# Patient Record
Sex: FEMALE | Race: WHITE | NOT HISPANIC OR LATINO | Employment: OTHER | URBAN - METROPOLITAN AREA
[De-identification: names, ages, dates, MRNs, and addresses within clinical notes are randomized per-mention and may not be internally consistent; named-entity substitution may affect disease eponyms.]

---

## 2021-05-26 ENCOUNTER — RECORDS - HEALTHEAST (OUTPATIENT)
Dept: ADMINISTRATIVE | Facility: CLINIC | Age: 81
End: 2021-05-26

## 2021-05-28 ENCOUNTER — RECORDS - HEALTHEAST (OUTPATIENT)
Dept: ADMINISTRATIVE | Facility: CLINIC | Age: 81
End: 2021-05-28

## 2021-06-16 PROBLEM — E11.9 DMII (DIABETES MELLITUS, TYPE 2) (H): Status: ACTIVE | Noted: 2019-09-21

## 2021-06-16 PROBLEM — E87.5 HYPERKALEMIA: Status: ACTIVE | Noted: 2019-09-20

## 2021-06-16 PROBLEM — I48.91 RAPID ATRIAL FIBRILLATION (H): Status: ACTIVE | Noted: 2019-09-21

## 2021-06-16 PROBLEM — N17.9 ACUTE RENAL FAILURE (H): Status: ACTIVE | Noted: 2019-09-21

## 2021-06-16 PROBLEM — E78.5 HYPERLIPIDEMIA: Status: ACTIVE | Noted: 2019-09-21

## 2021-06-16 PROBLEM — N17.0 ATN (ACUTE TUBULAR NECROSIS) (H): Status: ACTIVE | Noted: 2019-09-21

## 2021-06-16 PROBLEM — I10 HTN (HYPERTENSION): Status: ACTIVE | Noted: 2019-09-21

## 2021-06-27 ENCOUNTER — HEALTH MAINTENANCE LETTER (OUTPATIENT)
Age: 81
End: 2021-06-27

## 2021-07-21 ENCOUNTER — RECORDS - HEALTHEAST (OUTPATIENT)
Dept: ADMINISTRATIVE | Facility: CLINIC | Age: 81
End: 2021-07-21

## 2021-10-17 ENCOUNTER — HEALTH MAINTENANCE LETTER (OUTPATIENT)
Age: 81
End: 2021-10-17

## 2022-02-05 ENCOUNTER — HEALTH MAINTENANCE LETTER (OUTPATIENT)
Age: 82
End: 2022-02-05

## 2022-04-18 ENCOUNTER — HOSPITAL ENCOUNTER (EMERGENCY)
Facility: CLINIC | Age: 82
Discharge: HOME | End: 2022-04-18
Admitting: PHYSICIAN ASSISTANT
Payer: COMMERCIAL

## 2022-04-18 ENCOUNTER — APPOINTMENT (OUTPATIENT)
Dept: RADIOLOGY | Facility: CLINIC | Age: 82
End: 2022-04-18
Payer: COMMERCIAL

## 2022-04-18 VITALS
TEMPERATURE: 98.4 F | OXYGEN SATURATION: 94 % | HEIGHT: 66 IN | DIASTOLIC BLOOD PRESSURE: 84 MMHG | SYSTOLIC BLOOD PRESSURE: 181 MMHG | RESPIRATION RATE: 26 BRPM | WEIGHT: 160 LBS | HEART RATE: 79 BPM | BODY MASS INDEX: 25.71 KG/M2

## 2022-04-18 DIAGNOSIS — I10 HTN (HYPERTENSION): ICD-10-CM

## 2022-04-18 LAB
ALBUMIN SERPL-MCNC: 4.3 G/DL (ref 3.5–5)
ALP SERPL-CCNC: 67 U/L (ref 45–120)
ALT SERPL W P-5'-P-CCNC: 29 U/L (ref 0–45)
ANION GAP SERPL CALCULATED.3IONS-SCNC: 12 MMOL/L (ref 5–18)
AST SERPL W P-5'-P-CCNC: 27 U/L (ref 0–40)
BASOPHILS # BLD AUTO: 0 10E3/UL (ref 0–0.2)
BASOPHILS NFR BLD AUTO: 1 %
BILIRUB SERPL-MCNC: 0.3 MG/DL (ref 0–1)
BUN SERPL-MCNC: 43 MG/DL (ref 8–28)
CALCIUM SERPL-MCNC: 10.3 MG/DL (ref 8.5–10.5)
CHLORIDE BLD-SCNC: 107 MMOL/L (ref 98–107)
CO2 SERPL-SCNC: 22 MMOL/L (ref 22–31)
CREAT SERPL-MCNC: 1.46 MG/DL (ref 0.6–1.1)
EOSINOPHIL # BLD AUTO: 0.2 10E3/UL (ref 0–0.7)
EOSINOPHIL NFR BLD AUTO: 2 %
ERYTHROCYTE [DISTWIDTH] IN BLOOD BY AUTOMATED COUNT: 12.7 % (ref 10–15)
GFR SERPL CREATININE-BSD FRML MDRD: 36 ML/MIN/1.73M2
GLUCOSE BLD-MCNC: 142 MG/DL (ref 70–125)
HCT VFR BLD AUTO: 39.9 % (ref 35–47)
HGB BLD-MCNC: 13.5 G/DL (ref 11.7–15.7)
IMM GRANULOCYTES # BLD: 0 10E3/UL
IMM GRANULOCYTES NFR BLD: 1 %
INR PPP: 3.22 (ref 0.85–1.15)
LYMPHOCYTES # BLD AUTO: 1.7 10E3/UL (ref 0.8–5.3)
LYMPHOCYTES NFR BLD AUTO: 19 %
MCH RBC QN AUTO: 31.8 PG (ref 26.5–33)
MCHC RBC AUTO-ENTMCNC: 33.8 G/DL (ref 31.5–36.5)
MCV RBC AUTO: 94 FL (ref 78–100)
MONOCYTES # BLD AUTO: 0.9 10E3/UL (ref 0–1.3)
MONOCYTES NFR BLD AUTO: 10 %
NEUTROPHILS # BLD AUTO: 6.1 10E3/UL (ref 1.6–8.3)
NEUTROPHILS NFR BLD AUTO: 67 %
NRBC # BLD AUTO: 0 10E3/UL
NRBC BLD AUTO-RTO: 0 /100
PLATELET # BLD AUTO: 204 10E3/UL (ref 150–450)
POTASSIUM BLD-SCNC: 4.7 MMOL/L (ref 3.5–5)
PROT SERPL-MCNC: 7.2 G/DL (ref 6–8)
RBC # BLD AUTO: 4.24 10E6/UL (ref 3.8–5.2)
SODIUM SERPL-SCNC: 141 MMOL/L (ref 136–145)
TROPONIN I SERPL-MCNC: <0.01 NG/ML (ref 0–0.29)
TSH SERPL DL<=0.005 MIU/L-ACNC: 2.22 UIU/ML (ref 0.3–5)
WBC # BLD AUTO: 8.8 10E3/UL (ref 4–11)

## 2022-04-18 PROCEDURE — 36415 COLL VENOUS BLD VENIPUNCTURE: CPT | Performed by: PHYSICIAN ASSISTANT

## 2022-04-18 PROCEDURE — 84484 ASSAY OF TROPONIN QUANT: CPT | Performed by: PHYSICIAN ASSISTANT

## 2022-04-18 PROCEDURE — 80053 COMPREHEN METABOLIC PANEL: CPT | Performed by: PHYSICIAN ASSISTANT

## 2022-04-18 PROCEDURE — 99285 EMERGENCY DEPT VISIT HI MDM: CPT | Mod: 25

## 2022-04-18 PROCEDURE — 71046 X-RAY EXAM CHEST 2 VIEWS: CPT

## 2022-04-18 PROCEDURE — 85610 PROTHROMBIN TIME: CPT | Performed by: PHYSICIAN ASSISTANT

## 2022-04-18 PROCEDURE — 84443 ASSAY THYROID STIM HORMONE: CPT | Performed by: PHYSICIAN ASSISTANT

## 2022-04-18 PROCEDURE — 93005 ELECTROCARDIOGRAM TRACING: CPT | Performed by: PHYSICIAN ASSISTANT

## 2022-04-18 PROCEDURE — 85025 COMPLETE CBC W/AUTO DIFF WBC: CPT | Performed by: PHYSICIAN ASSISTANT

## 2022-04-18 ASSESSMENT — ENCOUNTER SYMPTOMS
MUSCULOSKELETAL NEGATIVE: 1
FEVER: 0
CHEST TIGHTNESS: 0
VOMITING: 0
PHOTOPHOBIA: 0
NEUROLOGICAL NEGATIVE: 1
ENDOCRINE NEGATIVE: 1
NAUSEA: 1
CHILLS: 0
HEMATOLOGIC/LYMPHATIC NEGATIVE: 1
EYE PAIN: 0
SHORTNESS OF BREATH: 0
ABDOMINAL PAIN: 0
FATIGUE: 1
DIARRHEA: 0

## 2022-04-18 NOTE — ED TRIAGE NOTES
Patient presents to the ED with complaints of hypertension for the last week and an episode of blurred vision that has resolved.

## 2022-04-18 NOTE — DISCHARGE INSTRUCTIONS
Please continue to take your blood pressure medications as written.  Return to the ER if you have new or worsening symptoms.  Otherwise follow-up with your primary care for medication adjustments with regards to your long-term hypertension

## 2022-04-18 NOTE — ED PROVIDER NOTES
EMERGENCY DEPARTMENT ENCOUNTER      NAME: Francy Sherman  AGE: 82 year old female  YOB: 1940  MRN: [id number]  EVALUATION DATE & TIME: 4/18/2022  3:12 PM    PCP: Samy Suazo    ED PROVIDER: Manuel Louise PA-C      Chief Complaint   Patient presents with     Hypertension     Eye Problem         FINAL IMPRESSION:  1. HTN (hypertension)          MEDICAL DECISION MAKING:    Pertinent Labs & Imaging studies reviewed. (See chart for details)  82 year old female presents to the Emergency Department for evaluation of episodes of lightheadedness, fatigue, intermittent blurred vision that have been present over the last 48 to 72 hours.  Patient called her clinic today to report the symptoms as well as elevated blood pressure readings and she was referred here for a evaluation.    Overall the patient currently is only reporting fatigue.  Specifically there is no reports of blurred vision, headache, chest pain, shortness of breath, or abdominal pain.  She denies running out of any of her medications.    At this point time plan to screen with labs, EKG, chest x-ray and observe for change in clinical condition.    Work-up is essentially unremarkable here in the ED.  Discussing all of the results with the patient she reports that she currently has no symptoms whatsoever.  Blood pressure readings have jumped around from 150s to the 180s.  I do not feel that further emergent work-up is necessary.  I recommended that the patient keep the blood pressure diary over the next week or so so that she can follow-up through the primary care so they can make some long-term dosing adjustments as indicated based on chronic numbers.  I instructed the patient to return to the ED if she has new or worsening symptoms.  Overall she is agreeable with plan of care.      ED COURSE    I met with the patient, obtained history, performed an initial exam, and discussed options and plan for diagnostics and treatment here in the  ED.    At the conclusion of the encounter I discussed the results of all of the tests and the disposition. The questions were answered. The patient or family acknowledged understanding and was agreeable with the care plan.     MEDICATIONS GIVEN IN THE EMERGENCY:  Medications - No data to display    NEW PRESCRIPTIONS STARTED AT TODAY'S ER VISIT  New Prescriptions    No medications on file            =================================================================    HPI    Patient information was obtained from:   Francy Sherman is a 82 year old female with a pertinent history of hypertension, hyperlipidemia, type 2 diabetes, paroxysmal atrial fibrillation who presents to this ED for evaluation of 48 to 72 hours of reports of lightheadedness, fatigue, nausea, blurred vision.  She also reports noticing that her blood pressures have been elevated over the last 3 to 4 days as well.  There is no documented report of change in any of her long-term medications.  It appears that she is on metoprolol and amlodipine for blood pressure management.  Patient also uses warfarin.  She denies any recent head injury or trauma.  Currently she denies any pain, specifically there is no headache, chest pain, back pain, abdominal pain.  Currently her vision is back to normal.  Her only current symptom is reports of fatigue.  No other complaints at this time.      REVIEW OF SYSTEMS   Review of Systems   Constitutional: Positive for fatigue. Negative for chills and fever.   HENT: Negative.    Eyes: Positive for visual disturbance. Negative for photophobia and pain.   Respiratory: Negative for chest tightness and shortness of breath.    Cardiovascular: Negative for chest pain and leg swelling.   Gastrointestinal: Positive for nausea. Negative for abdominal pain, diarrhea and vomiting.   Endocrine: Negative.    Genitourinary: Negative.    Musculoskeletal: Negative.    Neurological: Negative.    Hematological: Negative.           PAST MEDICAL  HISTORY:  No past medical history on file.    PAST SURGICAL HISTORY:  Past Surgical History:   Procedure Laterality Date     CHOLECYSTECTOMY           CURRENT MEDICATIONS:    No current facility-administered medications for this encounter.    Current Outpatient Medications:      amLODIPine (NORVASC) 5 MG tablet, [AMLODIPINE (NORVASC) 5 MG TABLET] Take 5 mg by mouth daily., Disp: , Rfl:      aspirin 81 MG EC tablet, [ASPIRIN 81 MG EC TABLET] Take 81 mg by mouth daily., Disp: , Rfl:      erythromycin ophthalmic ointment, [ERYTHROMYCIN OPHTHALMIC OINTMENT] Administer 1 application to both eyes at bedtime., Disp: , Rfl:      glipiZIDE (GLUCOTROL) 5 MG tablet, [GLIPIZIDE (GLUCOTROL) 5 MG TABLET] Take 0.5 tablets (2.5 mg total) by mouth daily., Disp: , Rfl: 0     LANTUS SOLOSTAR U-100 INSULIN 100 unit/mL (3 mL) pen, [LANTUS SOLOSTAR U-100 INSULIN 100 UNIT/ML (3 ML) PEN] Inject 10 Units under the skin at bedtime. 11.65 Type 2 with hyperglycemia  Contact provider if insulin prescribed is not the preferred insulin per insurance., Disp: 5 pen, Rfl: PRN     metoprolol succinate (TOPROL-XL) 25 MG, [METOPROLOL SUCCINATE (TOPROL-XL) 25 MG] Take 1 tablet (25 mg total) by mouth daily., Disp: 30 tablet, Rfl: 0     pravastatin (PRAVACHOL) 10 MG tablet, [PRAVASTATIN (PRAVACHOL) 10 MG TABLET] Take 1 tablet (10 mg total) by mouth daily with supper., Disp: 30 tablet, Rfl: 0     SITagliptin (JANUVIA) 25 MG tablet, [SITAGLIPTIN (JANUVIA) 25 MG TABLET] Take 1 tablet (25 mg total) by mouth daily., Disp: 30 tablet, Rfl: 0     warfarin (COUMADIN/JANTOVEN) 5 MG tablet, [WARFARIN (COUMADIN/JANTOVEN) 5 MG TABLET] Take 1 tablet (5 mg total) by mouth daily. X 3 days then dose to be adjusted by PCP  based on INR goal of 2-3, Disp: 10 tablet, Rfl: 0      ALLERGIES:  Allergies   Allergen Reactions     Macrobid [Nitrofurantoin] Rash       FAMILY HISTORY:  No family history on file.    SOCIAL HISTORY:   Social History     Socioeconomic History      "Marital status:    Tobacco Use     Smoking status: Former Smoker     Packs/day: 0.00     Smokeless tobacco: Never Used   Substance and Sexual Activity     Alcohol use: Never     Drug use: Never       VITALS:  Patient Vitals for the past 24 hrs:   BP Temp Temp src Pulse Resp SpO2 Height Weight   04/18/22 1645 (!) 181/84 -- -- 79 26 94 % -- --   04/18/22 1615 (!) 160/75 -- -- 78 (!) 38 96 % -- --   04/18/22 1600 (!) 157/74 -- -- 77 26 95 % -- --   04/18/22 1545 (!) 167/72 -- -- 75 30 97 % -- --   04/18/22 1442 (!) 202/100 98.4  F (36.9  C) Oral 83 18 98 % 1.676 m (5' 6\") 72.6 kg (160 lb)       PHYSICAL EXAM    Physical Exam  Vitals and nursing note reviewed.   Constitutional:       General: She is not in acute distress.     Appearance: She is normal weight. She is not toxic-appearing.   HENT:      Head: Normocephalic and atraumatic.      Right Ear: External ear normal.      Left Ear: External ear normal.      Mouth/Throat:      Mouth: Mucous membranes are moist.   Eyes:      Conjunctiva/sclera: Conjunctivae normal.      Pupils: Pupils are equal, round, and reactive to light.   Cardiovascular:      Rate and Rhythm: Normal rate.      Pulses: Normal pulses.   Pulmonary:      Effort: Pulmonary effort is normal. No respiratory distress.      Breath sounds: No wheezing or rales.   Abdominal:      General: Abdomen is flat. Bowel sounds are normal.      Tenderness: There is no guarding or rebound.   Musculoskeletal:         General: No deformity or signs of injury. Normal range of motion.      Cervical back: Normal range of motion.      Right lower leg: No edema.      Left lower leg: No edema.   Skin:     General: Skin is warm and dry.      Findings: No rash.   Neurological:      General: No focal deficit present.      Mental Status: She is alert. Mental status is at baseline.      Sensory: No sensory deficit.      Motor: No weakness.   Psychiatric:         Mood and Affect: Mood normal.          LAB:  All pertinent " labs reviewed and interpreted.  Results for orders placed or performed during the hospital encounter of 04/18/22   Chest XR,  PA & LAT    Impression    IMPRESSION: Mild bandlike atelectasis within the lingula. No evidence for CHF or pneumonia. No pleural effusion or pneumothorax. Normal heart size.   Result Value Ref Range    Troponin I <0.01 0.00 - 0.29 ng/mL   Comprehensive metabolic panel   Result Value Ref Range    Sodium 141 136 - 145 mmol/L    Potassium 4.7 3.5 - 5.0 mmol/L    Chloride 107 98 - 107 mmol/L    Carbon Dioxide (CO2) 22 22 - 31 mmol/L    Anion Gap 12 5 - 18 mmol/L    Urea Nitrogen 43 (H) 8 - 28 mg/dL    Creatinine 1.46 (H) 0.60 - 1.10 mg/dL    Calcium 10.3 8.5 - 10.5 mg/dL    Glucose 142 (H) 70 - 125 mg/dL    Alkaline Phosphatase 67 45 - 120 U/L    AST 27 0 - 40 U/L    ALT 29 0 - 45 U/L    Protein Total 7.2 6.0 - 8.0 g/dL    Albumin 4.3 3.5 - 5.0 g/dL    Bilirubin Total 0.3 0.0 - 1.0 mg/dL    GFR Estimate 36 (L) >60 mL/min/1.73m2   TSH with free T4 reflex   Result Value Ref Range    TSH 2.22 0.30 - 5.00 uIU/mL   Result Value Ref Range    INR 3.22 (H) 0.85 - 1.15   CBC with platelets and differential   Result Value Ref Range    WBC Count 8.8 4.0 - 11.0 10e3/uL    RBC Count 4.24 3.80 - 5.20 10e6/uL    Hemoglobin 13.5 11.7 - 15.7 g/dL    Hematocrit 39.9 35.0 - 47.0 %    MCV 94 78 - 100 fL    MCH 31.8 26.5 - 33.0 pg    MCHC 33.8 31.5 - 36.5 g/dL    RDW 12.7 10.0 - 15.0 %    Platelet Count 204 150 - 450 10e3/uL    % Neutrophils 67 %    % Lymphocytes 19 %    % Monocytes 10 %    % Eosinophils 2 %    % Basophils 1 %    % Immature Granulocytes 1 %    NRBCs per 100 WBC 0 <1 /100    Absolute Neutrophils 6.1 1.6 - 8.3 10e3/uL    Absolute Lymphocytes 1.7 0.8 - 5.3 10e3/uL    Absolute Monocytes 0.9 0.0 - 1.3 10e3/uL    Absolute Eosinophils 0.2 0.0 - 0.7 10e3/uL    Absolute Basophils 0.0 0.0 - 0.2 10e3/uL    Absolute Immature Granulocytes 0.0 <=0.4 10e3/uL    Absolute NRBCs 0.0 10e3/uL       RADIOLOGY:  Reviewed  all pertinent imaging. Please see official radiology report.  Chest XR,  PA & LAT   Final Result   IMPRESSION: Mild bandlike atelectasis within the lingula. No evidence for CHF or pneumonia. No pleural effusion or pneumothorax. Normal heart size.          EKG:    Performed at: 1531    The EKG showing a sinus rhythm at a rate of 73 bpm, persistent evidence of right bundle branch block which is shown on previous EKG.  QTC measured at 45.  ST segments are grossly normal.  T waves do show intermittent inversion but unchanged compared to previous EKG.    I have independently reviewed and interpreted the EKG(s) documented above.          Manuel Louise PA-C  Emergency Medicine  [facility]     Manuel Louise PA-C  04/18/22 9075

## 2022-04-19 LAB
ATRIAL RATE - MUSE: 73 BPM
DIASTOLIC BLOOD PRESSURE - MUSE: 93 MMHG
INTERPRETATION ECG - MUSE: NORMAL
P AXIS - MUSE: 52 DEGREES
PR INTERVAL - MUSE: 154 MS
QRS DURATION - MUSE: 132 MS
QT - MUSE: 386 MS
QTC - MUSE: 425 MS
R AXIS - MUSE: -33 DEGREES
SYSTOLIC BLOOD PRESSURE - MUSE: 155 MMHG
T AXIS - MUSE: -6 DEGREES
VENTRICULAR RATE- MUSE: 73 BPM

## 2022-07-23 ENCOUNTER — HEALTH MAINTENANCE LETTER (OUTPATIENT)
Age: 82
End: 2022-07-23

## 2022-10-01 ENCOUNTER — HEALTH MAINTENANCE LETTER (OUTPATIENT)
Age: 82
End: 2022-10-01

## 2023-05-14 ENCOUNTER — HEALTH MAINTENANCE LETTER (OUTPATIENT)
Age: 83
End: 2023-05-14

## 2023-08-06 ENCOUNTER — HEALTH MAINTENANCE LETTER (OUTPATIENT)
Age: 83
End: 2023-08-06

## 2023-12-24 ENCOUNTER — HEALTH MAINTENANCE LETTER (OUTPATIENT)
Age: 83
End: 2023-12-24

## 2024-06-10 ENCOUNTER — TRANSFERRED RECORDS (OUTPATIENT)
Dept: HEALTH INFORMATION MANAGEMENT | Facility: CLINIC | Age: 84
End: 2024-06-10
Payer: COMMERCIAL

## 2024-06-14 ENCOUNTER — APPOINTMENT (OUTPATIENT)
Dept: RADIOLOGY | Facility: CLINIC | Age: 84
DRG: 862 | End: 2024-06-14
Attending: HOSPITALIST
Payer: COMMERCIAL

## 2024-06-14 ENCOUNTER — APPOINTMENT (OUTPATIENT)
Dept: RADIOLOGY | Facility: CLINIC | Age: 84
DRG: 862 | End: 2024-06-14
Attending: EMERGENCY MEDICINE
Payer: COMMERCIAL

## 2024-06-14 ENCOUNTER — HOSPITAL ENCOUNTER (INPATIENT)
Facility: CLINIC | Age: 84
LOS: 8 days | Discharge: ANOTHER HOSPITAL | DRG: 862 | End: 2024-06-22
Attending: EMERGENCY MEDICINE | Admitting: HOSPITALIST
Payer: COMMERCIAL

## 2024-06-14 DIAGNOSIS — M79.604 RIGHT LEG PAIN: ICD-10-CM

## 2024-06-14 DIAGNOSIS — A41.9 SEPSIS, DUE TO UNSPECIFIED ORGANISM, UNSPECIFIED WHETHER ACUTE ORGAN DYSFUNCTION PRESENT (H): ICD-10-CM

## 2024-06-14 DIAGNOSIS — T14.8XXA OPEN WOUND: Primary | ICD-10-CM

## 2024-06-14 LAB
ALBUMIN SERPL BCG-MCNC: 3.7 G/DL (ref 3.5–5.2)
ALBUMIN UR-MCNC: 30 MG/DL
ALP SERPL-CCNC: 89 U/L (ref 40–150)
ALT SERPL W P-5'-P-CCNC: 72 U/L (ref 0–50)
ANION GAP SERPL CALCULATED.3IONS-SCNC: 14 MMOL/L (ref 7–15)
APPEARANCE UR: CLEAR
APTT PPP: 64 SECONDS (ref 22–38)
AST SERPL W P-5'-P-CCNC: 59 U/L (ref 0–45)
BACTERIA #/AREA URNS HPF: ABNORMAL /HPF
BASOPHILS # BLD AUTO: 0 10E3/UL (ref 0–0.2)
BASOPHILS NFR BLD AUTO: 0 %
BILIRUB SERPL-MCNC: 0.7 MG/DL
BILIRUB UR QL STRIP: NEGATIVE
BUN SERPL-MCNC: 41.2 MG/DL (ref 8–23)
CALCIUM SERPL-MCNC: 9.4 MG/DL (ref 8.8–10.2)
CHLORIDE SERPL-SCNC: 104 MMOL/L (ref 98–107)
CK SERPL-CCNC: 106 U/L (ref 26–192)
COLOR UR AUTO: ABNORMAL
CREAT SERPL-MCNC: 1.62 MG/DL (ref 0.51–0.95)
DEPRECATED HCO3 PLAS-SCNC: 20 MMOL/L (ref 22–29)
EGFRCR SERPLBLD CKD-EPI 2021: 31 ML/MIN/1.73M2
EOSINOPHIL # BLD AUTO: 0.1 10E3/UL (ref 0–0.7)
EOSINOPHIL NFR BLD AUTO: 1 %
ERYTHROCYTE [DISTWIDTH] IN BLOOD BY AUTOMATED COUNT: 12.8 % (ref 10–15)
GLUCOSE BLDC GLUCOMTR-MCNC: 170 MG/DL (ref 70–99)
GLUCOSE BLDC GLUCOMTR-MCNC: 170 MG/DL (ref 70–99)
GLUCOSE BLDC GLUCOMTR-MCNC: 180 MG/DL (ref 70–99)
GLUCOSE SERPL-MCNC: 203 MG/DL (ref 70–99)
GLUCOSE UR STRIP-MCNC: NEGATIVE MG/DL
HBA1C MFR BLD: 7 %
HCT VFR BLD AUTO: 37.1 % (ref 35–47)
HGB BLD-MCNC: 12 G/DL (ref 11.7–15.7)
HGB UR QL STRIP: NEGATIVE
IMM GRANULOCYTES # BLD: 0.1 10E3/UL
IMM GRANULOCYTES NFR BLD: 1 %
INR PPP: 4.03 (ref 0.85–1.15)
INR PPP: 4.15 (ref 0.85–1.15)
KETONES UR STRIP-MCNC: ABNORMAL MG/DL
LACTATE SERPL-SCNC: 0.9 MMOL/L (ref 0.7–2)
LEUKOCYTE ESTERASE UR QL STRIP: NEGATIVE
LYMPHOCYTES # BLD AUTO: 0.4 10E3/UL (ref 0.8–5.3)
LYMPHOCYTES NFR BLD AUTO: 2 %
MCH RBC QN AUTO: 30.9 PG (ref 26.5–33)
MCHC RBC AUTO-ENTMCNC: 32.3 G/DL (ref 31.5–36.5)
MCV RBC AUTO: 96 FL (ref 78–100)
MONOCYTES # BLD AUTO: 1.2 10E3/UL (ref 0–1.3)
MONOCYTES NFR BLD AUTO: 7 %
MUCOUS THREADS #/AREA URNS LPF: PRESENT /LPF
NEUTROPHILS # BLD AUTO: 15.2 10E3/UL (ref 1.6–8.3)
NEUTROPHILS NFR BLD AUTO: 89 %
NITRATE UR QL: NEGATIVE
NRBC # BLD AUTO: 0 10E3/UL
NRBC BLD AUTO-RTO: 0 /100
PH UR STRIP: 5 [PH] (ref 5–7)
PLATELET # BLD AUTO: 141 10E3/UL (ref 150–450)
POTASSIUM SERPL-SCNC: 4.1 MMOL/L (ref 3.4–5.3)
PROCALCITONIN SERPL IA-MCNC: 0.83 NG/ML
PROT SERPL-MCNC: 6.6 G/DL (ref 6.4–8.3)
RBC # BLD AUTO: 3.88 10E6/UL (ref 3.8–5.2)
RBC URINE: <1 /HPF
SODIUM SERPL-SCNC: 138 MMOL/L (ref 135–145)
SP GR UR STRIP: 1.02 (ref 1–1.03)
SQUAMOUS EPITHELIAL: <1 /HPF
UROBILINOGEN UR STRIP-MCNC: <2 MG/DL
WBC # BLD AUTO: 17 10E3/UL (ref 4–11)
WBC URINE: <1 /HPF

## 2024-06-14 PROCEDURE — 82040 ASSAY OF SERUM ALBUMIN: CPT | Performed by: EMERGENCY MEDICINE

## 2024-06-14 PROCEDURE — 73590 X-RAY EXAM OF LOWER LEG: CPT | Mod: RT

## 2024-06-14 PROCEDURE — 250N000013 HC RX MED GY IP 250 OP 250 PS 637: Performed by: HOSPITALIST

## 2024-06-14 PROCEDURE — 99222 1ST HOSP IP/OBS MODERATE 55: CPT | Performed by: HOSPITALIST

## 2024-06-14 PROCEDURE — 250N000013 HC RX MED GY IP 250 OP 250 PS 637: Performed by: INTERNAL MEDICINE

## 2024-06-14 PROCEDURE — 258N000003 HC RX IP 258 OP 636: Mod: JZ | Performed by: EMERGENCY MEDICINE

## 2024-06-14 PROCEDURE — 36415 COLL VENOUS BLD VENIPUNCTURE: CPT | Performed by: EMERGENCY MEDICINE

## 2024-06-14 PROCEDURE — 85730 THROMBOPLASTIN TIME PARTIAL: CPT | Performed by: EMERGENCY MEDICINE

## 2024-06-14 PROCEDURE — 85025 COMPLETE CBC W/AUTO DIFF WBC: CPT | Performed by: EMERGENCY MEDICINE

## 2024-06-14 PROCEDURE — 250N000011 HC RX IP 250 OP 636: Mod: JZ | Performed by: EMERGENCY MEDICINE

## 2024-06-14 PROCEDURE — 83036 HEMOGLOBIN GLYCOSYLATED A1C: CPT | Performed by: HOSPITALIST

## 2024-06-14 PROCEDURE — 51798 US URINE CAPACITY MEASURE: CPT

## 2024-06-14 PROCEDURE — 96368 THER/DIAG CONCURRENT INF: CPT

## 2024-06-14 PROCEDURE — 83605 ASSAY OF LACTIC ACID: CPT | Performed by: EMERGENCY MEDICINE

## 2024-06-14 PROCEDURE — 82550 ASSAY OF CK (CPK): CPT | Performed by: EMERGENCY MEDICINE

## 2024-06-14 PROCEDURE — 250N000013 HC RX MED GY IP 250 OP 250 PS 637: Performed by: EMERGENCY MEDICINE

## 2024-06-14 PROCEDURE — 96365 THER/PROPH/DIAG IV INF INIT: CPT

## 2024-06-14 PROCEDURE — 81001 URINALYSIS AUTO W/SCOPE: CPT | Performed by: HOSPITALIST

## 2024-06-14 PROCEDURE — 85610 PROTHROMBIN TIME: CPT | Performed by: HOSPITALIST

## 2024-06-14 PROCEDURE — 36415 COLL VENOUS BLD VENIPUNCTURE: CPT | Performed by: HOSPITALIST

## 2024-06-14 PROCEDURE — 96361 HYDRATE IV INFUSION ADD-ON: CPT

## 2024-06-14 PROCEDURE — 250N000012 HC RX MED GY IP 250 OP 636 PS 637: Performed by: HOSPITALIST

## 2024-06-14 PROCEDURE — 84145 PROCALCITONIN (PCT): CPT | Performed by: EMERGENCY MEDICINE

## 2024-06-14 PROCEDURE — 96375 TX/PRO/DX INJ NEW DRUG ADDON: CPT

## 2024-06-14 PROCEDURE — 73610 X-RAY EXAM OF ANKLE: CPT | Mod: RT

## 2024-06-14 PROCEDURE — 85610 PROTHROMBIN TIME: CPT | Performed by: EMERGENCY MEDICINE

## 2024-06-14 PROCEDURE — 99285 EMERGENCY DEPT VISIT HI MDM: CPT | Mod: 25

## 2024-06-14 PROCEDURE — 120N000001 HC R&B MED SURG/OB

## 2024-06-14 PROCEDURE — 87040 BLOOD CULTURE FOR BACTERIA: CPT | Performed by: EMERGENCY MEDICINE

## 2024-06-14 PROCEDURE — 82962 GLUCOSE BLOOD TEST: CPT

## 2024-06-14 PROCEDURE — 96366 THER/PROPH/DIAG IV INF ADDON: CPT

## 2024-06-14 PROCEDURE — 51701 INSERT BLADDER CATHETER: CPT

## 2024-06-14 PROCEDURE — 73620 X-RAY EXAM OF FOOT: CPT | Mod: RT

## 2024-06-14 RX ORDER — NALOXONE HYDROCHLORIDE 0.4 MG/ML
0.4 INJECTION, SOLUTION INTRAMUSCULAR; INTRAVENOUS; SUBCUTANEOUS
Status: DISCONTINUED | OUTPATIENT
Start: 2024-06-14 | End: 2024-06-22 | Stop reason: HOSPADM

## 2024-06-14 RX ORDER — PIPERACILLIN SODIUM, TAZOBACTAM SODIUM 3; .375 G/15ML; G/15ML
3.38 INJECTION, POWDER, LYOPHILIZED, FOR SOLUTION INTRAVENOUS ONCE
Status: DISCONTINUED | OUTPATIENT
Start: 2024-06-14 | End: 2024-06-14

## 2024-06-14 RX ORDER — NICOTINE POLACRILEX 4 MG
15-30 LOZENGE BUCCAL
Status: DISCONTINUED | OUTPATIENT
Start: 2024-06-14 | End: 2024-06-22 | Stop reason: HOSPADM

## 2024-06-14 RX ORDER — LIDOCAINE 40 MG/G
CREAM TOPICAL
Status: DISCONTINUED | OUTPATIENT
Start: 2024-06-14 | End: 2024-06-22 | Stop reason: HOSPADM

## 2024-06-14 RX ORDER — METOPROLOL SUCCINATE 25 MG/1
25 TABLET, EXTENDED RELEASE ORAL DAILY
Status: DISCONTINUED | OUTPATIENT
Start: 2024-06-14 | End: 2024-06-22 | Stop reason: HOSPADM

## 2024-06-14 RX ORDER — CEPHALEXIN 500 MG/1
500 CAPSULE ORAL 2 TIMES DAILY
Status: ON HOLD | COMMUNITY
End: 2024-07-02

## 2024-06-14 RX ORDER — OXYCODONE HYDROCHLORIDE 5 MG/1
5 TABLET ORAL EVERY 4 HOURS PRN
Status: DISCONTINUED | OUTPATIENT
Start: 2024-06-14 | End: 2024-06-22 | Stop reason: HOSPADM

## 2024-06-14 RX ORDER — AMOXICILLIN 250 MG
2 CAPSULE ORAL 2 TIMES DAILY PRN
Status: DISCONTINUED | OUTPATIENT
Start: 2024-06-14 | End: 2024-06-22 | Stop reason: HOSPADM

## 2024-06-14 RX ORDER — ONDANSETRON 2 MG/ML
4 INJECTION INTRAMUSCULAR; INTRAVENOUS EVERY 6 HOURS PRN
Status: DISCONTINUED | OUTPATIENT
Start: 2024-06-14 | End: 2024-06-22

## 2024-06-14 RX ORDER — GLIPIZIDE 5 MG/1
5 TABLET ORAL
COMMUNITY

## 2024-06-14 RX ORDER — CEFEPIME HYDROCHLORIDE 2 G/1
2 INJECTION, POWDER, FOR SOLUTION INTRAVENOUS ONCE
Status: COMPLETED | OUTPATIENT
Start: 2024-06-14 | End: 2024-06-14

## 2024-06-14 RX ORDER — PRAVASTATIN SODIUM 40 MG
40 TABLET ORAL AT BEDTIME
COMMUNITY

## 2024-06-14 RX ORDER — DEXTROSE MONOHYDRATE 25 G/50ML
25-50 INJECTION, SOLUTION INTRAVENOUS
Status: DISCONTINUED | OUTPATIENT
Start: 2024-06-14 | End: 2024-06-22 | Stop reason: HOSPADM

## 2024-06-14 RX ORDER — AMLODIPINE BESYLATE 5 MG/1
5 TABLET ORAL DAILY
Status: DISCONTINUED | OUTPATIENT
Start: 2024-06-15 | End: 2024-06-22 | Stop reason: HOSPADM

## 2024-06-14 RX ORDER — AMOXICILLIN 250 MG
1 CAPSULE ORAL 2 TIMES DAILY PRN
Status: DISCONTINUED | OUTPATIENT
Start: 2024-06-14 | End: 2024-06-22 | Stop reason: HOSPADM

## 2024-06-14 RX ORDER — VANCOMYCIN HYDROCHLORIDE 1 G/200ML
1000 INJECTION, SOLUTION INTRAVENOUS ONCE
Status: COMPLETED | OUTPATIENT
Start: 2024-06-14 | End: 2024-06-14

## 2024-06-14 RX ORDER — NALOXONE HYDROCHLORIDE 0.4 MG/ML
0.2 INJECTION, SOLUTION INTRAMUSCULAR; INTRAVENOUS; SUBCUTANEOUS
Status: DISCONTINUED | OUTPATIENT
Start: 2024-06-14 | End: 2024-06-22 | Stop reason: HOSPADM

## 2024-06-14 RX ORDER — ACETAMINOPHEN 325 MG/1
650 TABLET ORAL EVERY 6 HOURS PRN
Status: DISCONTINUED | OUTPATIENT
Start: 2024-06-14 | End: 2024-06-22 | Stop reason: HOSPADM

## 2024-06-14 RX ORDER — ACETAMINOPHEN 325 MG/1
650 TABLET ORAL EVERY MORNING
COMMUNITY

## 2024-06-14 RX ORDER — ACETAMINOPHEN 325 MG/1
975 TABLET ORAL ONCE
Status: COMPLETED | OUTPATIENT
Start: 2024-06-14 | End: 2024-06-14

## 2024-06-14 RX ORDER — HYDROMORPHONE HYDROCHLORIDE 1 MG/ML
0.5 INJECTION, SOLUTION INTRAMUSCULAR; INTRAVENOUS; SUBCUTANEOUS ONCE
Status: COMPLETED | OUTPATIENT
Start: 2024-06-14 | End: 2024-06-14

## 2024-06-14 RX ORDER — HYDROMORPHONE HCL IN WATER/PF 6 MG/30 ML
0.2 PATIENT CONTROLLED ANALGESIA SYRINGE INTRAVENOUS
Status: DISCONTINUED | OUTPATIENT
Start: 2024-06-14 | End: 2024-06-22 | Stop reason: HOSPADM

## 2024-06-14 RX ORDER — PRAVASTATIN SODIUM 20 MG
40 TABLET ORAL AT BEDTIME
Status: DISCONTINUED | OUTPATIENT
Start: 2024-06-14 | End: 2024-06-22 | Stop reason: HOSPADM

## 2024-06-14 RX ADMIN — CEFEPIME 2 G: 2 INJECTION, POWDER, FOR SOLUTION INTRAVENOUS at 08:04

## 2024-06-14 RX ADMIN — METOPROLOL SUCCINATE 25 MG: 25 TABLET, EXTENDED RELEASE ORAL at 13:35

## 2024-06-14 RX ADMIN — INSULIN ASPART 1 UNITS: 100 INJECTION, SOLUTION INTRAVENOUS; SUBCUTANEOUS at 13:35

## 2024-06-14 RX ADMIN — OXYCODONE HYDROCHLORIDE 5 MG: 5 TABLET ORAL at 19:03

## 2024-06-14 RX ADMIN — VANCOMYCIN HYDROCHLORIDE 1000 MG: 1 INJECTION, SOLUTION INTRAVENOUS at 08:00

## 2024-06-14 RX ADMIN — INSULIN GLARGINE 10 UNITS: 100 INJECTION, SOLUTION SUBCUTANEOUS at 21:24

## 2024-06-14 RX ADMIN — PRAVASTATIN SODIUM 40 MG: 20 TABLET ORAL at 21:24

## 2024-06-14 RX ADMIN — HYDROMORPHONE HYDROCHLORIDE 0.5 MG: 1 INJECTION, SOLUTION INTRAMUSCULAR; INTRAVENOUS; SUBCUTANEOUS at 07:59

## 2024-06-14 RX ADMIN — INSULIN ASPART 1 UNITS: 100 INJECTION, SOLUTION INTRAVENOUS; SUBCUTANEOUS at 17:03

## 2024-06-14 RX ADMIN — SODIUM CHLORIDE 1000 ML: 9 INJECTION, SOLUTION INTRAVENOUS at 07:56

## 2024-06-14 RX ADMIN — ACETAMINOPHEN 975 MG: 325 TABLET ORAL at 08:10

## 2024-06-14 RX ADMIN — ACETAMINOPHEN 650 MG: 325 TABLET ORAL at 18:34

## 2024-06-14 ASSESSMENT — ACTIVITIES OF DAILY LIVING (ADL)
ADLS_ACUITY_SCORE: 28
ADLS_ACUITY_SCORE: 35
ADLS_ACUITY_SCORE: 28
ADLS_ACUITY_SCORE: 28
ADLS_ACUITY_SCORE: 35
ADLS_ACUITY_SCORE: 28
ADLS_ACUITY_SCORE: 28
DEPENDENT_IADLS:: INDEPENDENT
ADLS_ACUITY_SCORE: 28
ADLS_ACUITY_SCORE: 35
ADLS_ACUITY_SCORE: 35
ADLS_ACUITY_SCORE: 28
ADLS_ACUITY_SCORE: 35
ADLS_ACUITY_SCORE: 35

## 2024-06-14 ASSESSMENT — COLUMBIA-SUICIDE SEVERITY RATING SCALE - C-SSRS
2. HAVE YOU ACTUALLY HAD ANY THOUGHTS OF KILLING YOURSELF IN THE PAST MONTH?: NO
6. HAVE YOU EVER DONE ANYTHING, STARTED TO DO ANYTHING, OR PREPARED TO DO ANYTHING TO END YOUR LIFE?: NO
1. IN THE PAST MONTH, HAVE YOU WISHED YOU WERE DEAD OR WISHED YOU COULD GO TO SLEEP AND NOT WAKE UP?: NO

## 2024-06-14 NOTE — ED NOTES
Pt unable to void with purewick. Pt reports felling nausea from pressure on bladder. Bladder scan done. Result 780mL. Doing straight cath for urine and collecting UA. Pt reports unable to void the past few days.

## 2024-06-14 NOTE — PHARMACY-ANTICOAGULATION SERVICE
Clinical Pharmacy - Warfarin Dosing Consult     Pharmacy has been consulted to manage this patient s warfarin therapy.  Indication: Atrial Fibrillation  Therapy Goal: INR 2-3  Provider/Team: ama  Warfarin Prior to Admission: Yes  Warfarin PTA Regimen: 7.5mwf 5mg all other  Significant drug interactions: cephalexin 6/13  Dose Comments: none on 6/14    INR   Date Value Ref Range Status   06/14/2024 4.15 (H) 0.85 - 1.15 Final   04/18/2022 3.22 (H) 0.85 - 1.15 Final       Recommend warfarin 0 mg today.  Pharmacy will monitor Francy AMINATA Sherman daily and order warfarin doses to achieve specified goal.      Please contact pharmacy as soon as possible if the warfarin needs to be held for a procedure or if the warfarin goals change.    0

## 2024-06-14 NOTE — H&P
"[facility]    History and Physical - Hospitalist Service       Date of Admission:  6/14/2024    Assessment & Plan      Francy Sherman is a 84 year old female presenting with RLE pain in the setting of recent cancer excision.  She is hemodynamically stable.  Exam notable for cellulitis associated with the excision site.  There is notable lower extremity edema in the RLE.  Lower concern for DVT given she is therapeutically anticoagulated.  Limited ROM due to pain at the right ankle may be due to swelling alone.  However, further evaluation with XR.  Hesitant to pursue arthrocentesis given possibility for seeding infection.  Depending on clinical course may need to pursue further evaluation with orthopaedics and consider arthrocentesis.      # Skin/soft tissue infection  - empiric vancomycin  - XR R ankle/foot    # DM  - ISS    # Afib  - hold warfarin today given supratherapeutic iNR    # CKD 3b          Diet: Consistent Carbohydrate Diet Moderate Consistent Carb (60 g CHO per Meal) Diet    [place] Catheter: Not present  Lines: None     Cardiac Monitoring: None  Code Status: No CPR- Do NOT Intubate    Clinically Significant Risk Factors Present on Admission               # Drug Induced Coagulation Defect: home medication list includes an anticoagulant medication    # Hypertension: Noted on problem list             # Overweight: Estimated body mass index is 25.46 kg/m  as calculated from the following:    Height as of this encounter: 1.651 m (5' 5\").    Weight as of this encounter: 69.4 kg (153 lb).              Disposition Plan     Medically Ready for Discharge:            Manuel Lucsa MD  Hospitalist Service  [facility]  Securely message with [employer] (more info)  Text page via [employer] Paging/Directory     ______________________________________________________________________    Chief Complaint   Leg pain    History is obtained from the patient    History of Present " "Illness   Francy Sherman is a 84 year old female who presents with a chief complaint of leg pain.    Four days ago, she had skin cancer removed from her right lower leg.  The next day, she developed pain and swelling on the top of the right foot.  This progressed to the plantar surface of the foot as well as the ankles.  She notes progression of swelling over the subsequent days to the point where she couldn't bend her toes or ankle.  She reports feeling subjective fevers.    Denies chest pain/pressure, dyspnea, or abdominal pain.      Past Medical History    No past medical history on file.    Past Surgical History   Past Surgical History:   Procedure Laterality Date    CHOLECYSTECTOMY         Prior to Admission Medications   Prior to Admission Medications   Prescriptions Last Dose Informant Patient Reported? Taking?   LANTUS SOLOSTAR U-100 INSULIN 100 unit/mL (3 mL) pen 6/13/2024  No Yes   Sig: [LANTUS SOLOSTAR U-100 INSULIN 100 UNIT/ML (3 ML) PEN] Inject 10 Units under the skin at bedtime. 11.65 Type 2 with hyperglycemia  Contact provider if insulin prescribed is not the preferred insulin per insurance.   Patient taking differently: Inject Subcutaneous at bedtime Recent available clinic records suggest 16 units at bedtime.  Patient says she takes between \"50 and 250 units daily\" asked multiple times to clarify   acetaminophen (TYLENOL) 325 MG tablet 6/13/2024  Yes Yes   Sig: Take 650 mg by mouth every morning   amLODIPine (NORVASC) 5 MG tablet 6/13/2024  Yes Yes   Sig: [AMLODIPINE (NORVASC) 5 MG TABLET] Take 5 mg by mouth daily.   cephALEXin (KEFLEX) 500 MG capsule 6/13/2024  Yes Yes   Sig: Take 500 mg by mouth 2 times daily Start 7 day course 6/13/24   glipiZIDE (GLUCOTROL) 5 MG tablet 6/13/2024  Yes Yes   Sig: Take 5 mg by mouth 2 times daily (before meals)   metoprolol succinate (TOPROL-XL) 25 MG 6/13/2024  No Yes   Sig: [METOPROLOL SUCCINATE (TOPROL-XL) 25 MG] Take 1 tablet (25 mg total) by mouth daily. "   pravastatin (PRAVACHOL) 40 MG tablet 6/13/2024  Yes Yes   Sig: Take 40 mg by mouth at bedtime   warfarin (COUMADIN/JANTOVEN) 5 MG tablet 6/13/2024  No Yes   Sig: [WARFARIN (COUMADIN/JANTOVEN) 5 MG TABLET] Take 1 tablet (5 mg total) by mouth daily. X 3 days then dose to be adjusted by PCP  based on INR goal of 2-3   Patient taking differently: Take by mouth daily As of 6/14/24  7.5mg mon, wed and Friday and 5mg rest of week      Facility-Administered Medications: None           Physical Exam   Vital Signs: Temp: 98.3  F (36.8  C) Temp src: Oral BP: 139/63 Pulse: 88   Resp: 18 SpO2: 90 %      Weight: 153 lbs 0 oz    Gen:  lying in bed in no extremis  Neuro: alert, conversant  CV:  nl rate, regular rhythm  Pulm:  no acute resp distress, ctab anteriorly  GI:  abdomen NTTP  MSK:  right anterior lower leg with [id number] area of crusting with surrounding erythema and warmth; there is a small amount of bleeding; notable edema in the right foot; tenderness to passive ROM at the ankle with tenderness reported at the dorsum of the right foot    Medical Decision Making             Data   Reviewed:    Na 138  K 4.1  BUN 41  Cr 1.6    ALT 72  AST 59    WBC 17  Hgb 12  Plts 141    INR 4.2    XR R tib/fib:  IMPRESSION: The right tibia and fibula are intact without evidence of a fracture. There is no cortical destructive change to suggest osteomyelitis. Soft tissue swelling. No soft tissue gas identified. Degenerative changes right knee. Chondrocalcinosis.     Atherosclerotic vascular calcifications.

## 2024-06-14 NOTE — PLAN OF CARE
Problem: Skin or Soft Tissue Infection  Goal: Absence of Infection Signs and Symptoms  Outcome: Not Progressing   Goal Outcome Evaluation:  Afebrile. Right shin/lower extremity with erythema and edema. Tylenol administered for pain and pending response from Hospitalist for further pain management.

## 2024-06-14 NOTE — CONSULTS
Care Management Initial Consult    General Information  Assessment completed with: Patient,    Type of CM/SW Visit: Initial Assessment    Primary Care Provider verified and updated as needed: Yes   Readmission within the last 30 days: no previous admission in last 30 days      Reason for Consult: discharge planning  Advance Care Planning: Advance Care Planning Reviewed: no concerns identified          Communication Assessment  Patient's communication style: spoken language (English or Bilingual)    Hearing Difficulty or Deaf: no   Wear Glasses or Blind: yes    Cognitive  Cognitive/Neuro/Behavioral: WDL                      Living Environment:   People in home: spouse     Current living Arrangements: house      Able to return to prior arrangements: yes  Living Arrangement Comments: Lives with     Family/Social Support:  Care provided by: self  Provides care for: spouse  Marital Status:   , Children  Dae       Description of Support System: Supportive, Involved    Support Assessment: Adequate family and caregiver support    Current Resources:   Patient receiving home care services: No     Community Resources: None  Equipment currently used at home: cane, straight, glucometer, walker, standard  Supplies currently used at home: Diabetic Supplies, Wound Care Supplies    Employment/Financial:  Employment Status: retired        Financial Concerns: none   Referral to Financial Worker: No       Does the patient's insurance plan have a 3 day qualifying hospital stay waiver?  No    Lifestyle & Psychosocial Needs:  Social Determinants of Health     Food Insecurity: Not on file   Depression: Not at risk (7/26/2023)    Received from [facility]    PHQ-2     PHQ-2 Score: 0   Housing Stability: Not on file   Tobacco Use: Medium Risk (6/14/2024)    Patient History     Smoking Tobacco Use: Former     Smokeless Tobacco Use: Never     Passive Exposure: Not on file   Financial Resource Strain: Not on file    Alcohol Use: Not on file   Transportation Needs: Not on file   Physical Activity: Not on file   Interpersonal Safety: Not on file   Stress: Not on file   Social Connections: Not on file   Health Literacy: Not on file       Functional Status:  Prior to admission patient needed assistance:   Dependent ADLs:: Ambulation-cane, Ambulation-walker  Dependent IADLs:: Independent  Assesssment of Functional Status: Not at  functional baseline    Mental Health Status:          Chemical Dependency Status:              Values/Beliefs:  Spiritual, Cultural Beliefs, Mandaeism Practices, Values that affect care:                 Additional Information:   84-year-old female presenting with RLE pain in the setting of recent cancer excision.  She is hemodynamically stable.  Exam notable for cellulitis associated with the excision site.  There is notable lower extremity edema in the RLE.  Lower concern for DVT given she is therapeutically anticoagulated.  Limited ROM due to pain at the right ankle may be due to swelling alone.  However, further evaluation with XR.  Hesitant to pursue arthrocentesis given possibility for seeding infection.  Depending on clinical course may need to pursue further evaluation with orthopedics and consider arthrocentesis .     Reports she lives in a house with  Dae. At baseline states independence with I/ADLs; uses a cane and a standard walker; no home care services; drives only in [place]. 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 Has sons who live locally and will transport patient because  no longer drives. Would consider home services if indicated. Plan is to return home on discharge.    CARLOS DOMINGUEZ, RN/CM

## 2024-06-14 NOTE — ED TRIAGE NOTES
Patient presents via EMS from home. Reports having skin cancer removed from right shin on Monday. Pain to right shin has been increasing since. Redness noted to RLE began on Tuesday. Dermatology prescribed antibiotics which she started on Thursday, has had 2 doses. This morning, patient was unable to bear weight due to pain and fell in living room onto hardwood floor. Patient does not report hitting head, denies LOC. Is on coumadin. EMS gave 50 mcg fentanyl with some relief.      Triage Assessment (Adult)       Row Name 06/14/24 0649          Triage Assessment    Airway WDL WDL        Respiratory WDL    Respiratory WDL WDL        Skin Circulation/Temperature WDL    Skin Circulation/Temperature WDL WDL        Cardiac WDL    Cardiac WDL WDL        Peripheral/Neurovascular WDL    Peripheral Neurovascular WDL WDL        Cognitive/Neuro/Behavioral WDL    Cognitive/Neuro/Behavioral WDL WDL

## 2024-06-14 NOTE — ED NOTES
Introduced self to pt. Pt has redness on the left lower leg 6/10 for pain. A border has been drawn around the site a this time.

## 2024-06-14 NOTE — PHARMACY-ADMISSION MEDICATION HISTORY
"Pharmacist Admission Medication History    Admission medication history is complete. The information provided in this note is only as accurate as the sources available at the time of the update.    Information Source(s): Patient via in-person    Pertinent Information:   Patient somewhat confused about specific doses of Lantus.  Recent available records indicate 16 units at bedtime.  Patient repeatedly reports taking \"50 to 250 units\" depending on bg readings (typically ranging 70 to 260).      Changes made to PTA medication list:  Added: None  Deleted: None  Changed: None    Allergies reviewed with patient and updates made in EHR: yes    Medication History Completed By: Abdiaziz Cannon RPH 6/14/2024 8:06 AM    PTA Med List   Medication Sig Last Dose    acetaminophen (TYLENOL) 325 MG tablet Take 650 mg by mouth every morning 6/13/2024    amLODIPine (NORVASC) 5 MG tablet [AMLODIPINE (NORVASC) 5 MG TABLET] Take 5 mg by mouth daily. 6/13/2024    cephALEXin (KEFLEX) 500 MG capsule Take 500 mg by mouth 2 times daily Start 7 day course 6/13/24 6/13/2024    glipiZIDE (GLUCOTROL) 5 MG tablet Take 5 mg by mouth 2 times daily (before meals) 6/13/2024    LANTUS SOLOSTAR U-100 INSULIN 100 unit/mL (3 mL) pen [LANTUS SOLOSTAR U-100 INSULIN 100 UNIT/ML (3 ML) PEN] Inject 10 Units under the skin at bedtime. 11.65 Type 2 with hyperglycemia  Contact provider if insulin prescribed is not the preferred insulin per insurance. (Patient taking differently: Inject Subcutaneous at bedtime Recent available clinic records suggest 16 units at bedtime.  Patient says she takes between \"50 and 250 units daily\" asked multiple times to clarify) 6/13/2024    metoprolol succinate (TOPROL-XL) 25 MG [METOPROLOL SUCCINATE (TOPROL-XL) 25 MG] Take 1 tablet (25 mg total) by mouth daily. 6/13/2024    pravastatin (PRAVACHOL) 40 MG tablet Take 40 mg by mouth at bedtime 6/13/2024    warfarin (COUMADIN/JANTOVEN) 5 MG tablet [WARFARIN (COUMADIN/JANTOVEN) 5 MG " TABLET] Take 1 tablet (5 mg total) by mouth daily. X 3 days then dose to be adjusted by PCP  based on INR goal of 2-3 (Patient taking differently: Take by mouth daily As of 6/14/24  7.5mg mon, wed and Friday and 5mg rest of week) 6/13/2024

## 2024-06-14 NOTE — ED PROVIDER NOTES
EMERGENCY DEPARTMENT ENCOUNTER      NAME: Francy Sherman  AGE: 84 year old female  YOB: 1940  MRN: [id number]  EVALUATION DATE & TIME: 2024  6:44 AM    PCP: Samy Suazo    ED PROVIDER: Filiberto Hill MD       Chief Complaint   Patient presents with    Leg Pain         FINAL IMPRESSION:  1. Sepsis, due to unspecified organism, unspecified whether acute organ dysfunction present (H)    2. Right leg pain          ED COURSE & MEDICAL DECISION MAKIN:02 AM I met with the patient, obtained history, performed an initial exam, and discussed options and plan for diagnostics and treatment here in the ED.   9:59 AM I spoke with the hospitalist, Dr. Lucas.     Medical Decision Making    History:  Supplemental history from: Documented in chart  External Record(s) reviewed: Documented in chart and Outpatient Record: Office visit. Anticoagulation    Work Up:  Chart documentation includes differential considered and any EKGs or imaging independently interpreted by provider, where specified.  In additional to work up documented, I considered the following work up: Documented in chart, if applicable.    External consultation:  Discussion of management with another provider: Hospitalist    Complicating factors:  Care impacted by chronic illness: Anticoagulated State, Chronic Kidney Disease, Diabetes, Hyperlipidemia, and Hypertension  Care affected by social determinants of health: N/A    Disposition considerations: Admit.    Pertinent Labs & Imaging studies reviewed. (See chart for details)    Francy Sherman is a 84 year old female with a pertinent history of DM II, HLD, HTN, atrial fibrillation (on warfarin), CKD 3b, DJD of knee who presents to this ED by EMS for evaluation of leg pain.  Vitally stable patient otherwise in no acute distress.  Pain with palpation of the right lower extremity with mild swelling.  Concern for superimposed worsening right lower extremity infection.  No evidence of crepitus  concerning for gas-forming organisms.  Labs remarkable for elevated white count corresponding well in the setting of infection with elevated procalcitonin.  Cultures were obtained prior to antibiotic administration.  Patient does not meet criteria for severe sepsis or septic shock.  Fluids given.  X-rays negative for any gas or osteomyelitis or fractures.  Head CT is not performed due to lack of head trauma.  INR supratherapeutic so low suspicion for DVT.  While not officially failing outpatient antibiotics given she just started Keflex last night given the severity of the pain and worsening with safety concerns at home we will admit the patient for lower extremity cellulitis.  Given recent clinical setting we did add vancomycin for coverage as well as cefepime for coverage of Pseudomonas.  Stable for admission.    ED Course as of 06/14/24 1107   Fri Jun 14, 2024 0816 XR Tibia and Fibula Right 2 Views       At the conclusion of the encounter I discussed the results of all of the tests and the disposition. The questions were answered. The patient or family acknowledged understanding and was agreeable with the care plan.     MEDICATIONS GIVEN IN THE EMERGENCY:  Medications   sodium chloride (PF) 0.9% PF flush 3 mL (has no administration in time range)   sodium chloride (PF) 0.9% PF flush 3 mL (3 mLs Intracatheter $Given 6/14/24 0745)   ondansetron (ZOFRAN) injection 4 mg (has no administration in time range)   sodium chloride 0.9% BOLUS 1,000 mL (0 mLs Intravenous Stopped 6/14/24 1026)   acetaminophen (TYLENOL) tablet 975 mg (975 mg Oral $Given 6/14/24 0810)   HYDROmorphone (PF) (DILAUDID) injection 0.5 mg (0.5 mg Intravenous $Given 6/14/24 0759)   ceFEPIme (MAXIPIME) 2 g vial to attach to  mL bag for ADULTS or 50 mL bag for PEDS (0 g Intravenous Stopped 6/14/24 0859)   vancomycin (VANCOCIN) 1,000 mg in NaCl 0.9% 200 mL intermittent infusion (1,000 mg Intravenous $Given 6/14/24 0800)       NEW PRESCRIPTIONS  STARTED AT TODAY'S ER VISIT  New Prescriptions    No medications on file         =================================================================    HPI    Patient information was obtained from: Patient     Use of : None  Language English      Francy Sherman is a 84 year old female with a pertinent history of DM II, HLD, HTN, atrial fibrillation (on warfarin), CKD 3b, DJD of knee who presents to this ED by EMS for evaluation of leg pain.     The patient reports onset of right leg pain, along with pain in her buttocks from three days ago (6/11/2024). She said her right leg became red and painful, and she has been using her left leg more for support, as she is unable to put pressure on her right leg. Patient noted that she had a subjective fever this morning. At home she lives with her , and has been moving around using a desk chair with wheels. She noted this morning while trying to get to the bathroom she fell in the living room, but denies hitting her head. However she said her head hurt for five seconds after the fall. Denies pain elsewhere in the body. Patient uses a cane to get around during normal. No medications taken for pain today.     Of note, yesterday she started taking cephalexin, as she had skin cancer removed from her right shin on 6/10/2024.       REVIEW OF SYSTEMS   PER HPI     PAST MEDICAL HISTORY:  No past medical history on file.    PAST SURGICAL HISTORY:  Past Surgical History:   Procedure Laterality Date    CHOLECYSTECTOMY             CURRENT MEDICATIONS:      Current Facility-Administered Medications:     ondansetron (ZOFRAN) injection 4 mg, 4 mg, Intravenous, Q6H PRN, Filiberto Hill MD    sodium chloride (PF) 0.9% PF flush 3 mL, 3 mL, Intracatheter, q1 min prn, Filiberto Hill MD    sodium chloride (PF) 0.9% PF flush 3 mL, 3 mL, Intracatheter, Q8H, Filiberto Hill MD, 3 mL at 06/14/24 0745    Current Outpatient Medications:     acetaminophen (TYLENOL) 325 MG tablet, Take 650 mg  "by mouth every morning, Disp: , Rfl:     amLODIPine (NORVASC) 5 MG tablet, [AMLODIPINE (NORVASC) 5 MG TABLET] Take 5 mg by mouth daily., Disp: , Rfl:     cephALEXin (KEFLEX) 500 MG capsule, Take 500 mg by mouth 2 times daily Start 7 day course 6/13/24, Disp: , Rfl:     glipiZIDE (GLUCOTROL) 5 MG tablet, Take 5 mg by mouth 2 times daily (before meals), Disp: , Rfl:     LANTUS SOLOSTAR U-100 INSULIN 100 unit/mL (3 mL) pen, [LANTUS SOLOSTAR U-100 INSULIN 100 UNIT/ML (3 ML) PEN] Inject 10 Units under the skin at bedtime. 11.65 Type 2 with hyperglycemia  Contact provider if insulin prescribed is not the preferred insulin per insurance. (Patient taking differently: Inject Subcutaneous at bedtime Recent available clinic records suggest 16 units at bedtime.  Patient says she takes between \"50 and 250 units daily\" asked multiple times to clarify), Disp: 5 pen, Rfl: PRN    metoprolol succinate (TOPROL-XL) 25 MG, [METOPROLOL SUCCINATE (TOPROL-XL) 25 MG] Take 1 tablet (25 mg total) by mouth daily., Disp: 30 tablet, Rfl: 0    pravastatin (PRAVACHOL) 40 MG tablet, Take 40 mg by mouth at bedtime, Disp: , Rfl:     warfarin (COUMADIN/JANTOVEN) 5 MG tablet, [WARFARIN (COUMADIN/JANTOVEN) 5 MG TABLET] Take 1 tablet (5 mg total) by mouth daily. X 3 days then dose to be adjusted by PCP  based on INR goal of 2-3 (Patient taking differently: Take by mouth daily As of 6/14/24  7.5mg mon, wed and Friday and 5mg rest of week), Disp: 10 tablet, Rfl: 0    ALLERGIES:  Allergies   Allergen Reactions    Macrobid [Nitrofurantoin] Rash       FAMILY HISTORY:  No family history on file.    SOCIAL HISTORY:   Social History     Socioeconomic History    Marital status:    Tobacco Use    Smoking status: Former     Types: Cigarettes    Smokeless tobacco: Never   Substance and Sexual Activity    Alcohol use: Never    Drug use: Never       VITALS:  BP (!) 173/71   Pulse 84   Temp 98.3  F (36.8  C) (Oral)   Resp 18   Ht 1.651 m (5' 5\")   Wt 69.4 " kg (153 lb)   SpO2 90%   BMI 25.46 kg/m      PHYSICAL EXAM    Physical Exam  Vitals and nursing note reviewed.   HENT:      Head: Normocephalic and atraumatic.   Cardiovascular:      Rate and Rhythm: Normal rate.   Pulmonary:      Effort: Pulmonary effort is normal.   Abdominal:      Palpations: Abdomen is soft.   Skin:     Findings: Rash (Right leg rash Some bleeding. TENDER to palpation. Good pulses) present.   Neurological:      General: No focal deficit present.      Mental Status: She is alert and oriented to person, place, and time.                LAB:  All pertinent labs reviewed and interpreted.  Results for orders placed or performed during the hospital encounter of 06/14/24   XR Tibia and Fibula Right 2 Views    Impression    IMPRESSION: The right tibia and fibula are intact without evidence of a fracture. There is no cortical destructive change to suggest osteomyelitis. Soft tissue swelling. No soft tissue gas identified. Degenerative changes right knee. Chondrocalcinosis.    Atherosclerotic vascular calcifications.   Comprehensive metabolic panel   Result Value Ref Range    Sodium 138 135 - 145 mmol/L    Potassium 4.1 3.4 - 5.3 mmol/L    Carbon Dioxide (CO2) 20 (L) 22 - 29 mmol/L    Anion Gap 14 7 - 15 mmol/L    Urea Nitrogen 41.2 (H) 8.0 - 23.0 mg/dL    Creatinine 1.62 (H) 0.51 - 0.95 mg/dL    GFR Estimate 31 (L) >60 mL/min/1.73m2    Calcium 9.4 8.8 - 10.2 mg/dL    Chloride 104 98 - 107 mmol/L    Glucose 203 (H) 70 - 99 mg/dL    Alkaline Phosphatase 89 40 - 150 U/L    AST 59 (H) 0 - 45 U/L    ALT 72 (H) 0 - 50 U/L    Protein Total 6.6 6.4 - 8.3 g/dL    Albumin 3.7 3.5 - 5.2 g/dL    Bilirubin Total 0.7 <=1.2 mg/dL   Lactic Acid Whole Blood with 1X Repeat in 2 HR when >2   Result Value Ref Range    Lactic Acid, Initial 0.9 0.7 - 2.0 mmol/L   Result Value Ref Range    INR 4.15 (H) 0.85 - 1.15   Partial thromboplastin time   Result Value Ref Range    aPTT 64 (H) 22 - 38 Seconds   Result Value Ref Range     Procalcitonin 0.83 (H) <0.50 ng/mL   Result Value Ref Range     26 - 192 U/L   CBC with platelets and differential   Result Value Ref Range    WBC Count 17.0 (H) 4.0 - 11.0 10e3/uL    RBC Count 3.88 3.80 - 5.20 10e6/uL    Hemoglobin 12.0 11.7 - 15.7 g/dL    Hematocrit 37.1 35.0 - 47.0 %    MCV 96 78 - 100 fL    MCH 30.9 26.5 - 33.0 pg    MCHC 32.3 31.5 - 36.5 g/dL    RDW 12.8 10.0 - 15.0 %    Platelet Count 141 (L) 150 - 450 10e3/uL    % Neutrophils 89 %    % Lymphocytes 2 %    % Monocytes 7 %    % Eosinophils 1 %    % Basophils 0 %    % Immature Granulocytes 1 %    NRBCs per 100 WBC 0 <1 /100    Absolute Neutrophils 15.2 (H) 1.6 - 8.3 10e3/uL    Absolute Lymphocytes 0.4 (L) 0.8 - 5.3 10e3/uL    Absolute Monocytes 1.2 0.0 - 1.3 10e3/uL    Absolute Eosinophils 0.1 0.0 - 0.7 10e3/uL    Absolute Basophils 0.0 0.0 - 0.2 10e3/uL    Absolute Immature Granulocytes 0.1 <=0.4 10e3/uL    Absolute NRBCs 0.0 10e3/uL       RADIOLOGY:  I independently interpreted the below imaging showing xray negative for lytic lesions or gas.   Please see official radiology report.  XR Tibia and Fibula Right 2 Views   Final Result   IMPRESSION: The right tibia and fibula are intact without evidence of a fracture. There is no cortical destructive change to suggest osteomyelitis. Soft tissue swelling. No soft tissue gas identified. Degenerative changes right knee. Chondrocalcinosis.      Atherosclerotic vascular calcifications.          PROCEDURES:   N/A      I, Roxanna Bowers, am serving as a scribe to document services personally performed by Dr. Hill based on my observation and the provider's statements to me. I, Filiberto Hill MD attest that Roxanna Bowers is acting in a scribe capacity, has observed my performance of the services and has documented them in accordance with my direction.    Filiberto Hill M.D.  Emergency Medicine  [facility] EMERGENCY ROOM  [address]  [place] [facility] [address]  [phone]  Dept: [phone]       Filiberto Hill MD  06/14/24 2807

## 2024-06-14 NOTE — PHARMACY-VANCOMYCIN DOSING SERVICE
New vanco vi ssti    1g once    Regimen: 750 mg IV every 24 hours.  Start time: 20:00 on 06/14/2024  Exposure target: AUC24 (range)400-600 mg/L.hr   AUC24,ss: 499 mg/L.hr  Probability of AUC24 > 400: 74 %  Ctrough,ss: 16.9 mg/L  Probability of Ctrough,ss > 20: 33 %  Probability of nephrotoxicity (Lodise DELMER 2009): 13 %

## 2024-06-15 ENCOUNTER — APPOINTMENT (OUTPATIENT)
Dept: MRI IMAGING | Facility: CLINIC | Age: 84
DRG: 862 | End: 2024-06-15
Payer: COMMERCIAL

## 2024-06-15 ENCOUNTER — APPOINTMENT (OUTPATIENT)
Dept: PHYSICAL THERAPY | Facility: CLINIC | Age: 84
DRG: 862 | End: 2024-06-15
Attending: HOSPITALIST
Payer: COMMERCIAL

## 2024-06-15 LAB
ALBUMIN SERPL BCG-MCNC: 3.4 G/DL (ref 3.5–5.2)
ALP SERPL-CCNC: 120 U/L (ref 40–150)
ALT SERPL W P-5'-P-CCNC: 143 U/L (ref 0–50)
ANION GAP SERPL CALCULATED.3IONS-SCNC: 10 MMOL/L (ref 7–15)
AST SERPL W P-5'-P-CCNC: 99 U/L (ref 0–45)
BASOPHILS # BLD AUTO: 0 10E3/UL (ref 0–0.2)
BASOPHILS NFR BLD AUTO: 0 %
BILIRUB DIRECT SERPL-MCNC: <0.2 MG/DL (ref 0–0.3)
BILIRUB SERPL-MCNC: 0.4 MG/DL
BUN SERPL-MCNC: 32.4 MG/DL (ref 8–23)
CALCIUM SERPL-MCNC: 9.7 MG/DL (ref 8.8–10.2)
CHLORIDE SERPL-SCNC: 105 MMOL/L (ref 98–107)
CREAT SERPL-MCNC: 1.26 MG/DL (ref 0.51–0.95)
CRP SERPL-MCNC: 146 MG/L
DEPRECATED HCO3 PLAS-SCNC: 23 MMOL/L (ref 22–29)
EGFRCR SERPLBLD CKD-EPI 2021: 42 ML/MIN/1.73M2
EOSINOPHIL # BLD AUTO: 0.1 10E3/UL (ref 0–0.7)
EOSINOPHIL NFR BLD AUTO: 1 %
ERYTHROCYTE [DISTWIDTH] IN BLOOD BY AUTOMATED COUNT: 12.6 % (ref 10–15)
ERYTHROCYTE [SEDIMENTATION RATE] IN BLOOD BY WESTERGREN METHOD: 58 MM/HR (ref 0–30)
GLUCOSE BLDC GLUCOMTR-MCNC: 151 MG/DL (ref 70–99)
GLUCOSE BLDC GLUCOMTR-MCNC: 151 MG/DL (ref 70–99)
GLUCOSE BLDC GLUCOMTR-MCNC: 160 MG/DL (ref 70–99)
GLUCOSE BLDC GLUCOMTR-MCNC: 262 MG/DL (ref 70–99)
GLUCOSE SERPL-MCNC: 168 MG/DL (ref 70–99)
GLUCOSE SERPL-MCNC: 168 MG/DL (ref 70–99)
HCT VFR BLD AUTO: 35.2 % (ref 35–47)
HGB BLD-MCNC: 11.4 G/DL (ref 11.7–15.7)
IMM GRANULOCYTES # BLD: 0.1 10E3/UL
IMM GRANULOCYTES NFR BLD: 1 %
INR PPP: 4.02 (ref 0.85–1.15)
LYMPHOCYTES # BLD AUTO: 0.4 10E3/UL (ref 0.8–5.3)
LYMPHOCYTES NFR BLD AUTO: 3 %
MCH RBC QN AUTO: 30.6 PG (ref 26.5–33)
MCHC RBC AUTO-ENTMCNC: 32.4 G/DL (ref 31.5–36.5)
MCV RBC AUTO: 94 FL (ref 78–100)
MONOCYTES # BLD AUTO: 1.3 10E3/UL (ref 0–1.3)
MONOCYTES NFR BLD AUTO: 9 %
NEUTROPHILS # BLD AUTO: 11.9 10E3/UL (ref 1.6–8.3)
NEUTROPHILS NFR BLD AUTO: 86 %
NRBC # BLD AUTO: 0 10E3/UL
NRBC BLD AUTO-RTO: 0 /100
PLATELET # BLD AUTO: 162 10E3/UL (ref 150–450)
POTASSIUM SERPL-SCNC: 5 MMOL/L (ref 3.4–5.3)
PROT SERPL-MCNC: 6.2 G/DL (ref 6.4–8.3)
RBC # BLD AUTO: 3.73 10E6/UL (ref 3.8–5.2)
SODIUM SERPL-SCNC: 138 MMOL/L (ref 135–145)
WBC # BLD AUTO: 13.9 10E3/UL (ref 4–11)

## 2024-06-15 PROCEDURE — 82247 BILIRUBIN TOTAL: CPT | Performed by: HOSPITALIST

## 2024-06-15 PROCEDURE — 73718 MRI LOWER EXTREMITY W/O DYE: CPT | Mod: RT

## 2024-06-15 PROCEDURE — 85610 PROTHROMBIN TIME: CPT | Performed by: HOSPITALIST

## 2024-06-15 PROCEDURE — 99232 SBSQ HOSP IP/OBS MODERATE 35: CPT | Performed by: HOSPITALIST

## 2024-06-15 PROCEDURE — 80048 BASIC METABOLIC PNL TOTAL CA: CPT | Performed by: HOSPITALIST

## 2024-06-15 PROCEDURE — 120N000001 HC R&B MED SURG/OB

## 2024-06-15 PROCEDURE — 97530 THERAPEUTIC ACTIVITIES: CPT | Mod: GP

## 2024-06-15 PROCEDURE — 250N000011 HC RX IP 250 OP 636: Performed by: HOSPITALIST

## 2024-06-15 PROCEDURE — 250N000013 HC RX MED GY IP 250 OP 250 PS 637: Performed by: INTERNAL MEDICINE

## 2024-06-15 PROCEDURE — 73721 MRI JNT OF LWR EXTRE W/O DYE: CPT | Mod: RT

## 2024-06-15 PROCEDURE — 97162 PT EVAL MOD COMPLEX 30 MIN: CPT | Mod: GP

## 2024-06-15 PROCEDURE — 85652 RBC SED RATE AUTOMATED: CPT

## 2024-06-15 PROCEDURE — 86140 C-REACTIVE PROTEIN: CPT

## 2024-06-15 PROCEDURE — 250N000011 HC RX IP 250 OP 636: Mod: JZ | Performed by: EMERGENCY MEDICINE

## 2024-06-15 PROCEDURE — 85025 COMPLETE CBC W/AUTO DIFF WBC: CPT | Performed by: HOSPITALIST

## 2024-06-15 PROCEDURE — 250N000013 HC RX MED GY IP 250 OP 250 PS 637: Performed by: HOSPITALIST

## 2024-06-15 PROCEDURE — 36415 COLL VENOUS BLD VENIPUNCTURE: CPT

## 2024-06-15 PROCEDURE — 36415 COLL VENOUS BLD VENIPUNCTURE: CPT | Performed by: HOSPITALIST

## 2024-06-15 RX ORDER — DEXTROSE MONOHYDRATE 25 G/50ML
25-50 INJECTION, SOLUTION INTRAVENOUS
Status: DISCONTINUED | OUTPATIENT
Start: 2024-06-15 | End: 2024-06-15

## 2024-06-15 RX ORDER — VANCOMYCIN HYDROCHLORIDE 1 G/200ML
1000 INJECTION, SOLUTION INTRAVENOUS EVERY 24 HOURS
Status: DISCONTINUED | OUTPATIENT
Start: 2024-06-15 | End: 2024-06-22 | Stop reason: HOSPADM

## 2024-06-15 RX ORDER — WARFARIN SODIUM 5 MG/1
5-7.5 TABLET ORAL SEE ADMIN INSTRUCTIONS
COMMUNITY

## 2024-06-15 RX ORDER — NICOTINE POLACRILEX 4 MG
15-30 LOZENGE BUCCAL
Status: DISCONTINUED | OUTPATIENT
Start: 2024-06-15 | End: 2024-06-15

## 2024-06-15 RX ADMIN — METOPROLOL SUCCINATE 25 MG: 25 TABLET, EXTENDED RELEASE ORAL at 08:15

## 2024-06-15 RX ADMIN — INSULIN ASPART 1 UNITS: 100 INJECTION, SOLUTION INTRAVENOUS; SUBCUTANEOUS at 14:38

## 2024-06-15 RX ADMIN — AMLODIPINE BESYLATE 5 MG: 5 TABLET ORAL at 08:15

## 2024-06-15 RX ADMIN — INSULIN GLARGINE 10 UNITS: 100 INJECTION, SOLUTION SUBCUTANEOUS at 21:50

## 2024-06-15 RX ADMIN — PRAVASTATIN SODIUM 40 MG: 20 TABLET ORAL at 21:48

## 2024-06-15 RX ADMIN — INSULIN ASPART 1 UNITS: 100 INJECTION, SOLUTION INTRAVENOUS; SUBCUTANEOUS at 17:38

## 2024-06-15 RX ADMIN — OXYCODONE HYDROCHLORIDE 5 MG: 5 TABLET ORAL at 03:11

## 2024-06-15 RX ADMIN — OXYCODONE HYDROCHLORIDE 5 MG: 5 TABLET ORAL at 12:55

## 2024-06-15 RX ADMIN — OXYCODONE HYDROCHLORIDE 5 MG: 5 TABLET ORAL at 08:15

## 2024-06-15 RX ADMIN — VANCOMYCIN HYDROCHLORIDE 1000 MG: 1 INJECTION, SOLUTION INTRAVENOUS at 08:49

## 2024-06-15 RX ADMIN — INSULIN ASPART 1 UNITS: 100 INJECTION, SOLUTION INTRAVENOUS; SUBCUTANEOUS at 08:15

## 2024-06-15 RX ADMIN — ONDANSETRON 4 MG: 2 INJECTION INTRAMUSCULAR; INTRAVENOUS at 17:37

## 2024-06-15 ASSESSMENT — ACTIVITIES OF DAILY LIVING (ADL)
ADLS_ACUITY_SCORE: 28
ADLS_ACUITY_SCORE: 28
ADLS_ACUITY_SCORE: 34
ADLS_ACUITY_SCORE: 28
ADLS_ACUITY_SCORE: 34
ADLS_ACUITY_SCORE: 33
ADLS_ACUITY_SCORE: 34

## 2024-06-15 NOTE — CONSULTS
ORTHOPEDIC CONSULTATION    Consultation  Francy Sherman,  1940, MRN [id number]    [facility]  Right leg pain [M79.604]  Sepsis, due to unspecified organism, unspecified whether acute organ dysfunction present (H) [A41.9]    PCP: Samy Suazo, [phone]   Code status:  No CPR- Do NOT Intubate       Extended Emergency Contact Information  Primary Emergency Contact: DANIELLE SHERMAN  Home Phone: [phone]  Mobile Phone: [phone]  Relation: Spouse         IMPRESSION:  84-year-old female with PMHx significant for insulin-dependent T2DM and A fib on Warfarin with acute right ankle swelling, pain and limited ROM following recent skin cancer excision right shin. Clinically concerning for infectious etiology. Elevated white count (13.9).     PLAN:  This patient was discussed with Dr. Wheatley, on-call surgeon for [facility] and they are in agreement with the following plan.    - Pain control per primary team  - Abx per primary team  - Will obtain inflammatory markers (ESR, CRP)  - Will obtain MR ankle & tib/fib to better evaluate soft tissues and see if fluid collection is present  - If fluid collection present on MRI, recommend aspiration  - Ortho will follow    Addendum:  MRI ankle demonstrating superficial soft tissue edema likely related to cellulitis. No evidence of abscess or soft tissue gas. High-grade tear of the anterior talofibular ligament, but no adjacent edema, indicating that this is chronic. Recommend boot for comfort, NSAIDs, appreciate therapy recs, WBAT, follow up OP.    MRI tib/fib demonstrating open wound anteromedial to the mid shaft of the tibia. There is a small underlying fluid collection superficial to the tibialis anterior muscle belly in this area. Recommend continuation of abx per primary team. Wound care referral. NSAIDs. NPO at MN in the setting patient is not improving and may need to consider OR washout. Ortho will see in AM.     Thank you for including [facility] in the care of Francy Sherman. It has been a pleasure participating in Francy's care.      CHIEF COMPLAINT: <principal problem not specified>    HISTORY OF PRESENT ILLNESS:  The patient is seen in orthopedic consultation at the request of Dr. Lucas.  The patient is a 84 year old female with moderate pain of the right lower leg. She notes removal of skin cancer this past Monday and further developed redness, swelling and painful ROM at the right ankle the the following day. She denies any initial drainage at her incision site but reports she has sutures at site of excision. She reports subjective fevers and chills. No injury to the right ankle but notes increasing pain since admission. No history of right ankle injuries or surgeries. Pain was quite severe last night at the ankle. Notes swelling/pain is limiting her motion. No numbness or tingling distally. PMHx significant for insulin-dependent T2DM and A fib on Warfarin. Admission also complicated by urinary retention.      PAST MEDICAL HISTORY:  Active Ambulatory Problems     Diagnosis Date Noted    Hyperkalemia 09/20/2019    ATN (acute tubular necrosis) (H24) 09/21/2019    Rapid atrial fibrillation (H) 09/21/2019    Acute renal failure (H24) 09/21/2019    HTN (hypertension) 09/21/2019    Hyperlipidemia 09/21/2019    DMII (diabetes mellitus, type 2) (H) 09/21/2019     Resolved Ambulatory Problems     Diagnosis Date Noted    No Resolved Ambulatory Problems     No Additional Past Medical History        ALLERGIES:   Review of patient's allergies indicates   Allergies   Allergen Reactions    Macrobid [Nitrofurantoin] Rash         MEDICATIONS UPON ADMISSION:  Medications were reviewed.  They include:   Medications Prior to Admission   Medication Sig Dispense Refill Last Dose    acetaminophen (TYLENOL) 325 MG tablet Take 650 mg by mouth every morning   6/13/2024    amLODIPine (NORVASC) 5 MG tablet [AMLODIPINE (NORVASC) 5 MG TABLET] Take 5 mg by mouth daily.   " 6/13/2024    cephALEXin (KEFLEX) 500 MG capsule Take 500 mg by mouth 2 times daily Start 7 day course 6/13/24 6/13/2024    glipiZIDE (GLUCOTROL) 5 MG tablet Take 5 mg by mouth 2 times daily (before meals)   6/13/2024    LANTUS SOLOSTAR U-100 INSULIN 100 unit/mL (3 mL) pen [LANTUS SOLOSTAR U-100 INSULIN 100 UNIT/ML (3 ML) PEN] Inject 10 Units under the skin at bedtime. 11.65 Type 2 with hyperglycemia  Contact provider if insulin prescribed is not the preferred insulin per insurance. (Patient taking differently: Inject Subcutaneous at bedtime Recent available clinic records suggest 16 units at bedtime.  Patient says she takes between \"50 and 250 units daily\" asked multiple times to clarify) 5 pen PRN 6/13/2024    metoprolol succinate (TOPROL-XL) 25 MG [METOPROLOL SUCCINATE (TOPROL-XL) 25 MG] Take 1 tablet (25 mg total) by mouth daily. 30 tablet 0 6/13/2024    pravastatin (PRAVACHOL) 40 MG tablet Take 40 mg by mouth at bedtime   6/13/2024    warfarin ANTICOAGULANT (COUMADIN) 5 MG tablet Take 5-7.5 mg by mouth See Admin Instructions Take 7.5mg Mon/Wed/Fri and take 5mg rest of week.            SOCIAL HISTORY:   she  reports that she has quit smoking. She has never used smokeless tobacco. She reports that she does not drink alcohol and does not use drugs.    FAMILY HISTORY:  family history is not on file.      REVIEW OF SYSTEMS:   Reviewed with patient. See HPI, otherwise negative.      PHYSICAL EXAMINATION:  Vitals: Temp:  [97.3  F (36.3  C)-97.9  F (36.6  C)] 97.8  F (36.6  C)  Pulse:  [79-91] 82  Resp:  [16-20] 16  BP: (126-165)/() 126/60  SpO2:  [91 %-97 %] 94 %  General: On examination, the patient is resting comfortably, NAD, awake, and alert and oriented to person, place, time, and, and general circumstances   SKIN: Bandage removed to evaluate anterior shin excision site. Coagulated blood overlying steri strips with surrounding erythema. Small amount of bleeding at the mid aspect of incision site. Mild " regression of erythema around outlined area at right lower leg otherwise surrounding erythema at the anterior shin. Notable swelling at the right foot and ankle. 1+ pitting edema.  Pulses:  dorsalis pedis and posterior tibial pulse is intact and equal bilaterally  Sensation: intact and equal bilaterally to the distal lower extremities.  Tenderness: . Minimal tenderness at the anterior shin. Nontender at the ankle.   ROM: Only able to wiggle toes. Painful passive ROM at the ankle in all directions.         RADIOGRAPHIC EVALUATION:  Personally reviewed.    EXAM: XR ANKLE RIGHT G/E 3 VIEWS, XR FOOT RIGHT 2 VIEWS  LOCATION: [facility]  DATE: 6/14/2024     INDICATION: Limited ROM at right ankle, cellulitis, ?effusion or evidence of infection.  COMPARISON: None.                                                                      IMPRESSION: Normal joint spaces and alignment. There is no evidence of an acute displaced right foot or ankle fracture. Ankle mortise is intact. Soft tissue swelling about the foot and ankle. Bones are demineralized.           EXAM: XR ANKLE RIGHT G/E 3 VIEWS, XR FOOT RIGHT 2 VIEWS  LOCATION: [facility]  DATE: 6/14/2024     INDICATION: Limited ROM at right ankle, cellulitis, ?effusion or evidence of infection.  COMPARISON: None.                                                                      IMPRESSION: Normal joint spaces and alignment. There is no evidence of an acute displaced right foot or ankle fracture. Ankle mortise is intact. Soft tissue swelling about the foot and ankle. Bones are demineralized.            EXAM: XR TIBIA AND FIBULA RIGHT 2 VIEWS  LOCATION: [facility]  DATE: 06/14/2024     INDICATION: Infection. Gas?  COMPARISON: None.                                                                      IMPRESSION: The right tibia and fibula are intact without evidence of a fracture. There is no cortical  destructive change to suggest osteomyelitis. Soft tissue swelling. No soft tissue gas identified. Degenerative changes right knee. Chondrocalcinosis.     Atherosclerotic vascular calcifications.          PERTINENT LABS:  Lab Results: personally reviewed.   Lab Results   Component Value Date    WBC 13.9 06/15/2024    HGB 11.4 06/15/2024    HCT 35.2 06/15/2024    MCV 94 06/15/2024     06/15/2024         CARLOS EDUARDO GODFREY PA-C  Date: 6/15/2024  Time: 11:07 AM    CC1:   Manuel Lucas MD    CC2:   Samy Suazo

## 2024-06-15 NOTE — PROVIDER NOTIFICATION
Notified Dr. Lucas r/t continued urinary retention with bladder scans > 500 ml. New orders for Villalobos Catheter.

## 2024-06-15 NOTE — PROGRESS NOTES
"[facility]    Medicine Progress Note - Hospitalist Service    Date of Admission:  6/14/2024    Assessment & Plan      Francy Sherman is a 84 year old female admitted with cellulitis in the setting of recent cancer excision.  She is clinically stable.   No fever, interval improvement in leukocytosis, and mild improvement in range of motion at the right ankle.  Given lack of significant improvement in pain/range of motion will consult orthopaedics to determine need for further evaluation of right ankle.  Course also complicated by urinary retention.  Monitor on void protocol, may need schilling catheter and outpatient void trial.    # Skin/soft tissue infection  - empiric vancomycin    # Urinary retention  - void protocol    # DM  - ISS    # Afib  - warfarin per pharmacy    # CKD 3b          Diet: Consistent Carbohydrate Diet Moderate Consistent Carb (60 g CHO per Meal) Diet  Snacks/Supplements Adult: Glucerna; With Meals    Schilling Catheter: Not present  Lines: None     Cardiac Monitoring: None  Code Status: No CPR- Do NOT Intubate      Clinically Significant Risk Factors Present on Admission               # Drug Induced Coagulation Defect: home medication list includes an anticoagulant medication    # Hypertension: Noted on problem list            # DMII: A1C = 7.0 % (Ref range: <5.7 %) within past 6 months   # Overweight: Estimated body mass index is 26.86 kg/m  as calculated from the following:    Height as of this encounter: 1.651 m (5' 5\").    Weight as of this encounter: 73.2 kg (161 lb 6.4 oz).       # Financial/Environmental Concerns: none         Disposition Plan     Medically Ready for Discharge:              Manuel Lucas MD  Hospitalist Service  [facility]  Securely message with [employer] (more info)  Text page via [employer] Paging/Directory   ______________________________________________________________________    Interval History   Reports having chills " overnight.  Has some improvement in the motion of toes in right foot.  No significant improvement in swelling.    Physical Exam   Vital Signs: Temp: 97.8  F (36.6  C) Temp src: Oral BP: 126/60 Pulse: 82   Resp: 16 SpO2: 94 % O2 Device: None (Room air)    Weight: 161 lbs 6.4 oz    Gen:  lying in bed in no extremis  Neuro:  alert, conversant  CV:  nl rate, regular rhythm  Pulm: no acute resp distress, ctab anteriorly  GI:  abdomen NTTP  MSK:  ongoing notable swelling at right foot with very modest improvement in range of motion at the right ankle; there is ongoing tenderness to passive ROM; modest regression of erythema from outlined area in right lower leg    Medical Decision Making             Data   Reviewed:    Na 138  K 5  BUN 32  Cr 1.3    WBC 14  Hgb 11  Plts 162    INR 4

## 2024-06-15 NOTE — PLAN OF CARE
Problem: Adult Inpatient Plan of Care  Goal: Absence of Hospital-Acquired Illness or Injury  Intervention: Identify and Manage Fall Risk  Recent Flowsheet Documentation  Taken 6/14/2024 2030 by Aby Jones RN  Safety Promotion/Fall Prevention:   activity supervised   assistive device/personal items within reach   clutter free environment maintained   increased rounding and observation   increase visualization of patient   lighting adjusted   mobility aid in reach   nonskid shoes/slippers when out of bed   patient and family education   room door open   safety round/check completed   room near nurse's station   room organization consistent   supervised activity   Goal Outcome Evaluation:      Plan of Care Reviewed With: patient    Overall Patient Progress: improvingOverall Patient Progress: improving  Pt alert and oriented, vss on room air, can voice needs, she is assist of two, due to void, was straight cath this morning, continues on iv abx.

## 2024-06-15 NOTE — PROGRESS NOTES
"CLINICAL NUTRITION SERVICES - ASSESSMENT NOTE     Nutrition Prescription    RECOMMENDATIONS FOR MDs/PROVIDERS TO ORDER:  none    Malnutrition Status:    Does not meet 2 criteria     Recommendations already ordered by Registered Dietitian (RD):  Robles daily     Future/Additional Recommendations:  Will monitor progress towards goals     REASON FOR ASSESSMENT  Francy Sherman is a/an 84 year old female assessed by the dietitian for Admission Nutrition Risk Screen for positive for wt loss, reduced po     Pertinent Medical Admission/History: Pt admitted with cellulitis on RLE with recent cancer excision, DM, afib, CKD    NUTRITION HISTORY  Allergies: NKFA  Pt lives with spouse in home  She states appetite has been down a few days and is related to pain. She does not wear dentures. No issues getting food at home. She has not ate much here again due to pain. She is going to try to eat more today. She monitors her carbohydrate intake at home for her DM and also checks her blood sugars.     CURRENT NUTRITION ORDERS  Diet: Moderate Consistent Carbohydrate  Intake/Tolerance: only one meal documented at 25%, pt ordered 2 meals on 6/14 worth 816kcal and 46g protein     PHYSICAL FINDINGS  See malnutrition section below.  Per flowsheet:  Edema- no edema noted   GI- WDL  Skin- cellulitis of RLE, appeared to have an open area with gauze on it  Pain- 5/10 pain, impacting appetite   Oral- no oral issues    LABS  Labs reviewed, BUN-32.4, Cr-1.26    MEDICATIONS  Medications reviewed, Novolog, Lantus    ANTHROPOMETRICS  Height: 165.1 cm (5' 5\")  Most Recent Weight: 73.2 kg (161 lb 6.4 oz)    IBW: 57 kg  BMI: Overweight BMI 25-29.9  Weight History:   Wt Readings from Last 10 Encounters:   06/14/24 73.2 kg (161 lb 6.4 oz)   04/18/22 72.6 kg (160 lb)         ASSESSED NUTRITION NEEDS  Dosing Weight: 57 kg  Estimated Energy Needs: 6447-6349 kcals/day (25 - 30 kcals/kg)  Justification: Maintenance  Estimated Protein Needs: 57-68 grams " protein/day (1 - 1.2 grams of pro/kg)  Justification: Maintenance/ healing  Estimated Fluid Needs: 7849-6444 mL/day (25 - 30 mL/kg)   Justification: Maintenance      MALNUTRITION:  % Weight Loss:  None noted  % Intake:  </= 50% for >/= 5 days (severe malnutrition)  Subcutaneous Fat Loss:  None observed  Muscle Loss:  None observed  Fluid Retention:  None noted    Malnutrition Diagnosis: Patient does not meet two of the above criteria necessary for diagnosing malnutrition        NUTRITION DIAGNOSIS  Inadequate protein-energy intake related to pain as evidenced by decreased intake      INTERVENTIONS  Implementation  Start Glucerna daily in strawberry or chocolate   Education Needs- no education needs noted at this time     Goals  Patient to consume % of nutritionally adequate meals three times per day, or the equivalent with supplements/snacks.  Promote healing as needed      Monitoring/Evaluation  Will monitor progress towards goals including po, skin status, supplement tolerance

## 2024-06-15 NOTE — PROGRESS NOTES
06/15/24 0350   Appointment Info   Signing Clinician's Name / Credentials (PT) Lesli Zimmerman, PT, DPT   Living Environment   People in Home spouse   Current Living Arrangements house   Home Accessibility stairs to enter home;stairs within home   Number of Stairs, Main Entrance 2   Stair Railings, Main Entrance railings safe and in good condition   Number of Stairs, Within Home, Primary greater than 10 stairs   Stair Railings, Within Home, Primary railings safe and in good condition   Living Environment Comments Pt reports goes to basement often to do laundry   Self-Care   Equipment Currently Used at Home walker, rolling   General Information   Onset of Illness/Injury or Date of Surgery 06/14/24   Referring Physician Manuel Lucas MD   Patient/Family Therapy Goals Statement (PT) to get better   Pertinent History of Current Problem (include personal factors and/or comorbidities that impact the POC) 84 year old female presenting with RLE pain in the setting of recent cancer excision.  She is hemodynamically stable.  Exam notable for cellulitis associated with the excision site.  There is notable lower extremity edema in the RLE.  Lower concern for DVT given she is therapeutically anticoagulated.  Limited ROM due to pain at the right ankle may be due to swelling alone.  However, further evaluation with XR.  Hesitant to pursue arthrocentesis given possibility for seeding infection.  Depending on clinical course may need to pursue further evaluation with orthopaedics and consider arthrocentesis.   Existing Precautions/Restrictions fall   Weight-Bearing Status - LLE full weight-bearing   Weight-Bearing Status - RLE weight-bearing as tolerated   Bed Mobility   Bed Mobility supine-sit;sit-supine   Supine-Sit [place] (Bed Mobility) verbal cues;minimum assist (75% patient effort)   Sit-Supine [place] (Bed Mobility) verbal cues;minimum assist (75% patient effort)   Comment, (Bed Mobility) Min assist x 1 with  supine<>sit transfers. Assist with supporting RLE.   Transfers   Transfers sit-stand transfer   Comment, (Transfers) Min assist x 1 with FWW for sit<>stand transfers. Verbal cues for safety and safe hand placement.   Sit-Stand Transfer   Sit-Stand [place] (Transfers) verbal cues;minimum assist (75% patient effort);1 person assist   Assistive Device (Sit-Stand Transfers) walker, front-wheeled   Comment, (Sit-Stand Transfer) Min assist x 1 with FWW for sit<>stand transfers. Verbal cues for safety and safe hand placement.   Gait/Stairs (Locomotion)   Comment, (Gait/Stairs) Not assessed due to pt's pain   Clinical Impression   Criteria for Skilled Therapeutic Intervention Yes, treatment indicated   PT Diagnosis (PT) impaired functional mobility   Influenced by the following impairments weakness, pain   Functional limitations due to impairments transfers, ambulation, stairs   Clinical Presentation (PT Evaluation Complexity) stable   Clinical Presentation Rationale Pt presents as medically diagnosed   Clinical Decision Making (Complexity) moderate complexity   Planned Therapy Interventions (PT) balance training;bed mobility training;gait training;home exercise program;patient/family education;ROM (range of motion);stair training;strengthening;stretching;transfer training   Risk & Benefits of therapy have been explained evaluation/treatment results reviewed;care plan/treatment goals reviewed;patient;spouse/significant other;daughter;son   PT Total Evaluation Time   PT Eval, Moderate Complexity Minutes (00455) 10   Physical Therapy Goals   PT Frequency 6x/week   PT Predicted Duration/Target Date for Goal Attainment 06/25/24   PT Goals Transfers;Gait;Stairs   PT: Transfers Modified independent;Sit to/from stand;Assistive device  (FWW)   PT: Gait Modified independent;Assistive device;Rolling walker;10 feet  (FWW)   PT: Stairs Supervision/stand-by assist;2 stairs;Rail on both sides   Interventions   Interventions Quick  Adds Therapeutic Activity   Therapeutic Activity   Therapeutic Activities: dynamic activities to improve functional performance Minutes (11511) 20   Symptoms Noted During/After Treatment Increased pain   Treatment Detail/Skilled Intervention Pt supine in bed. Encouragement for pt to participate in PT session. Pt's family and spouse present in room. Min assist x 1 with supine to sit transfers, assist with supporting RLE. Pt has fair sitting balance at edge of bed. Min assist x 1 with FWW for sit<>stand transfers. Verbal cues for safety and safe hand placement. Pt only able to tolerate standing for about 15 seconds until requesting to sit back down. Pt unable to put weight through RLE in standing due to pain. Min assist x 1 with sit to supine transfer. Pt reports feeling of nausea when sitting back down. RN notified. Pt supine with bed alarm on and call light within reach.   PT Discharge Planning   PT Plan Progress transfers and ambulation as tolerated, therex   PT Discharge Recommendation (DC Rec) (S)  Transitional Care Facility   PT Rationale for DC Rec Pt currently limited due to pain and unable to weight bear through RLE. Pt has good home support, but needs to negotiate steps at home. Min assist with transfers. Pt would benefit from 24 hour assist for safety and continued PT to improve functional mobility.   PT Brief overview of current status Min assist x 1 with supine<>sit and sit<>stand transfers.   Total Session Time   Timed Code Treatment Minutes 20   Total Session Time (sum of timed and untimed services) 30       Lseli Zimmerman, PT, DPT

## 2024-06-15 NOTE — PLAN OF CARE
Problem: Adult Inpatient Plan of Care  Goal: Plan of Care Review  Description: The Plan of Care Review/Shift note should be completed every shift.  The Outcome Evaluation is a brief statement about your assessment that the patient is improving, declining, or no change.  This information will be displayed automatically on your shift  note.  Outcome: Not Progressing   Goal Outcome Evaluation:  Continues to report moderate to severe right shin/leg pain. Managing with Oxycodone. Remains on IV Vancomycin. [place]al[place] catheter inserted for continued urinary retention. MRI for ankle + tib/fib ordered and started at approx. 1600.     **ADDENDUM 6:48 PM 06/15/24 - MRI resulted. Patient now NPO at midnight (6/16) per Orthopedics for surgical intervention.

## 2024-06-15 NOTE — PHARMACY-VANCOMYCIN DOSING SERVICE
Pharmacy Vancomycin Empiric Dose Adjustment  Date of Service Trudy 15, 2024  Patient's  1940  84 year old, female    Indication: Skin and Soft Tissue Infection    Current estimated CrCl = Estimated Creatinine Clearance: 33.9 mL/min (A) (based on SCr of 1.26 mg/dL (H)).    Creatinine for last 3 days  2024:  7:44 AM Creatinine 1.62 mg/dL  6/15/2024:  6:31 AM Creatinine 1.26 mg/dL    Recent Vancomycin Level(s) for last 3 days  No results found for requested labs within last 3 days.    Current Vancomycin dosinmg IV q24h      Vancomycin IV Administrations (past 72 hours)                     vancomycin (VANCOCIN) 1,000 mg in NaCl 0.9% 200 mL intermittent infusion (mg) 1,000 mg Given 24 0800                    Nephrotoxins and other renal medications (From now, onward)      Start     Dose/Rate Route Frequency Ordered Stop    06/15/24 0800  vancomycin (VANCOCIN) 750 mg in 0.9% NaCl 250 mL intermittent infusion         750 mg  over 60 Minutes Intravenous EVERY 24 HOURS 24 1243              Contrast Orders - past 72 hours (72h ago, onward)      None          [employer] Prediction of CURRENT Vancomycin Regimen  Loading dose: N/A  Regimen: 750 mg IV every 24 hours.  Start time: 20:00 on 2024  Exposure target: AUC24 (range)400-600 mg/L.hr   AUC24,ss: 398 mg/L.hr  Probability of AUC24 > 400: 49 %  Ctrough,ss: 12.8 mg/L  Probability of Ctrough,ss > 20: 12 %  Probability of nephrotoxicity (Lodise DELMER ): 8 %      [employer] Prediction of Planned NEW Vancomycin Regimen  Loading dose: N/A  Regimen: 1000 mg IV every 24 hours.  Start time: 20:00 on 2024  Exposure target: AUC24 (range)400-600 mg/L.hr   AUC24,ss: 520 mg/L.hr  Probability of AUC24 > 400: 78 %  Ctrough,ss: 16.7 mg/L  Probability of Ctrough,ss > 20: 33 %  Probability of nephrotoxicity (Lodise DELMER ): 12 %       Plan:  Increase vancomycin dose to 1000mg IV q24h  Vancomycin monitoring method: AUC  Vancomycin therapeutic monitoring  goal: 400-600 mg*h/L  Pharmacy will check vancomycin levels as appropriate in 1-3 Days.    Serum creatinine levels will be ordered daily for the first week of therapy and at least twice weekly for subsequent weeks.      Owen Stevenson RPH

## 2024-06-16 ENCOUNTER — APPOINTMENT (OUTPATIENT)
Dept: OCCUPATIONAL THERAPY | Facility: CLINIC | Age: 84
DRG: 862 | End: 2024-06-16
Attending: HOSPITALIST
Payer: COMMERCIAL

## 2024-06-16 ENCOUNTER — APPOINTMENT (OUTPATIENT)
Dept: PHYSICAL THERAPY | Facility: CLINIC | Age: 84
DRG: 862 | End: 2024-06-16
Payer: COMMERCIAL

## 2024-06-16 LAB
ALBUMIN SERPL BCG-MCNC: 3.2 G/DL (ref 3.5–5.2)
ALP SERPL-CCNC: 115 U/L (ref 40–150)
ALT SERPL W P-5'-P-CCNC: 97 U/L (ref 0–50)
ANION GAP SERPL CALCULATED.3IONS-SCNC: 12 MMOL/L (ref 7–15)
AST SERPL W P-5'-P-CCNC: 50 U/L (ref 0–45)
BILIRUB SERPL-MCNC: 0.5 MG/DL
BUN SERPL-MCNC: 33.7 MG/DL (ref 8–23)
CALCIUM SERPL-MCNC: 8.9 MG/DL (ref 8.8–10.2)
CHLORIDE SERPL-SCNC: 105 MMOL/L (ref 98–107)
CREAT SERPL-MCNC: 1.26 MG/DL (ref 0.51–0.95)
DEPRECATED HCO3 PLAS-SCNC: 21 MMOL/L (ref 22–29)
EGFRCR SERPLBLD CKD-EPI 2021: 42 ML/MIN/1.73M2
GLUCOSE BLDC GLUCOMTR-MCNC: 142 MG/DL (ref 70–99)
GLUCOSE BLDC GLUCOMTR-MCNC: 163 MG/DL (ref 70–99)
GLUCOSE BLDC GLUCOMTR-MCNC: 167 MG/DL (ref 70–99)
GLUCOSE BLDC GLUCOMTR-MCNC: 241 MG/DL (ref 70–99)
GLUCOSE SERPL-MCNC: 176 MG/DL (ref 70–99)
HOLD SPECIMEN: NORMAL
INR PPP: 2.87 (ref 0.85–1.15)
POTASSIUM SERPL-SCNC: 4.5 MMOL/L (ref 3.4–5.3)
PROT SERPL-MCNC: 6 G/DL (ref 6.4–8.3)
SODIUM SERPL-SCNC: 138 MMOL/L (ref 135–145)

## 2024-06-16 PROCEDURE — 120N000001 HC R&B MED SURG/OB

## 2024-06-16 PROCEDURE — 250N000011 HC RX IP 250 OP 636: Mod: JZ | Performed by: EMERGENCY MEDICINE

## 2024-06-16 PROCEDURE — 250N000011 HC RX IP 250 OP 636: Performed by: HOSPITALIST

## 2024-06-16 PROCEDURE — 85610 PROTHROMBIN TIME: CPT | Performed by: HOSPITALIST

## 2024-06-16 PROCEDURE — 36415 COLL VENOUS BLD VENIPUNCTURE: CPT | Performed by: HOSPITALIST

## 2024-06-16 PROCEDURE — 97530 THERAPEUTIC ACTIVITIES: CPT | Mod: GP

## 2024-06-16 PROCEDURE — 80053 COMPREHEN METABOLIC PANEL: CPT | Performed by: HOSPITALIST

## 2024-06-16 PROCEDURE — 250N000013 HC RX MED GY IP 250 OP 250 PS 637: Performed by: HOSPITALIST

## 2024-06-16 PROCEDURE — 97535 SELF CARE MNGMENT TRAINING: CPT | Mod: GO

## 2024-06-16 PROCEDURE — 97165 OT EVAL LOW COMPLEX 30 MIN: CPT | Mod: GO

## 2024-06-16 PROCEDURE — 97116 GAIT TRAINING THERAPY: CPT | Mod: GP

## 2024-06-16 PROCEDURE — 250N000013 HC RX MED GY IP 250 OP 250 PS 637: Performed by: INTERNAL MEDICINE

## 2024-06-16 PROCEDURE — 99232 SBSQ HOSP IP/OBS MODERATE 35: CPT | Performed by: HOSPITALIST

## 2024-06-16 RX ORDER — METHOCARBAMOL 500 MG/1
500 TABLET, FILM COATED ORAL 3 TIMES DAILY PRN
Status: DISCONTINUED | OUTPATIENT
Start: 2024-06-16 | End: 2024-06-22 | Stop reason: HOSPADM

## 2024-06-16 RX ADMIN — ACETAMINOPHEN 650 MG: 325 TABLET ORAL at 10:21

## 2024-06-16 RX ADMIN — VANCOMYCIN HYDROCHLORIDE 1000 MG: 1 INJECTION, SOLUTION INTRAVENOUS at 08:21

## 2024-06-16 RX ADMIN — AMLODIPINE BESYLATE 5 MG: 5 TABLET ORAL at 08:21

## 2024-06-16 RX ADMIN — ONDANSETRON 4 MG: 2 INJECTION INTRAMUSCULAR; INTRAVENOUS at 10:21

## 2024-06-16 RX ADMIN — OXYCODONE HYDROCHLORIDE 5 MG: 5 TABLET ORAL at 18:48

## 2024-06-16 RX ADMIN — PRAVASTATIN SODIUM 40 MG: 20 TABLET ORAL at 21:11

## 2024-06-16 RX ADMIN — ACETAMINOPHEN 650 MG: 325 TABLET ORAL at 18:48

## 2024-06-16 RX ADMIN — METHOCARBAMOL 500 MG: 500 TABLET ORAL at 21:11

## 2024-06-16 RX ADMIN — METHOCARBAMOL 500 MG: 500 TABLET ORAL at 12:59

## 2024-06-16 RX ADMIN — INSULIN GLARGINE 10 UNITS: 100 INJECTION, SOLUTION SUBCUTANEOUS at 21:13

## 2024-06-16 RX ADMIN — METOPROLOL SUCCINATE 25 MG: 25 TABLET, EXTENDED RELEASE ORAL at 08:21

## 2024-06-16 RX ADMIN — INSULIN ASPART 1 UNITS: 100 INJECTION, SOLUTION INTRAVENOUS; SUBCUTANEOUS at 06:46

## 2024-06-16 RX ADMIN — OXYCODONE HYDROCHLORIDE 5 MG: 5 TABLET ORAL at 10:21

## 2024-06-16 ASSESSMENT — ACTIVITIES OF DAILY LIVING (ADL)
ADLS_ACUITY_SCORE: 33
ADLS_ACUITY_SCORE: 34
ADLS_ACUITY_SCORE: 27
ADLS_ACUITY_SCORE: 34
ADLS_ACUITY_SCORE: 27
ADLS_ACUITY_SCORE: 33
ADLS_ACUITY_SCORE: 34
ADLS_ACUITY_SCORE: 27
ADLS_ACUITY_SCORE: 33
ADLS_ACUITY_SCORE: 33
ADLS_ACUITY_SCORE: 34
ADLS_ACUITY_SCORE: 33
ADLS_ACUITY_SCORE: 33
ADLS_ACUITY_SCORE: 27
ADLS_ACUITY_SCORE: 34
ADLS_ACUITY_SCORE: 27
ADLS_ACUITY_SCORE: 33
ADLS_ACUITY_SCORE: 34
ADLS_ACUITY_SCORE: 33
ADLS_ACUITY_SCORE: 27
ADLS_ACUITY_SCORE: 34
ADLS_ACUITY_SCORE: 33
ADLS_ACUITY_SCORE: 33

## 2024-06-16 NOTE — PROGRESS NOTES
"[facility]    Medicine Progress Note - Hospitalist Service    Date of Admission:  6/14/2024    Assessment & Plan   Francy Sherman is a 84 year old female admitted with cellulitis in the setting of recent cancer excision.  She is clinically stable.   MRI demonstrating possible abscess and myositis.  She has evidence of clinical improvement with improved weight bearing and range of motion.  Continue empiric vancomycin.   Evaluation with orthopaedics ongoing.   Hold warfarin pending decision on operative intervention.      # Skin/soft tissue infection  - empiric vancomycin    # Urinary retention  - schilling catheter    # DM  - ISS    # Afib  - hold warfarin for now    # CKD 3b          Diet: Snacks/Supplements Adult: Glucerna; With Meals  NPO per Anesthesia Guidelines for Procedure/Surgery Except for: Meds    Schilling Catheter: PRESENT, indication: Acute retention or obstruction  Lines: None     Cardiac Monitoring: None  Code Status: No CPR- Do NOT Intubate      Clinically Significant Risk Factors              # Hypoalbuminemia: Lowest albumin = 3.4 g/dL at 6/15/2024  6:31 AM, will monitor as appropriate  # Coagulation Defect: INR = 2.87 (Ref range: 0.85 - 1.15) and/or PTT = 64 Seconds (Ref range: 22 - 38 Seconds), will monitor for bleeding    # Hypertension: Noted on problem list             # DMII: A1C = 7.0 % (Ref range: <5.7 %) within past 6 months   # Overweight: Estimated body mass index is 26.86 kg/m  as calculated from the following:    Height as of this encounter: 1.651 m (5' 5\").    Weight as of this encounter: 73.2 kg (161 lb 6.4 oz)., PRESENT ON ADMISSION     # Financial/Environmental Concerns: none         Disposition Plan     Medically Ready for Discharge:              Manuel Lucas MD  Hospitalist Service  [facility]  Securely message with [employer] (more info)  Text page via [employer] Paging/Directory "   ______________________________________________________________________    Interval History   Reports having had groin pain which resolved.  Had sharp foot pain radiating upward which has since improved.  Reports she was able to bear weight.    Physical Exam   Vital Signs: Temp: 98.6  F (37  C) Temp src: Oral BP: 121/63 Pulse: 85   Resp: 16 SpO2: 96 % O2 Device: None (Room air)    Weight: 161 lbs 6.4 oz    Gen:  sitting in chair in no extremis  Neuro: alert, conversant  MSK:  right foot with grossly unchanged swelling; RLE with grossly unchanged erythema and warmth; modest interval improvement in passive flexion/extension at right ankle with tenderness elicited during exam  CV:  nl rate, regular rhythm  Pulm: no acute resp distress, ctab anteriorly    Medical Decision Making             Data   Reviewed:    Na 138  K 4.5  BUN 34  Cr 1.3    ALT 97  AST 50    INR 2.9    MRI tib/fib right  IMPRESSION:  1.  Open wound anteromedial to the mid shaft of the tibia. There is a small underlying fluid collection which may be an abscess.  2.  Diffuse superficial soft tissue edema.  3.  Diffuse muscle edema which may be due to myositis.  4.  No evidence of osteomyelitis.

## 2024-06-16 NOTE — PROGRESS NOTES
06/16/24 1055   Appointment Info   Signing Clinician's Name / Credentials (OT) Saskia Cooper, OTR/L   Rehab Comments (OT) OT Evaluation   Living Environment   People in Home spouse   Current Living Arrangements house   Living Environment Comments Rambler-laundry in basement. Reports grandsusanne put grab bars throughout the home/stairwells. Using halfwall and door frame by RTS, considering purchases safety frame.   Self-Care   Usual Activity Tolerance good   Current Activity Tolerance fair   Number of times patient has fallen within last six months 2   Instrumental Activities of Daily Living (IADL)   IADL Comments Pt reports I with IADLs at baseline including finances/driving/shopping/cooking. 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   General Information   Onset of Illness/Injury or Date of Surgery 06/14/24   Referring Physician Manuel Lucas MD   Patient/Family Therapy Goal Statement (OT) None stated   Additional Occupational Profile Info/Pertinent History of Current Problem Recent R ankle cancer excision, currently cellutlits. Pt unable to ambulate, recent fall   Right Lower Extremity (Weight-bearing Status) weight-bearing as tolerated (WBAT)   Cognitive Status Examination   Cognitive Status Comments Pt following 1 step directions, easily distracted, humorous throughout session, Forgetful at times.   Pain Assessment   Patient Currently in Pain Yes, see Vital Sign flowsheet  (cramping in R hip area-RN notified)   Range of Motion Comprehensive   General Range of Motion bilateral upper extremity ROM WFL   Strength Comprehensive (MMT)   Comment, General Manual Muscle Testing (MMT) Assessment WFL   Bed Mobility   Bed Mobility supine-sit;sit-supine   Supine-Sit [place] (Bed Mobility) minimum assist (75% patient effort)   Sit-Supine [place] (Bed Mobility) minimum assist (75% patient effort)   Transfers   Transfers  sit-stand transfer;toilet transfer;bed-chair transfer   Transfer Skill: Bed to Chair/Chair to Bed   Bed-Chair [place] (Transfers) minimum assist (75% patient effort)   Sit-Stand Transfer   Sit-Stand [place] (Transfers) minimum assist (75% patient effort)   Toilet Transfer   Toilet Transfer Comments Min A   Balance   Balance Assessment standing dynamic balance   Balance Comments SBA/CGA   Activities of Daily Living   BADL Assessment/Intervention lower body dressing;toileting   Lower Body Dressing Assessment/Training   [place] Level (Lower Body Dressing) minimum assist (75% patient effort)   Toileting   Comment, (Toileting) catheter   [place] Level (Toileting) maximum assist (25% patient effort)   Clinical Impression   Criteria for Skilled Therapeutic Interventions Met (OT) Yes, treatment indicated   OT Diagnosis Decreased ADL independent   OT Problem List-Impairments impacting ADL balance;cognition;mobility;pain   Assessment of Occupational Performance 3-5 Performance Deficits   Identified Performance Deficits dressing, bathing, toileting, bed mob, STS   Planned Therapy Interventions (OT) ADL retraining;bed mobility training;transfer training   Clinical Decision Making Complexity (OT) problem focused assessment/low complexity   Risk & Benefits of therapy have been explained evaluation/treatment results reviewed   OT Total Evaluation Time   OT Eval, Low Complexity Minutes (68428) 10   OT Goals   Therapy Frequency (OT) 5 times/week   OT Predicted Duration/Target Date for Goal Attainment 06/23/24   OT Goals Lower Body Dressing;Bed Mobility;Toilet Transfer/Toileting;Hygiene/Grooming   OT: Hygiene/Grooming modified independent   OT: Lower Body Dressing Modified independent   OT: Bed Mobility Modified independent   OT: Toilet Transfer/Toileting Modified independent   Interventions   Interventions Quick Adds Self-Care/Home Management   Self-Care/Home Management   Self-Care/Home Mgmt/ADL, Compensatory,  Meal Prep Minutes (34492) 23   Symptoms Noted During/After Treatment (Meal Preparation/Planning Training)   (cramp in R hip, lightheaded)   Treatment Detail/Skilled Intervention Pt in bed uopn arrival and agreeable to OT session. SBA for supine>sit from flat bed pushing off cane w/R UE and propelling LE in air-as pt does at home. Pt reported light headedness at EOB and cramping in R hip. Sat at EOB 3 minutes, mild relief. Completed STS w/CGA-Min A and cues for hand placement. Stood near 2 minutes wb thru FWW, then VC scoot self to HOB. Completed log roll w/SBA. Max A to doff brief and adjust catheter tubing.   OT Discharge Planning   OT Plan Advance transfers-commode, chair, Standing ADLs w/seat behind   OT Discharge Recommendation (DC Rec) Transitional Care Facility   OT Rationale for DC Rec Currently at this time pt unable to transfer and complete ADLs due to pain and cramping. 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 Pt benefit from further OT and pending progress may return home.   OT Brief overview of current status SBA w/bed mob, increased pain, stood 1 minute than needing to sit   Total Session Time   Timed Code Treatment Minutes 23   Total Session Time (sum of timed and untimed services) 33

## 2024-06-16 NOTE — PROGRESS NOTES
"Orthopedic Progress Note      Assessment:    84-year-old female with PMHx significant for insulin-dependent T2DM and A fib on Warfarin with acute right ankle swelling, pain and limited ROM following recent skin cancer excision right shin. Clinically concerning for infectious etiology. Elevated white count (13.9).     Plan:   As patient is feeling better today, now able to bear weight and erythema and swelling have improved, no indication for surgical intervention today. Continue antibiotics.    - Pain control per primary team  - Abx per primary team  - WBAT on RLE  - NPO at midnight in case she is not improving - ok to eat and drink today  - Ortho will follow      Subjective:  Patient seen today laying in bed. She says she is improving and \"this is the best it's felt in days.\" Continues to have some occasional sharp pain. She states she is now able to bear weight, which she wasn't able to yesterday. States calf is feeling softer today. She denies fever, chills, chest pain, SOB, nausea, vomiting, lightheadedness or dizziness.    Objective:  /53 (BP Location: Left arm)   Pulse 73   Temp 98.2  F (36.8  C) (Oral)   Resp 16   Ht 1.651 m (5' 5\")   Wt 73.2 kg (161 lb 6.4 oz)   SpO2 93%   BMI 26.86 kg/m    The patient is A&Ox3. Laying in bed. Appears comfortable.  Sensation is intact to light touch in superficial peroneal, deep peroneal, tibial and sural nerve distributions.  Dorsiflexion and plantar flexion is intact, but decreased ROM. Able to flex and extend all toes.  Dorsalis pedis pulse intact.  Calves are soft and non-tender. Negative Thomas's.  The incision is covered. Dressing C/D/I. Regression of erythema compared to prior outlined area on right anterior lower leg. Tenderness to palpation of the anterior shin. Right ankle nontender to palpation.      Pertinent Labs   Lab Results: personally reviewed.   Lab Results   Component Value Date    INR 4.02 (H) 06/15/2024    INR 4.03 (H) 06/14/2024    INR 4.15 " (H) 06/14/2024     Lab Results   Component Value Date    WBC 13.9 (H) 06/15/2024    HGB 11.4 (L) 06/15/2024    HCT 35.2 06/15/2024    MCV 94 06/15/2024     06/15/2024     Lab Results   Component Value Date     06/15/2024    CO2 23 06/15/2024         Report completed by:  RAY ZARAGOZA PA-C  [facility]    Date: 6/16/2024  Time: 7:11 AM

## 2024-06-16 NOTE — PLAN OF CARE
Problem: Adult Inpatient Plan of Care  Goal: Plan of Care Review  Description: The Plan of Care Review/Shift note should be completed every shift.  The Outcome Evaluation is a brief statement about your assessment that the patient is improving, declining, or no change.  This information will be displayed automatically on your shift  note.  Outcome: Progressing   Goal Outcome Evaluation:  Noted decrease in RLE erythema. Continuing to manage pain with oral meds. Remains on IV Vanco. NPO at midnight and Orthopedics to reevaluate tomorrow morning.

## 2024-06-16 NOTE — PLAN OF CARE
Problem: Adult Inpatient Plan of Care  Goal: Optimal Comfort and Wellbeing  Outcome: Progressing  Intervention: Monitor Pain and Promote Comfort  Recent Flowsheet Documentation  Taken 6/16/2024 0139 by Francy Nguyen RN  Pain Management Interventions:   declines   medication offered but refused     Problem: Risk for Delirium  Goal: Optimal Coping  Outcome: Progressing   Goal Outcome Evaluation:       Patent alert and oriented x4. Intermittent confusion. Forgetful. Reported pain 5/10 on right lower extremity, declined medication. Room air. Villalobos placed during prior shift due to urine retention. New IV placed. NPO. Surgery to consult today. Per report patient is assist of 1-2 to the commode, incontinent of stool. VSS.

## 2024-06-17 ENCOUNTER — APPOINTMENT (OUTPATIENT)
Dept: PHYSICAL THERAPY | Facility: CLINIC | Age: 84
DRG: 862 | End: 2024-06-17
Payer: COMMERCIAL

## 2024-06-17 ENCOUNTER — APPOINTMENT (OUTPATIENT)
Dept: RADIOLOGY | Facility: CLINIC | Age: 84
DRG: 862 | End: 2024-06-17
Attending: HOSPITALIST
Payer: COMMERCIAL

## 2024-06-17 LAB
CREAT SERPL-MCNC: 1.4 MG/DL (ref 0.51–0.95)
EGFRCR SERPLBLD CKD-EPI 2021: 37 ML/MIN/1.73M2
ERYTHROCYTE [DISTWIDTH] IN BLOOD BY AUTOMATED COUNT: 12.6 % (ref 10–15)
GLUCOSE BLDC GLUCOMTR-MCNC: 131 MG/DL (ref 70–99)
GLUCOSE BLDC GLUCOMTR-MCNC: 140 MG/DL (ref 70–99)
GLUCOSE BLDC GLUCOMTR-MCNC: 144 MG/DL (ref 70–99)
GLUCOSE BLDC GLUCOMTR-MCNC: 158 MG/DL (ref 70–99)
HCT VFR BLD AUTO: 33.5 % (ref 35–47)
HGB BLD-MCNC: 10.8 G/DL (ref 11.7–15.7)
HOLD SPECIMEN: NORMAL
INR PPP: 1.93 (ref 0.85–1.15)
MCH RBC QN AUTO: 30.7 PG (ref 26.5–33)
MCHC RBC AUTO-ENTMCNC: 32.2 G/DL (ref 31.5–36.5)
MCV RBC AUTO: 95 FL (ref 78–100)
PLATELET # BLD AUTO: 201 10E3/UL (ref 150–450)
RBC # BLD AUTO: 3.52 10E6/UL (ref 3.8–5.2)
WBC # BLD AUTO: 10.3 10E3/UL (ref 4–11)

## 2024-06-17 PROCEDURE — 120N000001 HC R&B MED SURG/OB

## 2024-06-17 PROCEDURE — G0463 HOSPITAL OUTPT CLINIC VISIT: HCPCS

## 2024-06-17 PROCEDURE — 99232 SBSQ HOSP IP/OBS MODERATE 35: CPT | Performed by: HOSPITALIST

## 2024-06-17 PROCEDURE — 250N000013 HC RX MED GY IP 250 OP 250 PS 637: Performed by: HOSPITALIST

## 2024-06-17 PROCEDURE — 250N000013 HC RX MED GY IP 250 OP 250 PS 637: Performed by: INTERNAL MEDICINE

## 2024-06-17 PROCEDURE — 97530 THERAPEUTIC ACTIVITIES: CPT | Mod: GP

## 2024-06-17 PROCEDURE — 73030 X-RAY EXAM OF SHOULDER: CPT | Mod: RT

## 2024-06-17 PROCEDURE — 85014 HEMATOCRIT: CPT | Performed by: HOSPITALIST

## 2024-06-17 PROCEDURE — 250N000011 HC RX IP 250 OP 636: Performed by: HOSPITALIST

## 2024-06-17 PROCEDURE — 36415 COLL VENOUS BLD VENIPUNCTURE: CPT | Performed by: HOSPITALIST

## 2024-06-17 PROCEDURE — 85610 PROTHROMBIN TIME: CPT | Performed by: HOSPITALIST

## 2024-06-17 PROCEDURE — 82565 ASSAY OF CREATININE: CPT | Performed by: HOSPITALIST

## 2024-06-17 RX ORDER — WARFARIN SODIUM 7.5 MG/1
7.5 TABLET ORAL
Status: COMPLETED | OUTPATIENT
Start: 2024-06-17 | End: 2024-06-17

## 2024-06-17 RX ADMIN — OXYCODONE HYDROCHLORIDE 5 MG: 5 TABLET ORAL at 13:49

## 2024-06-17 RX ADMIN — INSULIN GLARGINE 10 UNITS: 100 INJECTION, SOLUTION SUBCUTANEOUS at 22:17

## 2024-06-17 RX ADMIN — OXYCODONE HYDROCHLORIDE 5 MG: 5 TABLET ORAL at 01:44

## 2024-06-17 RX ADMIN — PRAVASTATIN SODIUM 40 MG: 20 TABLET ORAL at 22:06

## 2024-06-17 RX ADMIN — METOPROLOL SUCCINATE 25 MG: 25 TABLET, EXTENDED RELEASE ORAL at 09:25

## 2024-06-17 RX ADMIN — AMLODIPINE BESYLATE 5 MG: 5 TABLET ORAL at 09:25

## 2024-06-17 RX ADMIN — WARFARIN SODIUM 7.5 MG: 7.5 TABLET ORAL at 17:00

## 2024-06-17 RX ADMIN — OXYCODONE HYDROCHLORIDE 5 MG: 5 TABLET ORAL at 22:06

## 2024-06-17 RX ADMIN — VANCOMYCIN HYDROCHLORIDE 1000 MG: 1 INJECTION, SOLUTION INTRAVENOUS at 10:10

## 2024-06-17 RX ADMIN — OXYCODONE HYDROCHLORIDE 5 MG: 5 TABLET ORAL at 09:25

## 2024-06-17 ASSESSMENT — ACTIVITIES OF DAILY LIVING (ADL)
ADLS_ACUITY_SCORE: 33
ADLS_ACUITY_SCORE: 31
ADLS_ACUITY_SCORE: 33
ADLS_ACUITY_SCORE: 31
ADLS_ACUITY_SCORE: 33

## 2024-06-17 NOTE — PROGRESS NOTES
Care Management Follow Up    Length of Stay (days): 3    Expected Discharge Date: 06/17/2024     Concerns to be Addressed: discharge planning       Patient plan of care discussed at interdisciplinary rounds: Yes    Anticipated Discharge Disposition: Transitional Care     Anticipated Discharge Services:  TBD    Anticipated Discharge DME: None    Patient/family educated on Medicare website which has current facility and service quality ratings: yes    Education Provided on the Discharge Plan: Yes (AVS per bedside RN)    Patient/Family in Agreement with the Plan: yes    Referrals Placed by CM/SW:  post acute care         Additional Information:  CM reviewed chart. CM met with patient to discuss discharge plan. Patient agreeable to TCU but wanted to talk with family before she gives TCU choices. Patient is aware that referrals need to be sent today. 11:26 AM   The second time CM met with patient  two TCU choices were selected to send referrals. CM sent referrals to both facilities. 1:59 PM   Transoportation TBD.          Referrals pending  [facility] FREDI Sevilla RN  Care Coordinator

## 2024-06-17 NOTE — PROGRESS NOTES
"Orthopedic Progress Note      Assessment:    Right lower leg cellulitis    Plan:   - Continue PT/OT  - Weightbearing status: WBAT  - Anticoagulation: Warfarin   - Discharge planning: per medicine      Subjective:  Pain: 8/10  Chest pain, SOB: no  Nausea, Vomiting:  nauseous  Lightheadedness, Dizziness:  no  Neuro:  Patient denies new onset numbness or paresthesias    Patient states she is feeling better today. Feels like the redness is improving. Reports her foot still feels swollen and she is unable to flex at the ankle. Calf is soft.    Objective:  /64 (BP Location: Right arm, Patient Position: Semi-Vital's, Cuff Size: Adult Regular)   Pulse 75   Temp 98  F (36.7  C) (Oral)   Resp 18   Ht 1.651 m (5' 5\")   Wt 73.2 kg (161 lb 6.4 oz)   SpO2 93%   BMI 26.86 kg/m    The patient is A&Ox3. Appears comfortable.   Sensation is intact.  Dorsiflexion and plantar flexion is intact LLE  Pitting edema right foot  Dorsalis pedis pulse intact.  Calves are soft and non-tender. Negative Thomas's.  The incision is covered. Dressing C/D/I. Regression of erythema compared to prior outlined area on right anterior lower leg. Tenderness to palpation of the anterior shin. Right ankle nontender to palpation.     Pertinent Labs   Lab Results: personally reviewed.   Lab Results   Component Value Date    INR 1.93 (H) 06/17/2024    INR 2.87 (H) 06/16/2024    INR 4.02 (H) 06/15/2024     Lab Results   Component Value Date    WBC 13.9 (H) 06/15/2024    HGB 11.4 (L) 06/15/2024    HCT 35.2 06/15/2024    MCV 94 06/15/2024     06/15/2024     Lab Results   Component Value Date     06/16/2024    CO2 21 (L) 06/16/2024         Report completed by:  Marti Ontiveros PA-C/ [facility]    Date: 6/17/2024  Time: 9:30 AM   "

## 2024-06-17 NOTE — PROGRESS NOTES
"[facility]    Medicine Progress Note - Hospitalist Service    Date of Admission:  6/14/2024    Assessment & Plan   Francy Sherman is a 84 year old female admitted with cellulitis in the setting of recent cancer excision.  She is clinically stable.  She demonstrates ongoing improvement in passive ROM at the right foot.  Continue empiric vancomycin.    # Skin/soft tissue infection  - empiric vancomycin    # Urinary retention  - schilling catheter    # DM  - ISS    # Afib  - hold warfarin for now    # CKD 3b    Addendum:  Discussed with orthopaedics, they have no plans for operative intervention.  Therefore, continue with empiric IV vancomycin.          Diet: NPO per Anesthesia Guidelines for Procedure/Surgery Except for: Meds      Schilling Catheter: PRESENT, indication: Acute retention or obstruction  Lines: None     Cardiac Monitoring: None  Code Status: No CPR- Do NOT Intubate      Clinically Significant Risk Factors              # Hypoalbuminemia: Lowest albumin = 3.2 g/dL at 6/16/2024  8:56 AM, will monitor as appropriate  # Coagulation Defect: INR = 2.87 (Ref range: 0.85 - 1.15) and/or PTT = 64 Seconds (Ref range: 22 - 38 Seconds), will monitor for bleeding    # Hypertension: Noted on problem list             # DMII: A1C = 7.0 % (Ref range: <5.7 %) within past 6 months   # Overweight: Estimated body mass index is 26.86 kg/m  as calculated from the following:    Height as of this encounter: 1.651 m (5' 5\").    Weight as of this encounter: 73.2 kg (161 lb 6.4 oz)., PRESENT ON ADMISSION     # Financial/Environmental Concerns: none         Disposition Plan     Medically Ready for Discharge:              Manuel Lucas MD  Hospitalist Service  [facility]  Securely message with [employer] (more info)  Text page via [employer] Paging/Directory   ______________________________________________________________________    Interval History   Denies chest pain/pressure, dyspnea, or " abdominal pain.  Reports right shoulder popping, denies pain unless arm is being moved.    Physical Exam   Vital Signs: Temp: 98  F (36.7  C) Temp src: Oral BP: 123/66 Pulse: 78   Resp: 18 SpO2: 94 % O2 Device: Nasal cannula Oxygen Delivery: 1 LPM  Weight: 161 lbs 6.4 oz    Gen:  lying in bed in no extremis  Neuro:  alert, conversant  CV:  nl rate, regular rhythm  Pulm: No acute resp distress, ctab anteriorly  GI: abdomen NTTP  MSK:  right shoulder with tenderness at right lateral aspect of shoulder elicited only with external rotation at the right shoulder; right ankle with significantly improved passive ROM compared to admission; ongoing swelling at the right foot and right lower leg erythema; improvement in ability to move toes in right foot    Medical Decision Making             Data

## 2024-06-17 NOTE — CONSULTS
[facility] Nurse Inpatient Assessment     Consulted for: R shin    Summary: hx of skin cancer     Patient History (according to provider note(s):      Francy Sherman is a 84 year old female admitted with cellulitis in the setting of recent cancer excision.  She is clinically stable.  She demonstrates ongoing improvement in passive ROM at the right foot.  Continue empiric vancomycin.     # Skin/soft tissue infection  - empiric vancomycin     # Urinary retention  - schilling catheter     # DM  - ISS     # Afib  - hold warfarin for now     # CKD 3b     Addendum:  Discussed with orthopaedics, they have no plans for operative intervention.  Therefore, continue with empiric IV vancomycin.       Assessment:      Wound location: R shin      6/17    Last photo: 6/17  Wound due to: Surgical Wound  Wound history/plan of care: patient had skin cancer removed. Dried blood under steri strips, cannot see if the wound is dehiscing further. Will attempt to gently loosen steri strips and cleanse the wound more appropriately.   Wound base: 100 % non-granular tissue     Palpation of the wound bed: fluctuance      Drainage: small     Description of drainage: serosanguinous     Measurements (length x width x depth, in cm): 0.5  x 1.3  x  0.3 cm      Tunneling: N/A     Undermining: N/A  Periwound skin: Erythema- blanchable      Color: dusky and red      Temperature: warm  Odor: mild  Pain: moderate, throbbing  Pain interventions prior to dressing change: slow and gentle cares   Treatment goal: Drainage control and Infection control/prevention  STATUS: initial assessment  Supplies ordered: ordered honey       Treatment Plan:     R shin  Soak wound with vashe moistened gauze for 5 minutes, pat dry  Apply nickel thick layer of medihoney gel to wound  Cover with mepilex  Change dressing daily + PRN if soiled, saturated, falls off       Orders: Written    RECOMMEND PRIMARY TEAM ORDER: None, at this time  Education  provided: plan of care and wound progress  Discussed plan of care with: Patient and Nurse  [facility] nurse follow-up plan: weekly  Notify [facility] if wound(s) deteriorate.  Nursing to notify the Provider(s) and re-consult the [facility] Nurse if new skin concern.    DATA:     Current support surface: Standard  Standard Isoflex gel  Containment of urine/stool: Incontinence Protocol  BMI: Body mass index is 26.86 kg/m .   Active diet order: Orders Placed This Encounter      Consistent Carbohydrate Diet Moderate Consistent Carb (60 g CHO per Meal) Diet     Output: I/O last 3 completed shifts:  In: 120 [P.O.:120]  Out: 1700 [Urine:1700]     Labs:   Recent Labs   Lab 06/17/24  0746 06/16/24  0856 06/14/24  1315 06/14/24  0744   ALBUMIN  --  3.2*   < > 3.7   HGB 10.8*  --    < > 12.0   INR 1.93*  --    < > 4.15*   WBC 10.3  --    < > 17.0*   A1C  --   --   --  7.0*    < > = values in this interval not displayed.     Pressure injury risk assessment:   Sensory Perception: 3-->slightly limited  Moisture: 4-->rarely moist  Activity: 3-->walks occasionally  Mobility: 3-->slightly limited  Nutrition: 3-->adequate  Friction and Shear: 3-->no apparent problem  William Score: 19    BRAYAN FongN RN CWOCN  Pager no longer in use, please contact through [employer] group: [facility]

## 2024-06-17 NOTE — PLAN OF CARE
Goal Outcome Evaluation:  Problem: Adult Inpatient Plan of Care  Goal: Absence of Hospital-Acquired Illness or Injury  Intervention: Prevent Skin Injury  Problem: Adult Inpatient Plan of Care  Goal: Optimal Comfort and Wellbeing  Intervention: Monitor Pain and Promote Comfort  Patient vital signs are at baseline: Yes, on ra/  Patient able to ambulate as they were prior to admission or with assist devices provided by therapies during their stay:  Yes, with assist of 2.   Patient MUST void prior to discharge:  No,  Reason:  Villalobos catheter in place  Patient able to tolerate oral intake:  Yes  Pain has adequate pain control using Oral analgesics:  Yes  Does patient have an identified :  Yes  Has goal D/C date and time been discussed with patient:  Yes   A&Ox4. CMS intact. Rated pain severe; PRN PO Oxycodone administered. Dressing with marked scant drainage in RLE.  Pt advanced to 60g Carb diet. Pt tolerated fluid. Pt A2 and refused to get up. Villalobos catheter in place. Call light within reach and called appropriately.

## 2024-06-17 NOTE — PLAN OF CARE
Problem: Adult Inpatient Plan of Care  Goal: Optimal Comfort and Wellbeing  Outcome: Progressing     Problem: Risk for Delirium  Goal: Optimal Coping  Outcome: Progressing  Goal: Improved Sleep  Outcome: Progressing   Goal Outcome Evaluation:    Problem: Risk for Delirium  Goal: Improved Attention and Thought Clarity  Intervention: Maximize Cognitive Function  Recent Flowsheet Documentation  Taken 6/16/2024 2100 by Reyes, Dora, RN  Sensory Stimulation Regulation:   care clustered   lighting decreased  Reorientation Measures:   calendar in view   clock in view    Pt is A&O but can be forgetfull, no acute changes has been NPO since 00. VSS on 1L nc. Pain controlled by prn pain meds, oxycodone and robaxin administered. Erythema on RLE appears to be decreasing. Pt is A1w/walker and gb, remains WBAT.  Villalobos in place and patent. No distress noted w/assessment, respirations even and unlabored. Pt calls appropriately and makes needs known, call light within reach. Discharge pending clinical improvement as determined by providers.

## 2024-06-17 NOTE — DISCHARGE INSTRUCTIONS
[facility] DISCHARGE INSTRUCTIONS:  R shin  Soak wound with vashe moistened gauze for 5 minutes, pat dry  Apply nickel thick layer of medihoney gel to wound  Cover with mepilex  Change dressing every MWF + PRN if soiled, saturated, falls off

## 2024-06-18 ENCOUNTER — APPOINTMENT (OUTPATIENT)
Dept: OCCUPATIONAL THERAPY | Facility: CLINIC | Age: 84
DRG: 862 | End: 2024-06-18
Payer: COMMERCIAL

## 2024-06-18 ENCOUNTER — APPOINTMENT (OUTPATIENT)
Dept: RADIOLOGY | Facility: CLINIC | Age: 84
DRG: 862 | End: 2024-06-18
Attending: HOSPITALIST
Payer: COMMERCIAL

## 2024-06-18 ENCOUNTER — APPOINTMENT (OUTPATIENT)
Dept: PHYSICAL THERAPY | Facility: CLINIC | Age: 84
DRG: 862 | End: 2024-06-18
Payer: COMMERCIAL

## 2024-06-18 PROBLEM — L03.115 CELLULITIS OF RIGHT LEG: Status: ACTIVE | Noted: 2024-06-18

## 2024-06-18 LAB
CREAT SERPL-MCNC: 1.21 MG/DL (ref 0.51–0.95)
EGFRCR SERPLBLD CKD-EPI 2021: 44 ML/MIN/1.73M2
GLUCOSE BLDC GLUCOMTR-MCNC: 131 MG/DL (ref 70–99)
GLUCOSE BLDC GLUCOMTR-MCNC: 134 MG/DL (ref 70–99)
GLUCOSE BLDC GLUCOMTR-MCNC: 154 MG/DL (ref 70–99)
GLUCOSE BLDC GLUCOMTR-MCNC: 164 MG/DL (ref 70–99)
GLUCOSE BLDC GLUCOMTR-MCNC: 166 MG/DL (ref 70–99)
GLUCOSE BLDC GLUCOMTR-MCNC: 74 MG/DL (ref 70–99)
INR PPP: 1.83 (ref 0.85–1.15)
VANCOMYCIN SERPL-MCNC: 21.5 UG/ML

## 2024-06-18 PROCEDURE — 250N000011 HC RX IP 250 OP 636: Performed by: HOSPITALIST

## 2024-06-18 PROCEDURE — 85610 PROTHROMBIN TIME: CPT | Performed by: HOSPITALIST

## 2024-06-18 PROCEDURE — 97110 THERAPEUTIC EXERCISES: CPT | Mod: GP

## 2024-06-18 PROCEDURE — 71045 X-RAY EXAM CHEST 1 VIEW: CPT

## 2024-06-18 PROCEDURE — 97535 SELF CARE MNGMENT TRAINING: CPT | Mod: GO

## 2024-06-18 PROCEDURE — 82565 ASSAY OF CREATININE: CPT | Performed by: HOSPITALIST

## 2024-06-18 PROCEDURE — 250N000011 HC RX IP 250 OP 636: Mod: JZ | Performed by: INTERNAL MEDICINE

## 2024-06-18 PROCEDURE — 250N000013 HC RX MED GY IP 250 OP 250 PS 637: Performed by: HOSPITALIST

## 2024-06-18 PROCEDURE — 36415 COLL VENOUS BLD VENIPUNCTURE: CPT | Performed by: HOSPITALIST

## 2024-06-18 PROCEDURE — 120N000001 HC R&B MED SURG/OB

## 2024-06-18 PROCEDURE — 80202 ASSAY OF VANCOMYCIN: CPT | Performed by: HOSPITALIST

## 2024-06-18 PROCEDURE — 250N000013 HC RX MED GY IP 250 OP 250 PS 637: Performed by: INTERNAL MEDICINE

## 2024-06-18 PROCEDURE — 99222 1ST HOSP IP/OBS MODERATE 55: CPT | Performed by: INTERNAL MEDICINE

## 2024-06-18 PROCEDURE — 99232 SBSQ HOSP IP/OBS MODERATE 35: CPT | Performed by: HOSPITALIST

## 2024-06-18 RX ORDER — CEFTRIAXONE 2 G/1
2 INJECTION, POWDER, FOR SOLUTION INTRAMUSCULAR; INTRAVENOUS EVERY 24 HOURS
Status: DISCONTINUED | OUTPATIENT
Start: 2024-06-18 | End: 2024-06-22 | Stop reason: HOSPADM

## 2024-06-18 RX ORDER — WARFARIN SODIUM 7.5 MG/1
7.5 TABLET ORAL
Status: COMPLETED | OUTPATIENT
Start: 2024-06-18 | End: 2024-06-18

## 2024-06-18 RX ADMIN — METOPROLOL SUCCINATE 25 MG: 25 TABLET, EXTENDED RELEASE ORAL at 08:28

## 2024-06-18 RX ADMIN — ACETAMINOPHEN 650 MG: 325 TABLET ORAL at 20:23

## 2024-06-18 RX ADMIN — INSULIN GLARGINE 10 UNITS: 100 INJECTION, SOLUTION SUBCUTANEOUS at 21:26

## 2024-06-18 RX ADMIN — CEFTRIAXONE SODIUM 2 G: 2 INJECTION, POWDER, FOR SOLUTION INTRAMUSCULAR; INTRAVENOUS at 12:12

## 2024-06-18 RX ADMIN — AMLODIPINE BESYLATE 5 MG: 5 TABLET ORAL at 08:28

## 2024-06-18 RX ADMIN — OXYCODONE HYDROCHLORIDE 5 MG: 5 TABLET ORAL at 06:15

## 2024-06-18 RX ADMIN — OXYCODONE HYDROCHLORIDE 5 MG: 5 TABLET ORAL at 09:49

## 2024-06-18 RX ADMIN — VANCOMYCIN HYDROCHLORIDE 1000 MG: 1 INJECTION, SOLUTION INTRAVENOUS at 08:29

## 2024-06-18 RX ADMIN — METHOCARBAMOL 500 MG: 500 TABLET ORAL at 20:23

## 2024-06-18 RX ADMIN — PRAVASTATIN SODIUM 40 MG: 20 TABLET ORAL at 21:26

## 2024-06-18 RX ADMIN — OXYCODONE HYDROCHLORIDE 5 MG: 5 TABLET ORAL at 16:49

## 2024-06-18 RX ADMIN — WARFARIN SODIUM 7.5 MG: 7.5 TABLET ORAL at 18:00

## 2024-06-18 ASSESSMENT — ACTIVITIES OF DAILY LIVING (ADL)
ADLS_ACUITY_SCORE: 33
ADLS_ACUITY_SCORE: 34
ADLS_ACUITY_SCORE: 33
ADLS_ACUITY_SCORE: 34
ADLS_ACUITY_SCORE: 33
ADLS_ACUITY_SCORE: 31
ADLS_ACUITY_SCORE: 33
ADLS_ACUITY_SCORE: 33
ADLS_ACUITY_SCORE: 31
ADLS_ACUITY_SCORE: 33

## 2024-06-18 NOTE — CONSULTS
Consultation - INFECTIOUS DISEASE CONSULTATION  Francy Sherman ,  1940, MRN [id number]      Right leg pain [M79.604]  Sepsis, due to unspecified organism, unspecified whether acute organ dysfunction present (H) [A41.9]    PCP: Samy Suazo, [phone]   Code status:  No CPR- Do NOT Intubate               Assessment:  RLE cellulitis: following excision of cancer. Slow improvement. Leukocytosis resolved. Still significant pain although improved.   Comorbid conditions affecting immune system: DM, CKD    Active Problems:    Right leg pain    Sepsis, due to unspecified organism, unspecified whether acute organ dysfunction present (H)    Cellulitis of right leg        Recommendations:   - ceftriaxone  - Vanc  - trend crp, sed in am  - needs to elevate legs above heart, recommend 4-5 pillows  - further recommendations to follow clinical course  - ID will follow, thanks    Debi Light MD  [facility]  Office Telephone [phone].  Fax [phone]  [employer] paging      HPI:    Francy Sherman is a 84 year old female. History is provided by patient and chart.  Had bx right shin lesion. Shortly after developed redness, pain, swelling, fevers and chills so came to ED. Leukocytosis, fevers. On vanc. Still can't put weight on foot but thinks a bit better in that calf is less tender and can wiggle toes.     Chief complaint: Active Problems:    Right leg pain    Sepsis, due to unspecified organism, unspecified whether acute organ dysfunction present (H)    Cellulitis of right leg      Medical History  Active Ambulatory Problems     Diagnosis Date Noted    Hyperkalemia 2019    ATN (acute tubular necrosis) (H24) 2019    Rapid atrial fibrillation (H) 2019    Acute renal failure (H24) 2019    HTN (hypertension) 2019    Hyperlipidemia 2019    DMII (diabetes mellitus, type 2) (H) 2019     Resolved Ambulatory Problems     Diagnosis Date Noted    No  "Resolved Ambulatory Problems     No Additional Past Medical History         Surgical History  She  has a past surgical history that includes Cholecystectomy.       Social History  Reviewed, and she  reports that she has quit smoking. She has never used smokeless tobacco. She reports that she does not drink alcohol and does not use drugs.        Family History  Reviewed and noncontributory to present problem, and family history is not on file.    Psychosocial Needs  Social History     Social History Narrative    Not on file     Additional psychosocial needs reviewed per nursing assessment.       Allergies   Allergen Reactions    Macrobid [Nitrofurantoin] Rash      Medications Prior to Admission   Medication Sig Dispense Refill Last Dose    acetaminophen (TYLENOL) 325 MG tablet Take 650 mg by mouth every morning   6/13/2024    amLODIPine (NORVASC) 5 MG tablet [AMLODIPINE (NORVASC) 5 MG TABLET] Take 5 mg by mouth daily.   6/13/2024    cephALEXin (KEFLEX) 500 MG capsule Take 500 mg by mouth 2 times daily Start 7 day course 6/13/24 6/13/2024    glipiZIDE (GLUCOTROL) 5 MG tablet Take 5 mg by mouth 2 times daily (before meals)   6/13/2024    LANTUS SOLOSTAR U-100 INSULIN 100 unit/mL (3 mL) pen [LANTUS SOLOSTAR U-100 INSULIN 100 UNIT/ML (3 ML) PEN] Inject 10 Units under the skin at bedtime. 11.65 Type 2 with hyperglycemia  Contact provider if insulin prescribed is not the preferred insulin per insurance. (Patient taking differently: Inject Subcutaneous at bedtime Recent available clinic records suggest 16 units at bedtime.  Patient says she takes between \"50 and 250 units daily\" asked multiple times to clarify) 5 pen PRN 6/13/2024    metoprolol succinate (TOPROL-XL) 25 MG [METOPROLOL SUCCINATE (TOPROL-XL) 25 MG] Take 1 tablet (25 mg total) by mouth daily. 30 tablet 0 6/13/2024    pravastatin (PRAVACHOL) 40 MG tablet Take 40 mg by mouth at bedtime   6/13/2024    warfarin ANTICOAGULANT (COUMADIN) 5 MG tablet Take 5-7.5 mg " by mouth See Admin Instructions Take 7.5mg Mon/Wed/Fri and take 5mg rest of week.           Review of Systems:  A 12 point comprehensive review of systems was negative except as noted. Physical Exam:  Temp:  [98.4  F (36.9  C)-98.6  F (37  C)] 98.4  F (36.9  C)  Pulse:  [67-77] 67  Resp:  [16-18] 16  BP: (120-124)/(55-60) 124/60  SpO2:  [87 %-94 %] 94 %    GEN: alert and oriented x3, NAD  HEAD: atraumatic  ENT: moist membranes, no thrush, anicteric sclera  NECK: supple, no nuchal rigidity  CARDIOVASCULAR: regular rate and rhythm, no murmurs, rubs, or gallops  PULMONARY: lungs clear to ausculation bilaterally. Supplemental O2  ABDOMEN: soft, nontender, nondistended. Normal bowel sounds  SKIN: shin as below. Swelling and erythema slight improvement with elevation.  PSYCH: grossly intact  MUSCULOSKELETAL: no synovitis               Pertinent Labs  personally reviewed.   CBC RESULTS:   Recent Labs   Lab Test 06/17/24  0746   WBC 10.3   RBC 3.52*   HGB 10.8*   HCT 33.5*   MCV 95   MCH 30.7   MCHC 32.2   RDW 12.6           Last Comprehensive Metabolic Panel:  Sodium   Date Value Ref Range Status   06/16/2024 138 135 - 145 mmol/L Final     Comment:     Reference intervals for this test were updated on 09/26/2023 to more accurately reflect our healthy population. There may be differences in the flagging of prior results with similar values performed with this method. Interpretation of those prior results can be made in the context of the updated reference intervals.      Potassium   Date Value Ref Range Status   06/16/2024 4.5 3.4 - 5.3 mmol/L Final   04/18/2022 4.7 3.5 - 5.0 mmol/L Final     Chloride   Date Value Ref Range Status   06/16/2024 105 98 - 107 mmol/L Final   04/18/2022 107 98 - 107 mmol/L Final     Carbon Dioxide (CO2)   Date Value Ref Range Status   06/16/2024 21 (L) 22 - 29 mmol/L Final   04/18/2022 22 22 - 31 mmol/L Final     Anion Gap   Date Value Ref Range Status   06/16/2024 12 7 - 15 mmol/L Final  "  04/18/2022 12 5 - 18 mmol/L Final     Glucose   Date Value Ref Range Status   04/18/2022 142 (H) 70 - 125 mg/dL Final     GLUCOSE BY METER POCT   Date Value Ref Range Status   06/18/2024 134 (H) 70 - 99 mg/dL Final     Urea Nitrogen   Date Value Ref Range Status   06/16/2024 33.7 (H) 8.0 - 23.0 mg/dL Final   04/18/2022 43 (H) 8 - 28 mg/dL Final     Creatinine   Date Value Ref Range Status   06/18/2024 1.21 (H) 0.51 - 0.95 mg/dL Final     GFR Estimate   Date Value Ref Range Status   06/18/2024 44 (L) >60 mL/min/1.73m2 Final     Comment:     eGFR calculated using 2021 CKD-EPI equation.   09/24/2019 9 (L) >60 mL/min/1.73m2 Final     Calcium   Date Value Ref Range Status   06/16/2024 8.9 8.8 - 10.2 mg/dL Final       No results found for: \"CRP\"     The following microbiology studies were personally reviewed:  No results found for: \"CULT\"    Urine Studies    Recent Labs   Lab Test 06/14/24  1433 09/20/19  0240   LEUKEST Negative Negative   WBCU <1 0-5       Vancomycin Levels  No lab results found.    Invalid input(s): \"VANCO\"    MICROBIOLOGY DATA:    All cultures:  7-Day Micro Results       Collected Updated Procedure Result Status      06/14/2024 0744 06/17/2024 1206 Blood Culture Peripheral Blood [[id number]]   Peripheral Blood    Preliminary result Component Value   Culture No growth after 3 days  [P]                         Pertinent Radiology  personally reviewed.     XR Chest Port 1 View    Result Date: 6/18/2024  EXAM: XR CHEST PORT 1 VIEW LOCATION: [facility] DATE: 6/18/2024 INDICATION: hypoxia COMPARISON: 4/18/2022     IMPRESSION: Some stable slight fibrosis left lung base. No acute new findings. Degenerative change right shoulder.    XR Shoulder Right G/E 3 Views    Result Date: 6/17/2024  EXAM: XR SHOULDER RIGHT G/E 3 VIEWS LOCATION: [facility] DATE: 6/17/2024 INDICATION: right shoulder pain elicited with external rotation COMPARISON: None.     IMPRESSION: " No acute fracture or malalignment. Severe end-stage glenohumeral joint degenerative changes with bone-on-bone articulation, bony remodeling, and a large inferior humeral head osteophyte. There is a 1.2 cm intra-articular body superiorly. Moderate acromioclavicular joint degenerative changes. Osteopenia. Chronic lung changes.    MR Ankle Right w/o Contrast    Result Date: 6/15/2024  EXAM: MR ANKLE RIGHT W/O CONTRAST LOCATION: [facility] DATE: 6/15/2024 INDICATION: Swelling and limited ROM. COMPARISON: Radiographs from 6/14/2024. TECHNIQUE: Unenhanced. FINDINGS: TENDONS: -Peroneal: Peroneus longus and brevis tendons are intact. No tendinopathy or tenosynovitis. No subluxation. -Medial: Posterior tibialis tendon is intact. No tendinopathy or tenosynovitis. Flexor digitorum longus and flexor hallucis longus tendons are normal. No tenosynovitis. -Anterior: Anterior tibialis, extensor hallucis longus, and extensor digitorum longus tendons are normal. No tenosynovitis. -Achilles: No tendinopathy or paratenonitis. LIGAMENTS: -Anterior talofibular ligament: High-grade tear of the anterior talofibular ligament, but no adjacent edema, indicating that this is chronic. -Calcaneofibular ligament: Normal. -Posterior talofibular ligament: Intact. -Syndesmotic inferior tibiofibular ligaments: Intact. -Deltoid ligament complex: Negative. -Spring ligament complex: Negative. JOINTS AND BONES: -No fracture, bone contusion or osteochondral lesion. Normal articular cartilage. No joint effusion or synovitis. SOFT TISSUES: -Plantar fascia: Intact. No acute fasciitis or tear. -Sinus tarsi and tarsal tunnel: Normal. -Muscles: No muscle edema or atrophy. Superficial soft tissue edema diffusely. No hematoma, cyst or mass. No focal fluid collection. No evidence of abscess or soft tissue gas.     IMPRESSION: 1.  Superficial soft tissue edema diffusely, nonspecific, but this may be related to cellulitis. No evidence of  abscess or soft tissue gas. 2.  High-grade tear of the anterior talofibular ligament, but no adjacent edema, indicating that this is chronic. 3.  No other abnormality.     MR Tibia Fibula Lower Leg Right wo Contrast    Result Date: 6/15/2024  EXAM: MR TIBIA FIBULA LOWER LEG RIGHT W/O CONTRAST LOCATION: [facility] DATE: 6/15/2024 INDICATION: Swelling and limited ROM. COMPARISON: Radiographs from 6/14/2024. TECHNIQUE: Unenhanced. FINDINGS: BONES: -No fracture, bone contusion, or stress reaction. No concerning marrow replacing lesion. No evidence of osteomyelitis. SOFT TISSUES:  -There is an open wound anteromedial to the mid shaft of the tibia. There is a small underlying fluid collection just superficial to the tibialis anterior muscle belly in this area. It is 5 mm in depth by 13 mm transverse by 22 mm cephalocaudal. There is  superficial soft tissue edema diffusely. There is also anterolateral compartment, lateral compartment and tibialis posterior muscle edema diffusely.     IMPRESSION: 1.  Open wound anteromedial to the mid shaft of the tibia. There is a small underlying fluid collection which may be an abscess. 2.  Diffuse superficial soft tissue edema. 3.  Diffuse muscle edema which may be due to myositis. 4.  No evidence of osteomyelitis.     XR Ankle Right G/E 3 Views    Result Date: 6/14/2024  EXAM: XR ANKLE RIGHT G/E 3 VIEWS, XR FOOT RIGHT 2 VIEWS LOCATION: [facility] DATE: 6/14/2024 INDICATION: Limited ROM at right ankle, cellulitis, ?effusion or evidence of infection. COMPARISON: None.     IMPRESSION: Normal joint spaces and alignment. There is no evidence of an acute displaced right foot or ankle fracture. Ankle mortise is intact. Soft tissue swelling about the foot and ankle. Bones are demineralized.          XR Foot Right 2 Views    Result Date: 6/14/2024  EXAM: XR ANKLE RIGHT G/E 3 VIEWS, XR FOOT RIGHT 2 VIEWS LOCATION: [facility] DATE: 6/14/2024 INDICATION: Limited ROM at right ankle, cellulitis, ?effusion or evidence of infection. COMPARISON: None.     IMPRESSION: Normal joint spaces and alignment. There is no evidence of an acute displaced right foot or ankle fracture. Ankle mortise is intact. Soft tissue swelling about the foot and ankle. Bones are demineralized.          XR Tibia and Fibula Right 2 Views    Result Date: 6/14/2024  EXAM: XR TIBIA AND FIBULA RIGHT 2 VIEWS LOCATION: [facility] DATE: 06/14/2024 INDICATION: Infection. Gas? COMPARISON: None.     IMPRESSION: The right tibia and fibula are intact without evidence of a fracture. There is no cortical destructive change to suggest osteomyelitis. Soft tissue swelling. No soft tissue gas identified. Degenerative changes right knee. Chondrocalcinosis. Atherosclerotic vascular calcifications.

## 2024-06-18 NOTE — PLAN OF CARE
Goal Outcome Evaluation:    Pt A/OX4. VSS. On supplemental oxygen at bedtime. Meds taken whole without difficulty. Reported pain in RLE, admin PRN oxycodone 5mg x2 with this shift with some relief . Able to communicate needs to staff. AST x1 with gait belt and walker. Peripheral IV saline locked, flushed cap changed. Villalobos catheter in place, draining yellow urine, chronic urinary retention. Cath care done. Dressing remained CDI. Bed in low position. Call light within reach.     Problem: Adult Inpatient Plan of Care  Goal: Plan of Care Review  Description: The Plan of Care Review/Shift note should be completed every shift.  The Outcome Evaluation is a brief statement about your assessment that the patient is improving, declining, or no change.  This information will be displayed automatically on your shift  note.  Outcome: Progressing    Problem: Urinary Retention  Goal: Effective Urinary Elimination  Outcome: Progressing

## 2024-06-18 NOTE — PROGRESS NOTES
"[facility]    Medicine Progress Note - Hospitalist Service    Date of Admission:  6/14/2024    Assessment & Plan   Francy Sherman is a 84 year old female admitted with cellulitis in the setting of recent cancer excision.  She is clinically stable.  Sluggish clinical response to empiric abx with ongoing notable swelling and tenderness with passive ROM at right ankle.  Lower concern for concomitant DVT given therapeutic anticoagulation for afib.  Consult ID for further evaluation.  Mild persistent hypoxia, may be related to atelectasis given limited mobility with RLE cellulitis.  Further evaluation with CXR.    # Skin/soft tissue infection  - empiric vancomycin    # Urinary retention  - schilling catheter, consider inpatient vs outpatient void trial    # Hypoxia, suspect atelectasis  - home oxygen assessment  - incentive spirometry    # Right glenohumeral degenerative joint disease  - outpatient orthopaedics followup    # DM  - ISS    # Afib  - warfarin    # CKD 3b          Diet: Consistent Carbohydrate Diet Moderate Consistent Carb (60 g CHO per Meal) Diet      Schilling Catheter: PRESENT, indication: Acute retention or obstruction  Lines: None     Cardiac Monitoring: None  Code Status: No CPR- Do NOT Intubate      Clinically Significant Risk Factors              # Hypoalbuminemia: Lowest albumin = 3.2 g/dL at 6/16/2024  8:56 AM, will monitor as appropriate     # Hypertension: Noted on problem list             # DMII: A1C = 7.0 % (Ref range: <5.7 %) within past 6 months   # Overweight: Estimated body mass index is 26.86 kg/m  as calculated from the following:    Height as of this encounter: 1.651 m (5' 5\").    Weight as of this encounter: 73.2 kg (161 lb 6.4 oz)., PRESENT ON ADMISSION     # Financial/Environmental Concerns: none         Disposition Plan     Medically Ready for Discharge:              Manuel Lucas MD  Hospitalist Service  [facility]  Securely message with " Gus (more info)  Text page via [facility] Paging/Directory   ______________________________________________________________________    Interval History   Denies chest pain/pressure, dyspnea, or abdominal pain.  Reports lower back pain that is present when lying down.    Physical Exam   Vital Signs: Temp: 98.4  F (36.9  C) Temp src: Oral BP: 124/60 Pulse: 67   Resp: 16 SpO2: 94 % O2 Device: Nasal cannula Oxygen Delivery: 2 LPM  Weight: 161 lbs 6.4 oz    Gen:  lying in bed in no extremis  Neuro: alert, conversant  CV:  nl rate, regular rhythm  Pulm: no acute resp distress, ctab anteriorly  GI:  abdomen NTTP  MSK:  right foot with notable swelling; tenderness to passive dorsi/plantarflexion at the right ankle; right anterior lower leg wound with surrounding erythema; no tenderness to palpation in the left lower lumbar paraspinous region where patient reported pain    Medical Decision Making             Data   Reviewed:    INR 1.8    XR R shoulder  IMPRESSION: No acute fracture or malalignment. Severe end-stage glenohumeral joint degenerative changes with bone-on-bone articulation, bony remodeling, and a large inferior humeral head osteophyte. There is a 1.2 cm intra-articular body superiorly.   Moderate acromioclavicular joint degenerative changes. Osteopenia. Chronic lung changes.

## 2024-06-18 NOTE — PLAN OF CARE
Goal Outcome Evaluation:  Problem: Adult Inpatient Plan of Care  Goal: Absence of Hospital-Acquired Illness or Injury  Intervention: Prevent Skin Injury  Problem: Adult Inpatient Plan of Care  Goal: Optimal Comfort and Wellbeing  Intervention: Monitor Pain and Promote Comfort  Patient vital signs are at baseline: {Yes/No, Reason Required:697983}  Patient able to ambulate as they were prior to admission or with assist devices provided by therapies during their stay:  {Yes/No, Reason Required:119273}  Patient MUST void prior to discharge:  {Yes/No, Reason Required:509174}  Patient able to tolerate oral intake:  {Yes/No, Reason Required:261130}  Pain has adequate pain control using Oral analgesics:  {Yes/No, Reason Required:128322}  Does patient have an identified :  {Yes/No, Reason:958603}  Has goal D/C date and time been discussed with patient:  {Yes/No, Reason:789161}

## 2024-06-18 NOTE — PROGRESS NOTES
Care Management Follow Up    Length of Stay (days): 4    Expected Discharge Date: 06/18/2024     Concerns to be Addressed: discharge planning       Patient plan of care discussed at interdisciplinary rounds: Yes    Anticipated Discharge Disposition: Transitional Care     Anticipated Discharge Services:  TBD    Anticipated Discharge DME: None    Patient/family educated on Medicare website which has current facility and service quality ratings: yes    Education Provided on the Discharge Plan: Yes (AVS per bedside RN)    Patient/Family in Agreement with the Plan: yes    Referrals Placed by CM/SW:  TCU         Additional Information:  CM reviewed chart. CM met with patient and she is agreeable to TCU. She would prefer to go home but states she can't manage. Agreeable to a referral being sent to [facility]. 11:11 AM   Transportation TBD (likely agency). CM will follow.       Referral pending  [facility]         Katherine Sevilla, RN  Care Coordinator

## 2024-06-18 NOTE — PROGRESS NOTES
"Orthopedic Progress Note      Assessment:    Right lower leg cellulitis     Plan:   - Continue PT/OT  - Weightbearing status: WBAT  - Anticoagulation: Warfarin   - wound treated per WOC nurse  - Discharge planning: per medicine        Subjective:  Pain: controlled on current medication  Chest pain, SOB: no  Nausea, Vomiting:  nauseous  Lightheadedness, Dizziness:  no  Neuro:  Patient denies new onset numbness or paresthesias     Patient states she is doing well. She has ongoing concern for swelling and redness in her foot; feels like it is not improving. Reports that she has been able to wiggle her toes, but is still unable to put weight through foot due to pain. SW discussed  TCU at discharge, but patient states that ultimately she would prefer to go home.      Objective:  /60 (BP Location: Left arm)   Pulse 67   Temp 98.4  F (36.9  C) (Oral)   Resp 16   Ht 1.651 m (5' 5\")   Wt 73.2 kg (161 lb 6.4 oz)   SpO2 94%   BMI 26.86 kg/m      The patient is A&Ox3. Appears comfortable.   Sensation is intact.  Unable to dorsiflex RLE, able to slightly wiggle toes R foot  Dorsiflexion and plantar flexion is intact LLE  Pitting edema right foot  Dorsalis pedis pulse intact.  Calves are soft and non-tender. Negative Thomas's.  The incision is covered. Dressing C/D/I. Regression of erythema compared to prior outlined area on right anterior lower leg. Tenderness to palpation of the anterior shin. Right ankle nontender to palpation.     Pertinent Labs   Lab Results: personally reviewed.   Lab Results   Component Value Date    INR 1.83 (H) 06/18/2024    INR 1.93 (H) 06/17/2024    INR 2.87 (H) 06/16/2024     Lab Results   Component Value Date    WBC 10.3 06/17/2024    HGB 10.8 (L) 06/17/2024    HCT 33.5 (L) 06/17/2024    MCV 95 06/17/2024     06/17/2024     Lab Results   Component Value Date     06/16/2024    CO2 21 (L) 06/16/2024         Report completed by:  Marti Ontiveros PA-C/Dr. Mannie Underwood " Orthopedics    Date: 6/18/2024  Time: 9:43 AM

## 2024-06-18 NOTE — PLAN OF CARE
Goal Outcome Evaluation:  Problem: Adult Inpatient Plan of Care  Goal: Absence of Hospital-Acquired Illness or Injury  Intervention: Identify and Manage Fall Risk  Problem: Risk for Delirium  Goal: Improved Attention and Thought Clarity  Intervention: Maximize Cognitive Function  Problem: Adult Inpatient Plan of Care  Goal: Optimal Comfort and Wellbeing  Intervention: Monitor Pain and Promote Comfort  Intervention: Prevent Skin Injury  Intervention: Prevent and Manage VTE (Venous Thromboembolism) Risk  Intervention: Prevent Infection  Patient vital signs are at baseline: Yes, on 1-2L NC  Patient able to ambulate as they were prior to admission or with assist devices provided by therapies during their stay:  No,  Reason:  Only pivot to bedside commode. PT too painful to put wait on right foot.  Patient MUST void prior to discharge:  No,  Reason:  Villalobos catheter in place due to urinary retention.  Patient able to tolerate oral intake:  Yes  Pain has adequate pain control using Oral analgesics:  Yes  Does patient have an identified :  Yes  Has goal D/C date and time been discussed with patient:  Yes   A&Ox4. Pt C/O numbness in the back of right late this afternoon, otherwise CMS intact. Elevated leg with pillows. Rated severe pain; PRN PO Oxycodone administered with relief. Dressing changed. Pt tolerated regular diet. Call light within reach. Bed alarm activated for safety.

## 2024-06-19 ENCOUNTER — APPOINTMENT (OUTPATIENT)
Dept: OCCUPATIONAL THERAPY | Facility: CLINIC | Age: 84
DRG: 862 | End: 2024-06-19
Payer: COMMERCIAL

## 2024-06-19 PROBLEM — I48.20 CHRONIC ATRIAL FIBRILLATION (H): Status: ACTIVE | Noted: 2024-06-19

## 2024-06-19 PROBLEM — L03.115 CELLULITIS OF RIGHT LEG: Chronic | Status: ACTIVE | Noted: 2024-06-18

## 2024-06-19 PROBLEM — C44.90 SKIN CANCER: Status: ACTIVE | Noted: 2024-06-19

## 2024-06-19 PROBLEM — M79.604 RIGHT LEG PAIN: Chronic | Status: ACTIVE | Noted: 2024-06-14

## 2024-06-19 PROBLEM — E11.9 DMII (DIABETES MELLITUS, TYPE 2) (H): Chronic | Status: ACTIVE | Noted: 2019-09-21

## 2024-06-19 PROBLEM — Z79.01 WARFARIN ANTICOAGULATION: Status: ACTIVE | Noted: 2024-06-19

## 2024-06-19 PROBLEM — C44.90 SKIN CANCER: Chronic | Status: ACTIVE | Noted: 2024-06-19

## 2024-06-19 PROBLEM — Z79.01 WARFARIN ANTICOAGULATION: Chronic | Status: ACTIVE | Noted: 2024-06-19

## 2024-06-19 PROBLEM — Z66 DNR (DO NOT RESUSCITATE): Status: ACTIVE | Noted: 2024-06-19

## 2024-06-19 PROBLEM — A41.9 SEPSIS, DUE TO UNSPECIFIED ORGANISM, UNSPECIFIED WHETHER ACUTE ORGAN DYSFUNCTION PRESENT (H): Chronic | Status: ACTIVE | Noted: 2024-06-14

## 2024-06-19 LAB
BACTERIA BLD CULT: NO GROWTH
CREAT SERPL-MCNC: 1.23 MG/DL (ref 0.51–0.95)
CRP SERPL-MCNC: 92.9 MG/L
EGFRCR SERPLBLD CKD-EPI 2021: 43 ML/MIN/1.73M2
ERYTHROCYTE [SEDIMENTATION RATE] IN BLOOD BY WESTERGREN METHOD: 66 MM/HR (ref 0–30)
GLUCOSE BLDC GLUCOMTR-MCNC: 120 MG/DL (ref 70–99)
GLUCOSE BLDC GLUCOMTR-MCNC: 141 MG/DL (ref 70–99)
GLUCOSE BLDC GLUCOMTR-MCNC: 148 MG/DL (ref 70–99)
GLUCOSE BLDC GLUCOMTR-MCNC: 208 MG/DL (ref 70–99)
GLUCOSE BLDC GLUCOMTR-MCNC: 240 MG/DL (ref 70–99)
INR PPP: 2.28 (ref 0.85–1.15)

## 2024-06-19 PROCEDURE — 250N000011 HC RX IP 250 OP 636: Mod: JZ | Performed by: INTERNAL MEDICINE

## 2024-06-19 PROCEDURE — 36415 COLL VENOUS BLD VENIPUNCTURE: CPT | Performed by: INTERNAL MEDICINE

## 2024-06-19 PROCEDURE — 250N000013 HC RX MED GY IP 250 OP 250 PS 637: Performed by: HOSPITALIST

## 2024-06-19 PROCEDURE — 86140 C-REACTIVE PROTEIN: CPT | Performed by: INTERNAL MEDICINE

## 2024-06-19 PROCEDURE — 87205 SMEAR GRAM STAIN: CPT | Performed by: INTERNAL MEDICINE

## 2024-06-19 PROCEDURE — 99232 SBSQ HOSP IP/OBS MODERATE 35: CPT | Performed by: INTERNAL MEDICINE

## 2024-06-19 PROCEDURE — 97535 SELF CARE MNGMENT TRAINING: CPT | Mod: GO

## 2024-06-19 PROCEDURE — 85652 RBC SED RATE AUTOMATED: CPT | Performed by: INTERNAL MEDICINE

## 2024-06-19 PROCEDURE — 85610 PROTHROMBIN TIME: CPT | Performed by: HOSPITALIST

## 2024-06-19 PROCEDURE — 250N000011 HC RX IP 250 OP 636: Performed by: HOSPITALIST

## 2024-06-19 PROCEDURE — 120N000001 HC R&B MED SURG/OB

## 2024-06-19 PROCEDURE — 250N000013 HC RX MED GY IP 250 OP 250 PS 637: Performed by: INTERNAL MEDICINE

## 2024-06-19 PROCEDURE — 82565 ASSAY OF CREATININE: CPT | Performed by: HOSPITALIST

## 2024-06-19 RX ORDER — WARFARIN SODIUM 5 MG/1
5 TABLET ORAL
Status: COMPLETED | OUTPATIENT
Start: 2024-06-19 | End: 2024-06-19

## 2024-06-19 RX ADMIN — VANCOMYCIN HYDROCHLORIDE 1000 MG: 1 INJECTION, SOLUTION INTRAVENOUS at 09:27

## 2024-06-19 RX ADMIN — OXYCODONE HYDROCHLORIDE 5 MG: 5 TABLET ORAL at 09:26

## 2024-06-19 RX ADMIN — CEFTRIAXONE SODIUM 2 G: 2 INJECTION, POWDER, FOR SOLUTION INTRAMUSCULAR; INTRAVENOUS at 13:02

## 2024-06-19 RX ADMIN — AMLODIPINE BESYLATE 5 MG: 5 TABLET ORAL at 09:26

## 2024-06-19 RX ADMIN — OXYCODONE HYDROCHLORIDE 5 MG: 5 TABLET ORAL at 13:31

## 2024-06-19 RX ADMIN — OXYCODONE HYDROCHLORIDE 5 MG: 5 TABLET ORAL at 04:48

## 2024-06-19 RX ADMIN — PRAVASTATIN SODIUM 40 MG: 20 TABLET ORAL at 21:43

## 2024-06-19 RX ADMIN — ACETAMINOPHEN 650 MG: 325 TABLET ORAL at 23:11

## 2024-06-19 RX ADMIN — WARFARIN SODIUM 5 MG: 5 TABLET ORAL at 17:52

## 2024-06-19 RX ADMIN — INSULIN GLARGINE 10 UNITS: 100 INJECTION, SOLUTION SUBCUTANEOUS at 21:45

## 2024-06-19 RX ADMIN — METHOCARBAMOL 500 MG: 500 TABLET ORAL at 13:02

## 2024-06-19 RX ADMIN — METOPROLOL SUCCINATE 25 MG: 25 TABLET, EXTENDED RELEASE ORAL at 09:26

## 2024-06-19 RX ADMIN — OXYCODONE HYDROCHLORIDE 5 MG: 5 TABLET ORAL at 23:11

## 2024-06-19 ASSESSMENT — ACTIVITIES OF DAILY LIVING (ADL)
ADLS_ACUITY_SCORE: 33
ADLS_ACUITY_SCORE: 34
ADLS_ACUITY_SCORE: 34
ADLS_ACUITY_SCORE: 33

## 2024-06-19 NOTE — PROGRESS NOTES
SPIRITUAL HEALTH SERVICES Progress Note    Saw pt Francy Sherman and offered [employer] sacrament of anointing for the healing of the sick.     Fr. Brian Kebede

## 2024-06-19 NOTE — PLAN OF CARE
Problem: Skin or Soft Tissue Infection  Goal: Absence of Infection Signs and Symptoms  Outcome: Progressing  Intervention: Minimize and Manage Infection Progression    Patient a/o x4, VSS with exception of requiring 1L of O2 NC. IV leaking at site, removed and called SWAT to assist in placing a new IV.  Not OOB during the night, but shifts weight well.  Villalobos intact with good yellow output.  ACE wrap dressing CDI and CMS intact. Pain was controlled with PRN Oxycodone, scheduled medications, repositioning, and rest.  Denied chest pain, SOB, lightheadedness, dizziness, or N/V.  Bed alarm on for safety precautions. Utilizes call light appropriately.

## 2024-06-19 NOTE — PLAN OF CARE
Problem: Urinary Retention  Goal: Effective Urinary Elimination  Outcome: Progressing     Problem: Pain Acute  Goal: Optimal Pain Control and Function  Outcome: Progressing  Intervention: Prevent or Manage Pain  Recent Flowsheet Documentation  Taken 6/19/2024 1605 by Natalie Do RN  Medication Review/Management: medications reviewed     Goal Outcome Evaluation: Pt reports pain to RLE isn't as painful as it has been. PRN Robaxin and Oxycodone effective. Dressing changed per orders. Villalobos remains in place. Poor appetite; only had glucerna for dinner.

## 2024-06-19 NOTE — PROGRESS NOTES
[facility]    Medicine Progress Note - Hospitalist Service    Date of Admission:  6/14/2024    Assessment & Plan   Principal Problem:    Cellulitis of right leg    Date Noted: 6/18/2024  Active Problems:    HTN (hypertension)    Date Noted: 9/21/2019    DMII (diabetes mellitus, type 2) (H)    Date Noted: 9/21/2019    Sepsis, due to unspecified organism, unspecified whether acute organ dysfunction present (H)    Date Noted: 6/14/2024    Chronic atrial fibrillation (H)    Date Noted: 6/19/2024    Warfarin anticoagulation    Date Noted: 6/19/2024    DNR (do not resuscitate)    Date Noted: 6/19/2024    Skin cancer    Date Noted: 6/19/2024           Francy Sherman is a 84 year old female admitted with cellulitis in the setting of recent cancer excision.  She is clinically stable.  Sluggish clinical response to empiric abx with ongoing notable swelling and tenderness with passive ROM at right ankle.  Lower concern for concomitant DVT given therapeutic anticoagulation for afib.  Consult ID for further evaluation.  Mild persistent hypoxia, may be related to atelectasis given limited mobility with RLE cellulitis.  Further evaluation with CXR.     ID NOTE  RLE cellulitis: following excision of cancer. Slow improvement. Leukocytosis resolved. Still significant pain although improved. Purulent drainage on exam 6/19, culture sent.   Comorbid conditions affecting immune system: DM, CKD     PLAN:  - ceftri  - vanc  - wound culture collected today  - needs to elevate legs above heart, recommend 4-5 pillows  - further recommendations to follow clinical course  - ID will follow, thanks         Diet: Consistent Carbohydrate Diet Moderate Consistent Carb (60 g CHO per Meal) Diet  Snacks/Supplements Adult: Glucerna; With Meals    DVT Prophylaxis: Pneumatic Compression Devices  Villalobos Catheter: PRESENT, indication: Acute retention or obstruction  Lines: None     Cardiac Monitoring: None  Code Status: No CPR- Do  "NOT Intubate      Clinically Significant Risk Factors              # Hypoalbuminemia: Lowest albumin = 3.2 g/dL at 6/16/2024  8:56 AM, will monitor as appropriate     # Hypertension: Noted on problem list             # DMII: A1C = 7.0 % (Ref range: <5.7 %) within past 6 months   # Overweight: Estimated body mass index is 26.86 kg/m  as calculated from the following:    Height as of this encounter: 1.651 m (5' 5\").    Weight as of this encounter: 73.2 kg (161 lb 6.4 oz).      # Financial/Environmental Concerns: none         Disposition Plan     Medically Ready for Discharge: Anticipated in 2-4 Days             Zana Wong MD  Hospitalist Service  [facility]  Securely message with [employer] (more info)  Text page via [employer] Paging/Directory   ______________________________________________________________________    Interval History   She is comfortable making progress less pain less swelling she is not exactly sure what type of skin cancer she has but she does not think it was melanoma or anything more serious    Physical Exam   Vital Signs: Temp: 98  F (36.7  C) Temp src: Oral BP: 136/80 Pulse: 77   Resp: 16 SpO2: 91 % O2 Device: Nasal cannula Oxygen Delivery: 2 LPM  Weight: 161 lbs 6.4 oz    ----------------------------------------------------------------------------------------  Medical Decision Making       25 MINUTES SPENT BY ME on the date of service doing chart review, history, exam, documentation & further activities per the note.          Data   Recent Results (from the past 120 hour(s))   Glucose by meter    Collection Time: 06/14/24  4:58 PM   Result Value Ref Range    GLUCOSE BY METER POCT 180 (H) 70 - 99 mg/dL   Glucose by meter    Collection Time: 06/14/24  9:23 PM   Result Value Ref Range    GLUCOSE BY METER POCT 170 (H) 70 - 99 mg/dL   Basic metabolic panel    Collection Time: 06/15/24  6:31 AM   Result Value Ref Range    Sodium 138 135 - 145 mmol/L    Potassium 5.0 3.4 - 5.3 " mmol/L    Chloride 105 98 - 107 mmol/L    Carbon Dioxide (CO2) 23 22 - 29 mmol/L    Anion Gap 10 7 - 15 mmol/L    Urea Nitrogen 32.4 (H) 8.0 - 23.0 mg/dL    Creatinine 1.26 (H) 0.51 - 0.95 mg/dL    GFR Estimate 42 (L) >60 mL/min/1.73m2    Calcium 9.7 8.8 - 10.2 mg/dL    Glucose 168 (H) 70 - 99 mg/dL   INR    Collection Time: 06/15/24  6:31 AM   Result Value Ref Range    INR 4.02 (H) 0.85 - 1.15   Glucose    Collection Time: 06/15/24  6:31 AM   Result Value Ref Range    Glucose 168 (H) 70 - 99 mg/dL   CBC with platelets and differential    Collection Time: 06/15/24  6:31 AM   Result Value Ref Range    WBC Count 13.9 (H) 4.0 - 11.0 10e3/uL    RBC Count 3.73 (L) 3.80 - 5.20 10e6/uL    Hemoglobin 11.4 (L) 11.7 - 15.7 g/dL    Hematocrit 35.2 35.0 - 47.0 %    MCV 94 78 - 100 fL    MCH 30.6 26.5 - 33.0 pg    MCHC 32.4 31.5 - 36.5 g/dL    RDW 12.6 10.0 - 15.0 %    Platelet Count 162 150 - 450 10e3/uL    % Neutrophils 86 %    % Lymphocytes 3 %    % Monocytes 9 %    % Eosinophils 1 %    % Basophils 0 %    % Immature Granulocytes 1 %    NRBCs per 100 WBC 0 <1 /100    Absolute Neutrophils 11.9 (H) 1.6 - 8.3 10e3/uL    Absolute Lymphocytes 0.4 (L) 0.8 - 5.3 10e3/uL    Absolute Monocytes 1.3 0.0 - 1.3 10e3/uL    Absolute Eosinophils 0.1 0.0 - 0.7 10e3/uL    Absolute Basophils 0.0 0.0 - 0.2 10e3/uL    Absolute Immature Granulocytes 0.1 <=0.4 10e3/uL    Absolute NRBCs 0.0 10e3/uL   Hepatic panel    Collection Time: 06/15/24  6:31 AM   Result Value Ref Range    Protein Total 6.2 (L) 6.4 - 8.3 g/dL    Albumin 3.4 (L) 3.5 - 5.2 g/dL    Bilirubin Total 0.4 <=1.2 mg/dL    Alkaline Phosphatase 120 40 - 150 U/L    AST 99 (H) 0 - 45 U/L     (H) 0 - 50 U/L    Bilirubin Direct <0.20 0.00 - 0.30 mg/dL   Glucose by meter    Collection Time: 06/15/24  7:21 AM   Result Value Ref Range    GLUCOSE BY METER POCT 151 (H) 70 - 99 mg/dL   Erythrocyte sedimentation rate auto    Collection Time: 06/15/24 11:58 AM   Result Value Ref Range     Erythrocyte Sedimentation Rate 58 (H) 0 - 30 mm/hr   CRP inflammation    Collection Time: 06/15/24 11:58 AM   Result Value Ref Range    CRP Inflammation 146.00 (H) <5.00 mg/L   Glucose by meter    Collection Time: 06/15/24  2:37 PM   Result Value Ref Range    GLUCOSE BY METER POCT 151 (H) 70 - 99 mg/dL   Glucose by meter    Collection Time: 06/15/24  5:28 PM   Result Value Ref Range    GLUCOSE BY METER POCT 160 (H) 70 - 99 mg/dL   Glucose by meter    Collection Time: 06/15/24  9:05 PM   Result Value Ref Range    GLUCOSE BY METER POCT 262 (H) 70 - 99 mg/dL   Glucose by meter    Collection Time: 06/16/24  6:36 AM   Result Value Ref Range    GLUCOSE BY METER POCT 142 (H) 70 - 99 mg/dL   INR    Collection Time: 06/16/24  7:18 AM   Result Value Ref Range    INR 2.87 (H) 0.85 - 1.15   Comprehensive metabolic panel    Collection Time: 06/16/24  8:56 AM   Result Value Ref Range    Sodium 138 135 - 145 mmol/L    Potassium 4.5 3.4 - 5.3 mmol/L    Carbon Dioxide (CO2) 21 (L) 22 - 29 mmol/L    Anion Gap 12 7 - 15 mmol/L    Urea Nitrogen 33.7 (H) 8.0 - 23.0 mg/dL    Creatinine 1.26 (H) 0.51 - 0.95 mg/dL    GFR Estimate 42 (L) >60 mL/min/1.73m2    Calcium 8.9 8.8 - 10.2 mg/dL    Chloride 105 98 - 107 mmol/L    Glucose 176 (H) 70 - 99 mg/dL    Alkaline Phosphatase 115 40 - 150 U/L    AST 50 (H) 0 - 45 U/L    ALT 97 (H) 0 - 50 U/L    Protein Total 6.0 (L) 6.4 - 8.3 g/dL    Albumin 3.2 (L) 3.5 - 5.2 g/dL    Bilirubin Total 0.5 <=1.2 mg/dL   Extra Purple Top Tube    Collection Time: 06/16/24  8:56 AM   Result Value Ref Range    Hold Specimen JIC    Glucose by meter    Collection Time: 06/16/24 11:28 AM   Result Value Ref Range    GLUCOSE BY METER POCT 167 (H) 70 - 99 mg/dL   Glucose by meter    Collection Time: 06/16/24  4:48 PM   Result Value Ref Range    GLUCOSE BY METER POCT 241 (H) 70 - 99 mg/dL   Glucose by meter    Collection Time: 06/16/24  8:44 PM   Result Value Ref Range    GLUCOSE BY METER POCT 163 (H) 70 - 99 mg/dL    Glucose by meter    Collection Time: 06/17/24  2:17 AM   Result Value Ref Range    GLUCOSE BY METER POCT 144 (H) 70 - 99 mg/dL   Glucose by meter    Collection Time: 06/17/24  6:30 AM   Result Value Ref Range    GLUCOSE BY METER POCT 131 (H) 70 - 99 mg/dL   INR    Collection Time: 06/17/24  7:46 AM   Result Value Ref Range    INR 1.93 (H) 0.85 - 1.15   Creatinine    Collection Time: 06/17/24  7:46 AM   Result Value Ref Range    Creatinine 1.40 (H) 0.51 - 0.95 mg/dL    GFR Estimate 37 (L) >60 mL/min/1.73m2   Extra Purple Top Tube    Collection Time: 06/17/24  7:46 AM   Result Value Ref Range    Hold Specimen JIC    CBC with platelets    Collection Time: 06/17/24  7:46 AM   Result Value Ref Range    WBC Count 10.3 4.0 - 11.0 10e3/uL    RBC Count 3.52 (L) 3.80 - 5.20 10e6/uL    Hemoglobin 10.8 (L) 11.7 - 15.7 g/dL    Hematocrit 33.5 (L) 35.0 - 47.0 %    MCV 95 78 - 100 fL    MCH 30.7 26.5 - 33.0 pg    MCHC 32.2 31.5 - 36.5 g/dL    RDW 12.6 10.0 - 15.0 %    Platelet Count 201 150 - 450 10e3/uL   Glucose by meter    Collection Time: 06/17/24 11:11 AM   Result Value Ref Range    GLUCOSE BY METER POCT 140 (H) 70 - 99 mg/dL   Glucose by meter    Collection Time: 06/17/24  9:28 PM   Result Value Ref Range    GLUCOSE BY METER POCT 158 (H) 70 - 99 mg/dL   Glucose by meter    Collection Time: 06/18/24  2:46 AM   Result Value Ref Range    GLUCOSE BY METER POCT 74 70 - 99 mg/dL   Glucose by meter    Collection Time: 06/18/24  3:30 AM   Result Value Ref Range    GLUCOSE BY METER POCT 154 (H) 70 - 99 mg/dL   Glucose by meter    Collection Time: 06/18/24  6:10 AM   Result Value Ref Range    GLUCOSE BY METER POCT 131 (H) 70 - 99 mg/dL   INR    Collection Time: 06/18/24  6:40 AM   Result Value Ref Range    INR 1.83 (H) 0.85 - 1.15   Creatinine    Collection Time: 06/18/24 10:28 AM   Result Value Ref Range    Creatinine 1.21 (H) 0.51 - 0.95 mg/dL    GFR Estimate 44 (L) >60 mL/min/1.73m2   Glucose by meter    Collection Time: 06/18/24  11:15 AM   Result Value Ref Range    GLUCOSE BY METER POCT 134 (H) 70 - 99 mg/dL   Glucose by meter    Collection Time: 06/18/24  4:48 PM   Result Value Ref Range    GLUCOSE BY METER POCT 166 (H) 70 - 99 mg/dL   Vancomycin level    Collection Time: 06/18/24  7:58 PM   Result Value Ref Range    Vancomycin 21.5   ug/mL   Glucose by meter    Collection Time: 06/18/24  9:23 PM   Result Value Ref Range    GLUCOSE BY METER POCT 164 (H) 70 - 99 mg/dL   Glucose by meter    Collection Time: 06/19/24  2:24 AM   Result Value Ref Range    GLUCOSE BY METER POCT 141 (H) 70 - 99 mg/dL   Glucose by meter    Collection Time: 06/19/24  7:05 AM   Result Value Ref Range    GLUCOSE BY METER POCT 120 (H) 70 - 99 mg/dL   INR    Collection Time: 06/19/24  7:07 AM   Result Value Ref Range    INR 2.28 (H) 0.85 - 1.15   Creatinine    Collection Time: 06/19/24  7:07 AM   Result Value Ref Range    Creatinine 1.23 (H) 0.51 - 0.95 mg/dL    GFR Estimate 43 (L) >60 mL/min/1.73m2   CRP inflammation    Collection Time: 06/19/24  7:07 AM   Result Value Ref Range    CRP Inflammation 92.90 (H) <5.00 mg/L   Erythrocyte sedimentation rate auto    Collection Time: 06/19/24  7:07 AM   Result Value Ref Range    Erythrocyte Sedimentation Rate 66 (H) 0 - 30 mm/hr   Swab Aerobic Bacterial Culture Routine With Gram Stain    Collection Time: 06/19/24 11:07 AM    Specimen: Marcos, Right; Swab   Result Value Ref Range    Gram Stain Result 2+ Gram positive cocci (A)     Gram Stain Result 2+ WBC seen (A)

## 2024-06-19 NOTE — PROGRESS NOTES
Patient vital signs are at baseline: No,  Reason:  needing 1L o2 NC  Patient able to ambulate as they were prior to admission or with assist devices provided by therapies during their stay:  No,  Reason:  painful  Patient MUST void prior to discharge:  No,  Reason:  discharging with schilling  Patient able to tolerate oral intake:  Yes  Pain has adequate pain control using Oral analgesics:  Yes  Does patient have an identified :  Yes  Has goal D/C date and time been discussed with patient:  Yes  TCU- [facility]- accepted

## 2024-06-19 NOTE — PROGRESS NOTES
"Orthopedic Progress Note      Assessment:    Right lower leg cellulitis     Plan:   - Continue PT/OT  - Weightbearing status: WBAT  - Anticoagulation: Warfarin   - wound treated per WOC nurse  - Discharge planning: per medicine      Subjective:  Pain: controlled on current medication  Chest pain, SOB: no  Nausea, Vomiting:  nauseous  Lightheadedness, Dizziness:  no  Neuro:  Patient denies new onset numbness or paresthesias     Patient states she is doing well.  Has concerns about discharging to TCU. Discussed with patient that she will be unable to care for self at home and a TCU will be a better option for her until she is able to ambulate independently. States she has been unable to WB on right leg; ambulates with walker to bathroom as long as someone is holding her hand and there is a gait belt on as well.     Objective:  BP (!) 144/65 (BP Location: Left arm)   Pulse 80   Temp 98.1  F (36.7  C) (Oral)   Resp 16   Ht 1.651 m (5' 5\")   Wt 73.2 kg (161 lb 6.4 oz)   SpO2 95%   BMI 26.86 kg/m       The patient is A&Ox3. Appears comfortable.Resting in bed.   Sensation is intact.  Unable to dorsiflex RLE, able to slightly wiggle toes R foot  Dorsiflexion and plantar flexion is intact LLE  Pitting edema right foot  Dorsalis pedis pulse intact.  Calves are soft and non-tender. Negative Thomas's.  The incision is covered. Dressing C/D/I. Regression of erythema compared to prior outlined area on right anterior lower leg. Tenderness to palpation of the anterior shin.     Pertinent Labs   Lab Results: personally reviewed.   Lab Results   Component Value Date    INR 2.28 (H) 06/19/2024    INR 1.83 (H) 06/18/2024    INR 1.93 (H) 06/17/2024     Lab Results   Component Value Date    WBC 10.3 06/17/2024    HGB 10.8 (L) 06/17/2024    HCT 33.5 (L) 06/17/2024    MCV 95 06/17/2024     06/17/2024     Lab Results   Component Value Date     06/16/2024    CO2 21 (L) 06/16/2024         Report completed by:  Marti KWAN" KRISTINE Ontiveros/Dr. Chand  [facility]    Date: 6/19/2024  Time: 9:38 AM     Ortho will sign off. Please contact with any further questions or concerns.

## 2024-06-19 NOTE — PHARMACY-VANCOMYCIN DOSING SERVICE
Pharmacy Vancomycin Note  Date of Service 2024  Patient's  1940   84 year old, female    Indication: Skin and Soft Tissue Infection  Day of Therapy: 5  Current vancomycin regimen:  1,000 mg IV q24h  Current vancomycin monitoring method: AUC  Current vancomycin therapeutic monitoring goal: 400-600 mg*h/L    [employer] Prediction of Current Vancomycin Regimen  Loading dose: N/A  Regimen: 1000 mg IV every 24 hours.  Start time: 08:29 on 2024  Exposure target: AUC24 (range)400-600 mg/L.hr   AUC24,ss: 547 mg/L.hr  Probability of AUC24 > 400: 98 %  Ctrough,ss: 17.6 mg/L  Probability of Ctrough,ss > 20: 29 %  Probability of nephrotoxicity (Lodise DELMER ): 13 %      Current estimated CrCl = Estimated Creatinine Clearance: 35.3 mL/min (A) (based on SCr of 1.21 mg/dL (H)).    Creatinine for last 3 days  2024:  8:56 AM Creatinine 1.26 mg/dL  2024:  7:46 AM Creatinine 1.40 mg/dL  2024: 10:28 AM Creatinine 1.21 mg/dL    Recent Vancomycin Levels (past 3 days)  2024:  7:58 PM Vancomycin 21.5 ug/mL    Vancomycin IV Administrations (past 72 hours)                     vancomycin (VANCOCIN) 1,000 mg in NaCl 0.9% 200 mL intermittent infusion (mg) 1,000 mg Given 24 0829     1,000 mg Given 24 1010     1,000 mg Given 24 0821                    Nephrotoxins and other renal medications (From now, onward)      Start     Dose/Rate Route Frequency Ordered Stop    06/15/24 0900  vancomycin (VANCOCIN) 1,000 mg in NaCl 0.9% 200 mL intermittent infusion         1,000 mg  over 60 Minutes Intravenous EVERY 24 HOURS 06/15/24 0842                 Contrast Orders - past 72 hours (72h ago, onward)      None            Interpretation of levels and current regimen:  Vancomycin level is reflective of -600    Has serum creatinine changed greater than 50% in last 72 hours: No    Urine output:  good urine output    Renal Function: Stable    [employer] Prediction of Planned New Vancomycin  Regimen  Loading dose: N/A  Regimen: 1000 mg IV every 24 hours.  Start time: 08:29 on 06/19/2024  Exposure target: AUC24 (range)400-600 mg/L.hr   AUC24,ss: 547 mg/L.hr  Probability of AUC24 > 400: 98 %  Ctrough,ss: 17.6 mg/L  Probability of Ctrough,ss > 20: 29 %  Probability of nephrotoxicity (Lodise DELMER 2009): 13 %      Plan:  Continue Current Dose  Vancomycin monitoring method: AUC  Vancomycin therapeutic monitoring goal: 400-600 mg*h/L  Pharmacy will check vancomycin levels as appropriate in 3-5 Days.  Serum creatinine levels will be ordered daily for the first week of therapy and at least twice weekly for subsequent weeks.    Thank you for the consult.  Nieves Lara, PharmD, BCPS

## 2024-06-19 NOTE — PROGRESS NOTES
"INFECTIOUS DISEASE FOLLOW UP NOTE    Date: 2024   CHIEF COMPLAINT:   Chief Complaint   Patient presents with    Leg Pain        ASSESSMENT:    RLE cellulitis: following excision of cancer. Slow improvement. Leukocytosis resolved. Still significant pain although improved. Purulent drainage on exam , culture sent.   Comorbid conditions affecting immune system: DM, CKD    PLAN:  - ceftri  - vanc  - wound culture collected today  - needs to elevate legs above heart, recommend 4-5 pillows  - further recommendations to follow clinical course  - ID will follow, thanks    Debi Light MD  [facility]   Office Telephone [phone].  Fax [phone]  [employer] paging    ______________________________________________________________________    SUBJECTIVE / INTERVAL HISTORY: feels ok. Didn't tolerate elevating foot due to knee pain.     ROS: All other systems negative except as listed above.    SH/FH/Habits/PMH reviewed and unchanged.    OBJECTIVE:  BP (!) 144/65 (BP Location: Left arm)   Pulse 80   Temp 98.1  F (36.7  C) (Oral)   Resp 16   Ht 1.651 m (5' 5\")   Wt 73.2 kg (161 lb 6.4 oz)   SpO2 95%   BMI 26.86 kg/m       Resp: 16      Vital Signs  Temp: 98.1  F (36.7  C)  Temp src: Oral  Resp: 16  Pulse: 80  Pulse Rate Source: Monitor  BP: (!) 144/65  BP Location: Left arm    Temp (24hrs), Av.4  F (36.9  C), Min:98  F (36.7  C), Max:99.2  F (37.3  C)      GEN: No acute distress.    RESPIRATORY:  Normal breathing pattern. Clear to auscultation bilaterally  CARDIOVASCULAR:  Regular rate and rhythm. No murmur, click, gallop or rub.   ABDOMEN:  Soft, normal bowel sounds, non-tender,   EXTREMITIES: No edema.  SKIN/HAIR/NAILS: right foot swelling, erythema improves with elevation. Bloody purulent drainage today, sent for culture.   IV:  peripheral     Antibiotics:  Ceftri  vanc    Pertinent labs:  No results found for: \"CRP\"   CBC RESULTS:   Recent Labs   Lab Test 24  0746   WBC " 10.3   RBC 3.52*   HGB 10.8*   HCT 33.5*   MCV 95   MCH 30.7   MCHC 32.2   RDW 12.6         Last Comprehensive Metabolic Panel:  Sodium   Date Value Ref Range Status   06/16/2024 138 135 - 145 mmol/L Final     Comment:     Reference intervals for this test were updated on 09/26/2023 to more accurately reflect our healthy population. There may be differences in the flagging of prior results with similar values performed with this method. Interpretation of those prior results can be made in the context of the updated reference intervals.      Potassium   Date Value Ref Range Status   06/16/2024 4.5 3.4 - 5.3 mmol/L Final   04/18/2022 4.7 3.5 - 5.0 mmol/L Final     Chloride   Date Value Ref Range Status   06/16/2024 105 98 - 107 mmol/L Final   04/18/2022 107 98 - 107 mmol/L Final     Carbon Dioxide (CO2)   Date Value Ref Range Status   06/16/2024 21 (L) 22 - 29 mmol/L Final   04/18/2022 22 22 - 31 mmol/L Final     Anion Gap   Date Value Ref Range Status   06/16/2024 12 7 - 15 mmol/L Final   04/18/2022 12 5 - 18 mmol/L Final     Glucose   Date Value Ref Range Status   04/18/2022 142 (H) 70 - 125 mg/dL Final     GLUCOSE BY METER POCT   Date Value Ref Range Status   06/19/2024 120 (H) 70 - 99 mg/dL Final     Urea Nitrogen   Date Value Ref Range Status   06/16/2024 33.7 (H) 8.0 - 23.0 mg/dL Final   04/18/2022 43 (H) 8 - 28 mg/dL Final     Creatinine   Date Value Ref Range Status   06/19/2024 1.23 (H) 0.51 - 0.95 mg/dL Final     GFR Estimate   Date Value Ref Range Status   06/19/2024 43 (L) >60 mL/min/1.73m2 Final     Comment:     eGFR calculated using 2021 CKD-EPI equation.   09/24/2019 9 (L) >60 mL/min/1.73m2 Final     Calcium   Date Value Ref Range Status   06/16/2024 8.9 8.8 - 10.2 mg/dL Final        MICROBIOLOGY DATA:  Personally reviewed.  7-Day Micro Results       Collected Updated Procedure Result Status      06/19/2024 1107 06/19/2024 1111 Swab Aerobic Bacterial Culture Routine With Gram Stain [[id number]]    Swab from Marcos, Right    In process Component Value   No component results               06/14/2024 0744 06/18/2024 1205 Blood Culture Peripheral Blood [[id number]]   Peripheral Blood    Preliminary result Component Value   Culture No growth after 4 days  [P]                         RADIOLOGY:  Personally Reviewed.  No results found for this or any previous visit (from the past 24 hour(s)).    Principal Problem:    Cellulitis of right leg  Active Problems:    HTN (hypertension)    DMII (diabetes mellitus, type 2) (H)    Sepsis, due to unspecified organism, unspecified whether acute organ dysfunction present (H)    Chronic atrial fibrillation (H)    Warfarin anticoagulation    DNR (do not resuscitate)    Skin cancer

## 2024-06-19 NOTE — PLAN OF CARE
Patient vital signs are at baseline: No,  Reason:  pt is on supplemental oxygen, 2L NC.   Patient able to ambulate as they were prior to admission or with assist devices provided by therapies during their stay:  No,  Reason:  pt has spent most of the day in the bed. Worked with therapy and refused the chair when offered. Pt stated that she just feels so weak. RN and pt had a conversation regarding exercises.   Patient MUST void prior to discharge:  No,  Reason:  Villalobos in place.   Patient able to tolerate oral intake:  Yes, pt has had a minimal apatite. Blood glucoses have been obtained, and covered with carb count and sliding scale insulin.   Pain has adequate pain control using Oral analgesics:  Yes, pain has been managed with PRN medications.   Does patient have an identified :  Yes  Has goal D/C date and time been discussed with patient:  Yes, pending TCU placement.        Problem: Adult Inpatient Plan of Care  Goal: Optimal Comfort and Wellbeing  Intervention: Monitor Pain and Promote Comfort  Recent Flowsheet Documentation  Taken 6/19/2024 0134 by Ronald Alexis, RN  Pain Management Interventions:   care clustered   emotional support   environmental changes   medication offered but refused   rest   repositioned   medication (see MAR)

## 2024-06-19 NOTE — PROGRESS NOTES
CLINICAL NUTRITION SERVICES - REASSESSMENT NOTE     Nutrition Prescription    RECOMMENDATIONS FOR MDs/PROVIDERS TO ORDER:  Pt constipated but worried she won't make it to bathroom in time if she has a BM.     Malnutrition Status:    Does not meet criteria    Recommendations already ordered by Registered Dietitian (RD):  - Medical food supplement therapy - Glucerna BID  - Nutrition education for recommended modifications - protein sources, prune juice for constipation    Future/Additional Recommendations:  Monitor po intake, ONS tolerance, wound healing     EVALUATION OF THE PROGRESS TOWARD GOALS   Diet: Moderate Consistent Carbohydrate, regular  Nutrition Support: Glucerna daily  Intake: Pt only ordering 1 meal/day per [employer]. Sparse po intake % data.     NEW FINDINGS   Met with pt in room this morning. Pt reports that her appetite goes down when she's in pain, and she's been in a lot of pain with her leg wound this admission. Pt says she feels full much of the time, and recognized that her constipation may be contributing to that. Pt doesn't want to take bowel meds because she's worried about not getting to the bathroom in time, so pt was encouraged (and was agreeable) to ordering prune juice for herself to sip on to help with constipation.    We discussed importance of protein intake for wound healing and general recovery. She's ok getting Glucerna BID and will order high quality protein with each meal (eggs, meat, yogurt, etc).    Labs:  Reviewed.  Cr: 1.23 (H)  B-166 past 24 hours    Meds:  Novolog  Lantus  Vancomycin   Warfarin on hold    GI:  LBM on     Skin:  Edema: 3+ moderate in R foot. 2+ mild in R ankle    Previous Goals   - Patient to consume % of nutritionally adequate meals three times per day, or the equivalent with supplements/snacks. - not met  - Promote healing as needed - progressing    CURRENT NUTRITION DIAGNOSIS  Inadequate protein-energy intake related to pain as evidenced  by decreased intake   Evaluation: ongoing. Pt only ordering 1 meal/day.    INTERVENTIONS  Implementation  - Medical food supplement therapy - Glucerna BID  - Nutrition education for recommended modifications - protein sources, prune juice for constipation    Goals  - Patient to consume % of nutritionally adequate meals three times per day, or the equivalent with supplements/snacks. -   - Promote healing as needed -   - Normalize bowel routine -    Monitoring/Evaluation  Progress toward goals will be monitored and evaluated per protocol.

## 2024-06-20 ENCOUNTER — APPOINTMENT (OUTPATIENT)
Dept: OCCUPATIONAL THERAPY | Facility: CLINIC | Age: 84
DRG: 862 | End: 2024-06-20
Payer: COMMERCIAL

## 2024-06-20 LAB
CREAT SERPL-MCNC: 1.27 MG/DL (ref 0.51–0.95)
EGFRCR SERPLBLD CKD-EPI 2021: 42 ML/MIN/1.73M2
GLUCOSE BLDC GLUCOMTR-MCNC: 133 MG/DL (ref 70–99)
GLUCOSE BLDC GLUCOMTR-MCNC: 147 MG/DL (ref 70–99)
GLUCOSE BLDC GLUCOMTR-MCNC: 152 MG/DL (ref 70–99)
GLUCOSE BLDC GLUCOMTR-MCNC: 181 MG/DL (ref 70–99)
GLUCOSE BLDC GLUCOMTR-MCNC: 227 MG/DL (ref 70–99)
INR PPP: 2.66 (ref 0.85–1.15)

## 2024-06-20 PROCEDURE — 250N000011 HC RX IP 250 OP 636: Mod: JZ | Performed by: INTERNAL MEDICINE

## 2024-06-20 PROCEDURE — 97535 SELF CARE MNGMENT TRAINING: CPT | Mod: GO

## 2024-06-20 PROCEDURE — 250N000013 HC RX MED GY IP 250 OP 250 PS 637: Performed by: HOSPITALIST

## 2024-06-20 PROCEDURE — 250N000013 HC RX MED GY IP 250 OP 250 PS 637: Performed by: INTERNAL MEDICINE

## 2024-06-20 PROCEDURE — 99232 SBSQ HOSP IP/OBS MODERATE 35: CPT | Performed by: INTERNAL MEDICINE

## 2024-06-20 PROCEDURE — 250N000011 HC RX IP 250 OP 636: Performed by: HOSPITALIST

## 2024-06-20 PROCEDURE — 36415 COLL VENOUS BLD VENIPUNCTURE: CPT | Performed by: HOSPITALIST

## 2024-06-20 PROCEDURE — 85610 PROTHROMBIN TIME: CPT | Performed by: HOSPITALIST

## 2024-06-20 PROCEDURE — 82565 ASSAY OF CREATININE: CPT | Performed by: HOSPITALIST

## 2024-06-20 PROCEDURE — 120N000001 HC R&B MED SURG/OB

## 2024-06-20 RX ORDER — WARFARIN SODIUM 2 MG/1
4 TABLET ORAL
Status: COMPLETED | OUTPATIENT
Start: 2024-06-20 | End: 2024-06-20

## 2024-06-20 RX ADMIN — OXYCODONE HYDROCHLORIDE 5 MG: 5 TABLET ORAL at 04:26

## 2024-06-20 RX ADMIN — METOPROLOL SUCCINATE 25 MG: 25 TABLET, EXTENDED RELEASE ORAL at 10:04

## 2024-06-20 RX ADMIN — OXYCODONE HYDROCHLORIDE 5 MG: 5 TABLET ORAL at 10:07

## 2024-06-20 RX ADMIN — HYDROMORPHONE HYDROCHLORIDE 0.2 MG: 0.2 INJECTION, SOLUTION INTRAMUSCULAR; INTRAVENOUS; SUBCUTANEOUS at 16:05

## 2024-06-20 RX ADMIN — OXYCODONE HYDROCHLORIDE 5 MG: 5 TABLET ORAL at 14:09

## 2024-06-20 RX ADMIN — VANCOMYCIN HYDROCHLORIDE 1000 MG: 1 INJECTION, SOLUTION INTRAVENOUS at 10:05

## 2024-06-20 RX ADMIN — METHOCARBAMOL 500 MG: 500 TABLET ORAL at 13:35

## 2024-06-20 RX ADMIN — PRAVASTATIN SODIUM 40 MG: 20 TABLET ORAL at 21:10

## 2024-06-20 RX ADMIN — AMLODIPINE BESYLATE 5 MG: 5 TABLET ORAL at 10:04

## 2024-06-20 RX ADMIN — INSULIN GLARGINE 10 UNITS: 100 INJECTION, SOLUTION SUBCUTANEOUS at 21:11

## 2024-06-20 RX ADMIN — OXYCODONE HYDROCHLORIDE 5 MG: 5 TABLET ORAL at 18:05

## 2024-06-20 RX ADMIN — OXYCODONE HYDROCHLORIDE 5 MG: 5 TABLET ORAL at 22:27

## 2024-06-20 RX ADMIN — WARFARIN SODIUM 4 MG: 2 TABLET ORAL at 18:05

## 2024-06-20 RX ADMIN — CEFTRIAXONE SODIUM 2 G: 2 INJECTION, POWDER, FOR SOLUTION INTRAMUSCULAR; INTRAVENOUS at 13:27

## 2024-06-20 ASSESSMENT — ACTIVITIES OF DAILY LIVING (ADL)
ADLS_ACUITY_SCORE: 35
ADLS_ACUITY_SCORE: 34
ADLS_ACUITY_SCORE: 35
ADLS_ACUITY_SCORE: 34
ADLS_ACUITY_SCORE: 34
ADLS_ACUITY_SCORE: 35
ADLS_ACUITY_SCORE: 34
ADLS_ACUITY_SCORE: 35
ADLS_ACUITY_SCORE: 34
ADLS_ACUITY_SCORE: 35
ADLS_ACUITY_SCORE: 34
ADLS_ACUITY_SCORE: 35
ADLS_ACUITY_SCORE: 33
ADLS_ACUITY_SCORE: 35
ADLS_ACUITY_SCORE: 35

## 2024-06-20 NOTE — PLAN OF CARE
"Patient vital signs are at baseline: No,  Reason:  pt on 2L of O2, pt complained of nasal dryness. Pt stated that she got a bloody nose today, RN did not see any drainage, when looking inside the nose WDL . Pt was placed on humidified oxygen.   Patient able to ambulate as they were prior to admission or with assist devices provided by therapies during their stay:  No,  Reason:  pt got up to the chair with OT, pt then was a 2-3 assist to get back to the bed. Pt stated that her pain was so bad and that she could not bare weight through any of her feet. Pt does have a small red scab on the left side of her left foot. Pt believes that her cellulitis has spread to both feet. RN educated and conversed with pt regarding this situation. Pt stated \"I dont want to get out of bed until my feet are better, it is too painful\" Right leg is elevated on 4-5 pillows to attempt to keep elevated above heart level.   Patient MUST void prior to discharge:  No,  Reason:  Villalobos in place.   Patient able to tolerate oral intake:  No,  Reason:   pt refused to eat breakfast and lunch. When offering pt meal options for lunch pt stated \"no I dont want to eat, and dont ask me about dinner either\" RN educated pt about the importance of diet and the healing process. Pt stated that she also does not want to eat because she does not want to have a BM and potentially soil herself in bed. For Dinner pt had an applesauce and Glucerna shake.   Pain has adequate pain control using Oral analgesics:  No,  Reason:  pt required a dose of IV dilaudid after getting back into bed from the chair. Pt rated pain 12/10, but wanted to rest and see if that would help the pain. After a reassessment pain did not decrease after rest and IV medication was given after the pain plan was reviewed with the pt. Dose of IV medication brought pt down to a 7/10 pain.   Does patient have an identified :  Yes  Has goal D/C date and time been discussed with patient:  Yes, pt " pending to discharge to [facility] TCU.        Problem: Pain Acute  Goal: Optimal Pain Control and Function  Outcome: Not Progressing  Intervention: Prevent or Manage Pain  Recent Flowsheet Documentation  Taken 6/20/2024 1515 by Ronald Alexis RN  Sensory Stimulation Regulation:   care clustered   quiet environment promoted  Medication Review/Management: medications reviewed  Intervention: Optimize Psychosocial Wellbeing  Recent Flowsheet Documentation  Taken 6/20/2024 1515 by Ronald Alexis RN  Supportive Measures:   active listening utilized   self-care encouraged   relaxation techniques promoted   verbalization of feelings encouraged

## 2024-06-20 NOTE — PROGRESS NOTES
"INFECTIOUS DISEASE FOLLOW UP NOTE    Date: 2024   CHIEF COMPLAINT:   Chief Complaint   Patient presents with    Leg Pain        ASSESSMENT:    RLE cellulitis: following excision of cancer. Slow improvement. Leukocytosis resolved. Still significant pain although improved. Purulent drainage on exam , culture sent, GS with GPC, growing staph aureus.   Comorbid conditions affecting immune system: DM, CKD    PLAN:  - continue ceftri  - continue vanc  - follow up wound culture to determine final abx plan.   - trend labs. May need repeat imaging to make sure no abscess developed.   - needs to elevate legs above heart, recommend 4-5 pillows  - further recommendations to follow clinical course  - ID will follow, thanks    Debi Light MD  [facility]   Office Telephone [phone].  Fax [phone]  [employer] paging    ______________________________________________________________________    SUBJECTIVE / INTERVAL HISTORY:   Slightly better. Can wiggles today. Tolerating antibiotics.     ROS: All other systems negative except as listed above.    SH/FH/Habits/PMH reviewed and unchanged.    OBJECTIVE:  /63 (BP Location: Left arm)   Pulse 73   Temp 97.6  F (36.4  C) (Oral)   Resp 20   Ht 1.651 m (5' 5\")   Wt 73.2 kg (161 lb 6.4 oz)   SpO2 96%   BMI 26.86 kg/m       Resp: 20      Vital Signs  Temp: 98.1  F (36.7  C)  Temp src: Oral  Resp: 16  Pulse: 80  Pulse Rate Source: Monitor  BP: (!) 144/65  BP Location: Left arm    Temp (24hrs), Av.4  F (36.9  C), Min:98  F (36.7  C), Max:99.2  F (37.3  C)      GEN: No acute distress.    RESPIRATORY:  Normal breathing pattern.    EXTREMITIES: No edema.  SKIN/HAIR/NAILS: right foot swelling, erythema improves with elevation. Bloody purulent drainage still present today. Slight improvement in foot swelling.   IV:  peripheral     Antibiotics:  Ceftri  vanc    Pertinent labs:  No results found for: \"CRP\"   CBC RESULTS:   Recent Labs   Lab " Test 06/17/24  0746   WBC 10.3   RBC 3.52*   HGB 10.8*   HCT 33.5*   MCV 95   MCH 30.7   MCHC 32.2   RDW 12.6         Last Comprehensive Metabolic Panel:  Sodium   Date Value Ref Range Status   06/16/2024 138 135 - 145 mmol/L Final     Comment:     Reference intervals for this test were updated on 09/26/2023 to more accurately reflect our healthy population. There may be differences in the flagging of prior results with similar values performed with this method. Interpretation of those prior results can be made in the context of the updated reference intervals.      Potassium   Date Value Ref Range Status   06/16/2024 4.5 3.4 - 5.3 mmol/L Final   04/18/2022 4.7 3.5 - 5.0 mmol/L Final     Chloride   Date Value Ref Range Status   06/16/2024 105 98 - 107 mmol/L Final   04/18/2022 107 98 - 107 mmol/L Final     Carbon Dioxide (CO2)   Date Value Ref Range Status   06/16/2024 21 (L) 22 - 29 mmol/L Final   04/18/2022 22 22 - 31 mmol/L Final     Anion Gap   Date Value Ref Range Status   06/16/2024 12 7 - 15 mmol/L Final   04/18/2022 12 5 - 18 mmol/L Final     Glucose   Date Value Ref Range Status   04/18/2022 142 (H) 70 - 125 mg/dL Final     GLUCOSE BY METER POCT   Date Value Ref Range Status   06/20/2024 147 (H) 70 - 99 mg/dL Final     Urea Nitrogen   Date Value Ref Range Status   06/16/2024 33.7 (H) 8.0 - 23.0 mg/dL Final   04/18/2022 43 (H) 8 - 28 mg/dL Final     Creatinine   Date Value Ref Range Status   06/20/2024 1.27 (H) 0.51 - 0.95 mg/dL Final     GFR Estimate   Date Value Ref Range Status   06/20/2024 42 (L) >60 mL/min/1.73m2 Final     Comment:     eGFR calculated using 2021 CKD-EPI equation.   09/24/2019 9 (L) >60 mL/min/1.73m2 Final     Calcium   Date Value Ref Range Status   06/16/2024 8.9 8.8 - 10.2 mg/dL Final        MICROBIOLOGY DATA:  Personally reviewed.  7-Day Micro Results       Collected Updated Procedure Result Status      06/19/2024 1107 06/20/2024 1032 Swab Aerobic Bacterial Culture Routine With  Gram Stain [[id number]]   (Abnormal)   Swab from [place], Right    Preliminary result Component Value   Culture Culture in progress  [P]    Gram Stain Result 2+ Gram positive cocci  [P]     2+ WBC seen  [P]                06/14/2024 0744 06/19/2024 1206 Blood Culture Peripheral Blood [[id number]]   Peripheral Blood    Final result Component Value   Culture No Growth                        RADIOLOGY:  Personally Reviewed.  No results found for this or any previous visit (from the past 24 hour(s)).    Principal Problem:    Cellulitis of right leg  Active Problems:    HTN (hypertension)    DMII (diabetes mellitus, type 2) (H)    Sepsis, due to unspecified organism, unspecified whether acute organ dysfunction present (H)    Chronic atrial fibrillation (H)    Warfarin anticoagulation    DNR (do not resuscitate)    Skin cancer

## 2024-06-20 NOTE — PROGRESS NOTES
Care Management Follow Up    Length of Stay (days): 6    Expected Discharge Date: 06/21/2024     Concerns to be Addressed: discharge planning       Patient plan of care discussed at interdisciplinary rounds: Yes    Anticipated Discharge Disposition:  [facility]     Anticipated Discharge Services: Transportation Services (family vs agency)    Anticipated Discharge DME: None    Patient/family educated on Medicare website which has current facility and service quality ratings: yes    Education Provided on the Discharge Plan: Yes (AVS per bedside RN)    Patient/Family in Agreement with the Plan: yes    Referrals Placed by CM/SW:  TCU         Additional Information:  CM reviewed chart. Patient has been accepted at [facility] TCU. Transportation TBD (family vs agency). PAS was completed 06/19/24. ID MD following. CM will follow.          Katherine Sevilla RN  Care Coordinator

## 2024-06-20 NOTE — PROGRESS NOTES
[facility]    Medicine Progress Note - Hospitalist Service    Date of Admission:  6/14/2024  84-year-old woman with excision skin cancer approximate 10 days ago developed significant pain swelling and infection at the site which has been somewhat slow to resolve she is being treated for cellulitis see problem list for other medical problems which are stable  Staph Aureus with sens pending      Assessment & Plan   Principal Problem:    Cellulitis of right leg    Date Noted: 6/18/2024  Active Problems:    HTN (hypertension)    Date Noted: 9/21/2019    DMII (diabetes mellitus, type 2) (H)    Date Noted: 9/21/2019    Sepsis, due to unspecified organism, unspecified whether acute organ dysfunction present (H)    Date Noted: 6/14/2024    Chronic atrial fibrillation (H)    Date Noted: 6/19/2024    Warfarin anticoagulation    Date Noted: 6/19/2024    DNR (do not resuscitate)    Date Noted: 6/19/2024    Skin cancer    Date Noted: 6/19/2024      ASSESSMENT per ID:     RLE cellulitis: following excision of cancer. Slow improvement. Leukocytosis resolved. Still significant pain although improved. Purulent drainage on exam 6/19, culture sent, GS with GPC, growing staph aureus.   Comorbid conditions affecting immune system: DM, CKD     PLAN:  - continue ceftri  - continue vanc  - follow up wound culture to determine final abx plan.   - trend labs. May need repeat imaging to make sure no abscess developed.   - needs to elevate legs above heart, recommend 4-5 pillows  - further recommendations to follow clinical course  - ID will follow, thanks     Debi Light MD        Diet: Consistent Carbohydrate Diet Moderate Consistent Carb (60 g CHO per Meal) Diet  Snacks/Supplements Adult: Glucerna; With Meals    DVT Prophylaxis: DOAC  Villalobos Catheter: PRESENT, indication: Acute retention or obstruction  Lines: None     Cardiac Monitoring: None  Code Status: No CPR- Do NOT Intubate      Clinically Significant  "Risk Factors              # Hypoalbuminemia: Lowest albumin = 3.2 g/dL at 6/16/2024  8:56 AM, will monitor as appropriate     # Hypertension: Noted on problem list             # DMII: A1C = 7.0 % (Ref range: <5.7 %) within past 6 months   # Overweight: Estimated body mass index is 26.86 kg/m  as calculated from the following:    Height as of this encounter: 1.651 m (5' 5\").    Weight as of this encounter: 73.2 kg (161 lb 6.4 oz).      # Financial/Environmental Concerns: none         Disposition Plan     Medically Ready for Discharge: Anticipated in 2-4 Days             Zana Wong MD  Hospitalist Service  [facility]  Securely message with [email or web address] (more info)  Text page via [email or web address] Paging/Directory   ______________________________________________________________________    Interval History   {Pertinent overnight events perhaps some slow progress in the swelling and pain of her leg but things are very slow going    Physical Exam   Vital Signs: Temp: 98.5  F (36.9  C) Temp src: Oral BP: 133/71 Pulse: 83   Resp: 18 SpO2: 92 % O2 Device: Nasal cannula Oxygen Delivery: 2 LPM  Weight: 161 lbs 6.4 oz  There is still deep rubor over the right lower shin  Puffy red rubor less intense dorsum of the foot with some associated edema  No evident blistering  Perhaps somewhat less intense than yesterday but progress is very slow  ----------------------------------------------------------------------------------------    Medical Decision Making       25 MINUTES SPENT BY ME on the date of service doing chart review, history, exam, documentation & further activities per the note.        Data     "

## 2024-06-20 NOTE — PLAN OF CARE
Problem: Skin or Soft Tissue Infection  Goal: Absence of Infection Signs and Symptoms  Outcome: Progressing  Intervention: Minimize and Manage Infection Progression    Patient a/o x4, VSS but requiring 2L of O2 NC. IV fluids SL. Tolerated oral medications. Not OOB during shift, but shifts weight well PRN Q2 micro turns. Villalobos intact with good yellow output.  Mepilex dressing on RLE CDI and CMS intact. Lower extremities elevated with 4-5 pillows above heart.  Pain was controlled with PRN Oxycodone, scheduled medications, ice therapy, and rest. Tolerated 60 g carb diet, along with ACHS checks.  Denied chest pain, SOB, lightheadedness, dizziness, or N/V.  Bed alarm on for safety precautions. Utilizes call light appropriately.  Hopeful to discharge to TCU.

## 2024-06-21 ENCOUNTER — APPOINTMENT (OUTPATIENT)
Dept: MRI IMAGING | Facility: CLINIC | Age: 84
DRG: 862 | End: 2024-06-21
Attending: INTERNAL MEDICINE
Payer: COMMERCIAL

## 2024-06-21 LAB
BACTERIA SPEC CULT: ABNORMAL
CREAT SERPL-MCNC: 1.31 MG/DL (ref 0.51–0.95)
CRP SERPL-MCNC: 104 MG/L
EGFRCR SERPLBLD CKD-EPI 2021: 40 ML/MIN/1.73M2
ERYTHROCYTE [SEDIMENTATION RATE] IN BLOOD BY WESTERGREN METHOD: 82 MM/HR (ref 0–30)
GLUCOSE BLDC GLUCOMTR-MCNC: 151 MG/DL (ref 70–99)
GLUCOSE BLDC GLUCOMTR-MCNC: 186 MG/DL (ref 70–99)
GLUCOSE BLDC GLUCOMTR-MCNC: 208 MG/DL (ref 70–99)
GLUCOSE BLDC GLUCOMTR-MCNC: 220 MG/DL (ref 70–99)
GRAM STAIN RESULT: ABNORMAL
GRAM STAIN RESULT: ABNORMAL
INR PPP: 2.65 (ref 0.85–1.15)

## 2024-06-21 PROCEDURE — 85610 PROTHROMBIN TIME: CPT | Performed by: HOSPITALIST

## 2024-06-21 PROCEDURE — 250N000013 HC RX MED GY IP 250 OP 250 PS 637: Performed by: INTERNAL MEDICINE

## 2024-06-21 PROCEDURE — 250N000011 HC RX IP 250 OP 636: Mod: JZ | Performed by: INTERNAL MEDICINE

## 2024-06-21 PROCEDURE — 120N000001 HC R&B MED SURG/OB

## 2024-06-21 PROCEDURE — 82565 ASSAY OF CREATININE: CPT | Performed by: HOSPITALIST

## 2024-06-21 PROCEDURE — 99232 SBSQ HOSP IP/OBS MODERATE 35: CPT | Performed by: INTERNAL MEDICINE

## 2024-06-21 PROCEDURE — 250N000011 HC RX IP 250 OP 636: Performed by: HOSPITALIST

## 2024-06-21 PROCEDURE — 85652 RBC SED RATE AUTOMATED: CPT | Performed by: INTERNAL MEDICINE

## 2024-06-21 PROCEDURE — 86140 C-REACTIVE PROTEIN: CPT | Performed by: INTERNAL MEDICINE

## 2024-06-21 PROCEDURE — 36415 COLL VENOUS BLD VENIPUNCTURE: CPT | Performed by: INTERNAL MEDICINE

## 2024-06-21 PROCEDURE — 255N000002 HC RX 255 OP 636: Mod: JZ | Performed by: INTERNAL MEDICINE

## 2024-06-21 PROCEDURE — A9585 GADOBUTROL INJECTION: HCPCS | Mod: JZ | Performed by: INTERNAL MEDICINE

## 2024-06-21 PROCEDURE — 250N000013 HC RX MED GY IP 250 OP 250 PS 637: Performed by: HOSPITALIST

## 2024-06-21 PROCEDURE — 73720 MRI LWR EXTREMITY W/O&W/DYE: CPT | Mod: RT

## 2024-06-21 RX ORDER — WARFARIN SODIUM 7.5 MG/1
7.5 TABLET ORAL
Status: COMPLETED | OUTPATIENT
Start: 2024-06-21 | End: 2024-06-21

## 2024-06-21 RX ORDER — GADOBUTROL 604.72 MG/ML
7.5 INJECTION INTRAVENOUS ONCE
Status: COMPLETED | OUTPATIENT
Start: 2024-06-21 | End: 2024-06-21

## 2024-06-21 RX ADMIN — OXYCODONE HYDROCHLORIDE 5 MG: 5 TABLET ORAL at 22:46

## 2024-06-21 RX ADMIN — AMLODIPINE BESYLATE 5 MG: 5 TABLET ORAL at 08:02

## 2024-06-21 RX ADMIN — OXYCODONE HYDROCHLORIDE 5 MG: 5 TABLET ORAL at 13:40

## 2024-06-21 RX ADMIN — CEFTRIAXONE SODIUM 2 G: 2 INJECTION, POWDER, FOR SOLUTION INTRAMUSCULAR; INTRAVENOUS at 12:51

## 2024-06-21 RX ADMIN — INSULIN GLARGINE 10 UNITS: 100 INJECTION, SOLUTION SUBCUTANEOUS at 22:16

## 2024-06-21 RX ADMIN — PRAVASTATIN SODIUM 40 MG: 20 TABLET ORAL at 22:16

## 2024-06-21 RX ADMIN — OXYCODONE HYDROCHLORIDE 5 MG: 5 TABLET ORAL at 09:35

## 2024-06-21 RX ADMIN — GADOBUTROL 7.5 ML: 604.72 INJECTION INTRAVENOUS at 21:35

## 2024-06-21 RX ADMIN — VANCOMYCIN HYDROCHLORIDE 1000 MG: 1 INJECTION, SOLUTION INTRAVENOUS at 09:35

## 2024-06-21 RX ADMIN — METOPROLOL SUCCINATE 25 MG: 25 TABLET, EXTENDED RELEASE ORAL at 08:02

## 2024-06-21 RX ADMIN — OXYCODONE HYDROCHLORIDE 5 MG: 5 TABLET ORAL at 03:51

## 2024-06-21 RX ADMIN — WARFARIN SODIUM 7.5 MG: 7.5 TABLET ORAL at 17:00

## 2024-06-21 RX ADMIN — OXYCODONE HYDROCHLORIDE 5 MG: 5 TABLET ORAL at 17:42

## 2024-06-21 ASSESSMENT — ACTIVITIES OF DAILY LIVING (ADL)
ADLS_ACUITY_SCORE: 36
ADLS_ACUITY_SCORE: 35
ADLS_ACUITY_SCORE: 31
ADLS_ACUITY_SCORE: 35
ADLS_ACUITY_SCORE: 36
ADLS_ACUITY_SCORE: 32
ADLS_ACUITY_SCORE: 32
ADLS_ACUITY_SCORE: 35
ADLS_ACUITY_SCORE: 36
ADLS_ACUITY_SCORE: 35
ADLS_ACUITY_SCORE: 36
ADLS_ACUITY_SCORE: 35
ADLS_ACUITY_SCORE: 36
ADLS_ACUITY_SCORE: 31
ADLS_ACUITY_SCORE: 36
ADLS_ACUITY_SCORE: 35
ADLS_ACUITY_SCORE: 32
ADLS_ACUITY_SCORE: 36
ADLS_ACUITY_SCORE: 32

## 2024-06-21 NOTE — PROGRESS NOTES
[facility]    Medicine Progress Note - Hospitalist Service    Date of Admission:  6/14/2024    84-year-old woman with excision skin cancer approximate 10 days ago developed significant pain swelling and infection at the site which has been somewhat slow to resolve she is being treated for cellulitis see problem list for other medical problems which are stable  Staph Aureus with sens pending  Will need TCU elderly  >90; she is debilitated    Infectious disease summary note  RLE cellulitis: following excision of cancer. Slow improvement. Leukocytosis resolved. Still significant pain although improved. Purulent drainage on exam 6/19, culture sent, GS with GPC, growing staph aureus. Active issue.   Comorbid conditions affecting immune system: DM, CKD     PLAN:  - continue ceftri  - continue vanc  - follow up susceptibilities on staph aureus  - CRP, SED increasing will repeat imaging to see if abscess-if present needs source control  - continue to elevate legs  - discussed with patient and hospitalist  - further recommendations to follow clinical course  - ID will follow, thanks     Debi Light MD      Assessment & Plan   Principal Problem:    Cellulitis of right leg    Date Noted: 6/18/2024  Active Problems:    HTN (hypertension)    Date Noted: 9/21/2019    DMII (diabetes mellitus, type 2) (H)    Date Noted: 9/21/2019    Sepsis, due to unspecified organism, unspecified whether acute organ dysfunction present (H)    Date Noted: 6/14/2024    Chronic atrial fibrillation (H)    Date Noted: 6/19/2024    Warfarin anticoagulation    Date Noted: 6/19/2024    DNR (do not resuscitate)    Date Noted: 6/19/2024    Skin cancer    Date Noted: 6/19/2024              Diet: Consistent Carbohydrate Diet Moderate Consistent Carb (60 g CHO per Meal) Diet  Snacks/Supplements Adult: Glucerna; With Meals    DVT Prophylaxis: Warfarin  Villalobos Catheter: PRESENT, indication: Acute retention or  "obstruction  Lines: None     Cardiac Monitoring: None  Code Status: No CPR- Do NOT Intubate      Clinically Significant Risk Factors              # Hypoalbuminemia: Lowest albumin = 3.2 g/dL at 6/16/2024  8:56 AM, will monitor as appropriate     # Hypertension: Noted on problem list             # DMII: A1C = 7.0 % (Ref range: <5.7 %) within past 6 months   # Overweight: Estimated body mass index is 26.86 kg/m  as calculated from the following:    Height as of this encounter: 1.651 m (5' 5\").    Weight as of this encounter: 73.2 kg (161 lb 6.4 oz).      # Financial/Environmental Concerns: none         Disposition Plan     Medically Ready for Discharge: Anticipated in 2-4 Days             Zana Wong MD  Hospitalist Service  [facility]  Securely message with [employer] (more info)  Text page via [email or web address] Paging/Directory   ______________________________________________________________________    Interval History   {Pertinent overnight events  still with pain but perhaps decreased foot swelling and intensity of redness     Physical Exam   Vital Signs: Temp: 98.5  F (36.9  C) Temp src: Oral BP: (!) 145/63 Pulse: 86   Resp: 16 SpO2: 93 % O2 Device: Nasal cannula Oxygen Delivery: 1/2 LPM  Weight: 161 lbs 6.4 oz        Medical Decision Making       25 MINUTES SPENT BY ME on the date of service doing chart review, history, exam, documentation & further activities per the note.          Data     "

## 2024-06-21 NOTE — PLAN OF CARE
Problem: Adult Inpatient Plan of Care  Goal: Optimal Comfort and Wellbeing  Outcome: Progressing     Problem: Adult Inpatient Plan of Care  Goal: Absence of Hospital-Acquired Illness or Injury  Intervention: Prevent Skin Injury    Patient vital signs are at baseline: No, patient is still requiring oxygen. Upon start of shift, she was on 2L and since then weaned down to 1L and tolerating well.    Patient able to ambulate as they were prior to admission or with assist devices provided by therapies during their stay:  No, patient has not wanted to get out of bed during shift.   Patient MUST void prior to discharge: No, schilling is currently in place.   Patient able to tolerate oral intake:  Yes, although still refusing to eat.   Pain has adequate pain control using Oral analgesics:  Yes  Does patient have an identified :  Yes  Has goal D/C date and time been discussed with patient:  Yes, discharge to [facility].

## 2024-06-21 NOTE — PROGRESS NOTES
"INFECTIOUS DISEASE FOLLOW UP NOTE    Date: 2024   CHIEF COMPLAINT:   Chief Complaint   Patient presents with    Leg Pain        ASSESSMENT:    RLE cellulitis: following excision of cancer. Slow improvement. Leukocytosis resolved. Still significant pain although improved. Purulent drainage on exam , culture sent, GS with GPC, growing staph aureus. Active issue.   Comorbid conditions affecting immune system: DM, CKD    PLAN:  - continue ceftri  - continue vanc  - follow up susceptibilities on staph aureus  - CRP, SED increasing will repeat imaging to see if abscess-if present needs source control  - continue to elevate legs  - discussed with patient and hospitalist  - further recommendations to follow clinical course  - ID will follow, thanks    Debi Light MD  [facility]   Office Telephone [phone].  Fax [phone]  [employer] paging    ______________________________________________________________________    SUBJECTIVE / INTERVAL HISTORY:   Continued pain, swelling. In right leg. Also left foot painful. Tolerating antibiotics.     ROS: All other systems negative except as listed above.    SH/FH/Habits/PMH reviewed and unchanged.    OBJECTIVE:  /59 (BP Location: Right arm, Patient Position: Semi-Vital's, Cuff Size: Adult Regular)   Pulse 71   Temp 98  F (36.7  C) (Oral)   Resp 14   Ht 1.651 m (5' 5\")   Wt 73.2 kg (161 lb 6.4 oz)   SpO2 96%   BMI 26.86 kg/m       Resp: 14      Vital Signs  Temp: 98.1  F (36.7  C)  Temp src: Oral  Resp: 16  Pulse: 80  Pulse Rate Source: Monitor  BP: (!) 144/65  BP Location: Left arm    Temp (24hrs), Av.4  F (36.9  C), Min:98  F (36.7  C), Max:99.2  F (37.3  C)      GEN: No acute distress.    RESPIRATORY:  Normal breathing pattern.    EXTREMITIES: No edema.  SKIN/HAIR/NAILS: right foot swelling, erythema improves with elevation. Bloody purulent drainage still present today. Slight improvement in foot swelling.   IV:  " "peripheral     Antibiotics:  Ceftri  vanc    Pertinent labs:  No results found for: \"CRP\"   CBC RESULTS:   Recent Labs   Lab Test 06/17/24  0746   WBC 10.3   RBC 3.52*   HGB 10.8*   HCT 33.5*   MCV 95   MCH 30.7   MCHC 32.2   RDW 12.6         Last Comprehensive Metabolic Panel:  Sodium   Date Value Ref Range Status   06/16/2024 138 135 - 145 mmol/L Final     Comment:     Reference intervals for this test were updated on 09/26/2023 to more accurately reflect our healthy population. There may be differences in the flagging of prior results with similar values performed with this method. Interpretation of those prior results can be made in the context of the updated reference intervals.      Potassium   Date Value Ref Range Status   06/16/2024 4.5 3.4 - 5.3 mmol/L Final   04/18/2022 4.7 3.5 - 5.0 mmol/L Final     Chloride   Date Value Ref Range Status   06/16/2024 105 98 - 107 mmol/L Final   04/18/2022 107 98 - 107 mmol/L Final     Carbon Dioxide (CO2)   Date Value Ref Range Status   06/16/2024 21 (L) 22 - 29 mmol/L Final   04/18/2022 22 22 - 31 mmol/L Final     Anion Gap   Date Value Ref Range Status   06/16/2024 12 7 - 15 mmol/L Final   04/18/2022 12 5 - 18 mmol/L Final     Glucose   Date Value Ref Range Status   04/18/2022 142 (H) 70 - 125 mg/dL Final     GLUCOSE BY METER POCT   Date Value Ref Range Status   06/21/2024 151 (H) 70 - 99 mg/dL Final     Urea Nitrogen   Date Value Ref Range Status   06/16/2024 33.7 (H) 8.0 - 23.0 mg/dL Final   04/18/2022 43 (H) 8 - 28 mg/dL Final     Creatinine   Date Value Ref Range Status   06/21/2024 1.31 (H) 0.51 - 0.95 mg/dL Final     GFR Estimate   Date Value Ref Range Status   06/21/2024 40 (L) >60 mL/min/1.73m2 Final     Comment:     eGFR calculated using 2021 CKD-EPI equation.   09/24/2019 9 (L) >60 mL/min/1.73m2 Final     Calcium   Date Value Ref Range Status   06/16/2024 8.9 8.8 - 10.2 mg/dL Final        MICROBIOLOGY DATA:  Personally reviewed.  7-Day Micro Results  "      Collected Updated Procedure Result Status      06/19/2024 1107 06/20/2024 1257 Swab Aerobic Bacterial Culture Routine With Gram Stain [[id number]]   (Abnormal)   Swab from Marcos, Right    Preliminary result Component Value   Culture Culture in progress  [P]     2+ Staphylococcus aureus  [P]    Gram Stain Result 2+ Gram positive cocci  [P]     2+ WBC seen  [P]                         RADIOLOGY:  Personally Reviewed.  No results found for this or any previous visit (from the past 24 hour(s)).    Principal Problem:    Cellulitis of right leg  Active Problems:    HTN (hypertension)    DMII (diabetes mellitus, type 2) (H)    Sepsis, due to unspecified organism, unspecified whether acute organ dysfunction present (H)    Chronic atrial fibrillation (H)    Warfarin anticoagulation    DNR (do not resuscitate)    Skin cancer

## 2024-06-21 NOTE — PLAN OF CARE
"Patient vital signs are at baseline: No,  Reason:  pt has been weaned down to .5L of oxygen. Humidified for comfort.   Patient able to ambulate as they were prior to admission or with assist devices provided by therapies during their stay:  No,  Reason:  pt refused to get OOB, and refused to work with therapies today. Pt stated \"I feel weak today\", \"I am not going to walk until my feet get better, it is too painful\" \"tell that therapy lady that I am not going to get up today, Its too painful\". RN educated pt on the importance of ambulation in the recovery process. Pt continued to refuse. Therapy was updated. RN continuously encouraged pt to get up to the chair. Pt is able to weight shift in bed. RLE is elevated onto 4-5 pillows.   Patient MUST void prior to discharge:  No,  Reason:  [place] in place.   Patient able to tolerate oral intake:  No,  Reason:  pt has had minimal intake and required a lot of encouragement to eat. Pt states that she is not hungry and has gotten used to skipping meals. Pt ate an applesauce for breakfast and lunch along with a glucerna shake and ice chips. Blood sugar has been monitored and corrected with sliding scale and carb counting insulin.   Pain has adequate pain control using Oral analgesics:  Yes  Does patient have an identified :  Yes  Has goal D/C date and time been discussed with patient:  Yes, plan is to discharge to [facility]. Care management asked RN if pt would be able to transport with son. RN stated that pt has been refusing to ambulate and when she has been up to the chair in the previous days, she can only tolerate for a minimal amount of time. Then after the chair and ambulation pt has an increased amount of pain. Required IV medications on 6/20       Problem: Skin or Soft Tissue Infection  Goal: Absence of Infection Signs and Symptoms  Outcome: Progressing  Intervention: Minimize and Manage Infection Progression  Recent Flowsheet Documentation  Taken 6/21/2024 " 0800 by Ronald Alexis, RN  Infection Prevention:   hand hygiene promoted   single patient room provided  Infection Management: aseptic technique maintained  Intervention: Provide Meticulous Infection Site Care  Recent Flowsheet Documentation  Taken 6/21/2024 0800 by Ronald Alexis, RN  Topical Inflammation Care: border recession noted

## 2024-06-22 ENCOUNTER — HOSPITAL ENCOUNTER (INPATIENT)
Facility: CLINIC | Age: 84
LOS: 10 days | Discharge: NURSING, REHAB OR LONG-TERM CARE | DRG: 623 | End: 2024-07-03
Attending: STUDENT IN AN ORGANIZED HEALTH CARE EDUCATION/TRAINING PROGRAM | Admitting: INTERNAL MEDICINE
Payer: COMMERCIAL

## 2024-06-22 ENCOUNTER — APPOINTMENT (OUTPATIENT)
Dept: OCCUPATIONAL THERAPY | Facility: CLINIC | Age: 84
DRG: 862 | End: 2024-06-22
Payer: COMMERCIAL

## 2024-06-22 VITALS
TEMPERATURE: 97.9 F | HEIGHT: 65 IN | BODY MASS INDEX: 26.89 KG/M2 | OXYGEN SATURATION: 95 % | SYSTOLIC BLOOD PRESSURE: 121 MMHG | WEIGHT: 161.4 LBS | RESPIRATION RATE: 20 BRPM | HEART RATE: 82 BPM | DIASTOLIC BLOOD PRESSURE: 60 MMHG

## 2024-06-22 DIAGNOSIS — L02.419 CELLULITIS AND ABSCESS OF LEG: Primary | ICD-10-CM

## 2024-06-22 DIAGNOSIS — L03.119 CELLULITIS AND ABSCESS OF LEG: Primary | ICD-10-CM

## 2024-06-22 LAB
CREAT SERPL-MCNC: 1.35 MG/DL (ref 0.51–0.95)
CRP SERPL-MCNC: 168 MG/L
EGFRCR SERPLBLD CKD-EPI 2021: 39 ML/MIN/1.73M2
ERYTHROCYTE [SEDIMENTATION RATE] IN BLOOD BY WESTERGREN METHOD: 88 MM/HR (ref 0–30)
GLUCOSE BLDC GLUCOMTR-MCNC: 140 MG/DL (ref 70–99)
GLUCOSE BLDC GLUCOMTR-MCNC: 158 MG/DL (ref 70–99)
GLUCOSE BLDC GLUCOMTR-MCNC: 162 MG/DL (ref 70–99)
HGB BLD-MCNC: 10.8 G/DL (ref 11.7–15.7)
HOLD SPECIMEN: NORMAL
INR PPP: 2.48 (ref 0.85–1.15)

## 2024-06-22 PROCEDURE — 250N000011 HC RX IP 250 OP 636: Performed by: HOSPITALIST

## 2024-06-22 PROCEDURE — 99204 OFFICE O/P NEW MOD 45 MIN: CPT | Performed by: INTERNAL MEDICINE

## 2024-06-22 PROCEDURE — 85652 RBC SED RATE AUTOMATED: CPT

## 2024-06-22 PROCEDURE — 99232 SBSQ HOSP IP/OBS MODERATE 35: CPT | Performed by: INTERNAL MEDICINE

## 2024-06-22 PROCEDURE — 250N000011 HC RX IP 250 OP 636: Performed by: FAMILY MEDICINE

## 2024-06-22 PROCEDURE — 85610 PROTHROMBIN TIME: CPT | Performed by: HOSPITALIST

## 2024-06-22 PROCEDURE — 250N000011 HC RX IP 250 OP 636: Mod: JZ | Performed by: INTERNAL MEDICINE

## 2024-06-22 PROCEDURE — 250N000013 HC RX MED GY IP 250 OP 250 PS 637: Performed by: HOSPITALIST

## 2024-06-22 PROCEDURE — 82565 ASSAY OF CREATININE: CPT | Performed by: HOSPITALIST

## 2024-06-22 PROCEDURE — 86140 C-REACTIVE PROTEIN: CPT

## 2024-06-22 PROCEDURE — 250N000012 HC RX MED GY IP 250 OP 636 PS 637: Performed by: FAMILY MEDICINE

## 2024-06-22 PROCEDURE — 85018 HEMOGLOBIN: CPT | Performed by: FAMILY MEDICINE

## 2024-06-22 PROCEDURE — 36415 COLL VENOUS BLD VENIPUNCTURE: CPT | Performed by: HOSPITALIST

## 2024-06-22 PROCEDURE — 97129 THER IVNTJ 1ST 15 MIN: CPT | Mod: GO

## 2024-06-22 PROCEDURE — 99239 HOSP IP/OBS DSCHRG MGMT >30: CPT | Performed by: FAMILY MEDICINE

## 2024-06-22 PROCEDURE — 250N000013 HC RX MED GY IP 250 OP 250 PS 637: Performed by: FAMILY MEDICINE

## 2024-06-22 RX ORDER — ONDANSETRON 4 MG/1
4 TABLET, ORALLY DISINTEGRATING ORAL EVERY 6 HOURS PRN
Status: CANCELLED | OUTPATIENT
Start: 2024-06-22

## 2024-06-22 RX ORDER — AMOXICILLIN 250 MG
2 CAPSULE ORAL 2 TIMES DAILY PRN
Status: DISCONTINUED | OUTPATIENT
Start: 2024-06-22 | End: 2024-07-03 | Stop reason: HOSPADM

## 2024-06-22 RX ORDER — VANCOMYCIN HYDROCHLORIDE 1 G/200ML
1000 INJECTION, SOLUTION INTRAVENOUS EVERY 24 HOURS
Status: DISCONTINUED | OUTPATIENT
Start: 2024-06-23 | End: 2024-06-23

## 2024-06-22 RX ORDER — HYDROMORPHONE HCL IN WATER/PF 6 MG/30 ML
0.2 PATIENT CONTROLLED ANALGESIA SYRINGE INTRAVENOUS
Status: CANCELLED | OUTPATIENT
Start: 2024-06-22

## 2024-06-22 RX ORDER — NICOTINE POLACRILEX 4 MG
15-30 LOZENGE BUCCAL
Status: CANCELLED | OUTPATIENT
Start: 2024-06-22

## 2024-06-22 RX ORDER — METOPROLOL SUCCINATE 25 MG/1
25 TABLET, EXTENDED RELEASE ORAL DAILY
Status: DISCONTINUED | OUTPATIENT
Start: 2024-06-23 | End: 2024-07-03 | Stop reason: HOSPADM

## 2024-06-22 RX ORDER — NALOXONE HYDROCHLORIDE 0.4 MG/ML
0.4 INJECTION, SOLUTION INTRAMUSCULAR; INTRAVENOUS; SUBCUTANEOUS
Status: CANCELLED | OUTPATIENT
Start: 2024-06-22

## 2024-06-22 RX ORDER — AMOXICILLIN 250 MG
1 CAPSULE ORAL 2 TIMES DAILY PRN
Status: DISCONTINUED | OUTPATIENT
Start: 2024-06-22 | End: 2024-07-03 | Stop reason: HOSPADM

## 2024-06-22 RX ORDER — ONDANSETRON 4 MG/1
4 TABLET, ORALLY DISINTEGRATING ORAL EVERY 6 HOURS PRN
Status: DISCONTINUED | OUTPATIENT
Start: 2024-06-22 | End: 2024-06-22 | Stop reason: HOSPADM

## 2024-06-22 RX ORDER — NALOXONE HYDROCHLORIDE 0.4 MG/ML
0.4 INJECTION, SOLUTION INTRAMUSCULAR; INTRAVENOUS; SUBCUTANEOUS
Status: DISCONTINUED | OUTPATIENT
Start: 2024-06-22 | End: 2024-07-03 | Stop reason: HOSPADM

## 2024-06-22 RX ORDER — ACETAMINOPHEN 325 MG/1
650 TABLET ORAL EVERY 6 HOURS PRN
Status: CANCELLED | OUTPATIENT
Start: 2024-06-22

## 2024-06-22 RX ORDER — AMOXICILLIN 250 MG
1 CAPSULE ORAL 2 TIMES DAILY PRN
Status: CANCELLED | OUTPATIENT
Start: 2024-06-22

## 2024-06-22 RX ORDER — NALOXONE HYDROCHLORIDE 0.4 MG/ML
0.2 INJECTION, SOLUTION INTRAMUSCULAR; INTRAVENOUS; SUBCUTANEOUS
Status: DISCONTINUED | OUTPATIENT
Start: 2024-06-22 | End: 2024-07-03 | Stop reason: HOSPADM

## 2024-06-22 RX ORDER — PRAVASTATIN SODIUM 20 MG
40 TABLET ORAL AT BEDTIME
Status: CANCELLED | OUTPATIENT
Start: 2024-06-22

## 2024-06-22 RX ORDER — ONDANSETRON 2 MG/ML
4 INJECTION INTRAMUSCULAR; INTRAVENOUS EVERY 6 HOURS PRN
Status: DISCONTINUED | OUTPATIENT
Start: 2024-06-22 | End: 2024-06-22 | Stop reason: HOSPADM

## 2024-06-22 RX ORDER — AMOXICILLIN 250 MG
2 CAPSULE ORAL 2 TIMES DAILY PRN
Status: CANCELLED | OUTPATIENT
Start: 2024-06-22

## 2024-06-22 RX ORDER — VANCOMYCIN HYDROCHLORIDE 1 G/200ML
1000 INJECTION, SOLUTION INTRAVENOUS EVERY 24 HOURS
Status: CANCELLED | OUTPATIENT
Start: 2024-06-23

## 2024-06-22 RX ORDER — ACETAMINOPHEN 325 MG/1
650 TABLET ORAL EVERY 6 HOURS PRN
Status: DISCONTINUED | OUTPATIENT
Start: 2024-06-22 | End: 2024-07-03 | Stop reason: HOSPADM

## 2024-06-22 RX ORDER — OXYCODONE HYDROCHLORIDE 5 MG/1
5 TABLET ORAL EVERY 4 HOURS PRN
Status: CANCELLED | OUTPATIENT
Start: 2024-06-22

## 2024-06-22 RX ORDER — GLIPIZIDE 5 MG/1
5 TABLET ORAL
Status: DISCONTINUED | OUTPATIENT
Start: 2024-06-22 | End: 2024-06-22 | Stop reason: HOSPADM

## 2024-06-22 RX ORDER — OXYCODONE HYDROCHLORIDE 5 MG/1
5 TABLET ORAL EVERY 4 HOURS PRN
Status: DISCONTINUED | OUTPATIENT
Start: 2024-06-22 | End: 2024-07-03 | Stop reason: HOSPADM

## 2024-06-22 RX ORDER — DEXTROSE MONOHYDRATE 25 G/50ML
25-50 INJECTION, SOLUTION INTRAVENOUS
Status: DISCONTINUED | OUTPATIENT
Start: 2024-06-22 | End: 2024-07-03 | Stop reason: HOSPADM

## 2024-06-22 RX ORDER — CEFTRIAXONE 2 G/1
2 INJECTION, POWDER, FOR SOLUTION INTRAMUSCULAR; INTRAVENOUS EVERY 24 HOURS
Status: DISCONTINUED | OUTPATIENT
Start: 2024-06-23 | End: 2024-06-23

## 2024-06-22 RX ORDER — ONDANSETRON 2 MG/ML
4 INJECTION INTRAMUSCULAR; INTRAVENOUS EVERY 6 HOURS PRN
Status: CANCELLED | OUTPATIENT
Start: 2024-06-22

## 2024-06-22 RX ORDER — NALOXONE HYDROCHLORIDE 0.4 MG/ML
0.2 INJECTION, SOLUTION INTRAMUSCULAR; INTRAVENOUS; SUBCUTANEOUS
Status: CANCELLED | OUTPATIENT
Start: 2024-06-22

## 2024-06-22 RX ORDER — WARFARIN SODIUM 5 MG/1
5 TABLET ORAL
Status: COMPLETED | OUTPATIENT
Start: 2024-06-22 | End: 2024-06-22

## 2024-06-22 RX ORDER — ONDANSETRON 2 MG/ML
4 INJECTION INTRAMUSCULAR; INTRAVENOUS EVERY 6 HOURS PRN
Status: DISCONTINUED | OUTPATIENT
Start: 2024-06-22 | End: 2024-07-03 | Stop reason: HOSPADM

## 2024-06-22 RX ORDER — AMLODIPINE BESYLATE 5 MG/1
5 TABLET ORAL DAILY
Status: DISCONTINUED | OUTPATIENT
Start: 2024-06-23 | End: 2024-07-03 | Stop reason: HOSPADM

## 2024-06-22 RX ORDER — LIDOCAINE 40 MG/G
CREAM TOPICAL
Status: DISCONTINUED | OUTPATIENT
Start: 2024-06-22 | End: 2024-07-03 | Stop reason: HOSPADM

## 2024-06-22 RX ORDER — ONDANSETRON 4 MG/1
4 TABLET, ORALLY DISINTEGRATING ORAL EVERY 6 HOURS PRN
Status: DISCONTINUED | OUTPATIENT
Start: 2024-06-22 | End: 2024-07-03 | Stop reason: HOSPADM

## 2024-06-22 RX ORDER — METHOCARBAMOL 500 MG/1
500 TABLET, FILM COATED ORAL 3 TIMES DAILY PRN
Status: CANCELLED | OUTPATIENT
Start: 2024-06-22

## 2024-06-22 RX ORDER — CEFTRIAXONE 2 G/1
2 INJECTION, POWDER, FOR SOLUTION INTRAMUSCULAR; INTRAVENOUS EVERY 24 HOURS
Status: CANCELLED | OUTPATIENT
Start: 2024-06-23

## 2024-06-22 RX ORDER — METOPROLOL SUCCINATE 25 MG/1
25 TABLET, EXTENDED RELEASE ORAL DAILY
Status: CANCELLED | OUTPATIENT
Start: 2024-06-22

## 2024-06-22 RX ORDER — NICOTINE POLACRILEX 4 MG
15-30 LOZENGE BUCCAL
Status: DISCONTINUED | OUTPATIENT
Start: 2024-06-22 | End: 2024-07-03 | Stop reason: HOSPADM

## 2024-06-22 RX ORDER — HYDROMORPHONE HCL IN WATER/PF 6 MG/30 ML
0.2 PATIENT CONTROLLED ANALGESIA SYRINGE INTRAVENOUS
Status: DISCONTINUED | OUTPATIENT
Start: 2024-06-22 | End: 2024-07-03 | Stop reason: HOSPADM

## 2024-06-22 RX ORDER — METHOCARBAMOL 500 MG/1
500 TABLET, FILM COATED ORAL 3 TIMES DAILY PRN
Status: DISCONTINUED | OUTPATIENT
Start: 2024-06-22 | End: 2024-07-03 | Stop reason: HOSPADM

## 2024-06-22 RX ORDER — LIDOCAINE 40 MG/G
CREAM TOPICAL
Status: CANCELLED | OUTPATIENT
Start: 2024-06-22

## 2024-06-22 RX ORDER — DEXTROSE MONOHYDRATE 25 G/50ML
25-50 INJECTION, SOLUTION INTRAVENOUS
Status: CANCELLED | OUTPATIENT
Start: 2024-06-22

## 2024-06-22 RX ORDER — PRAVASTATIN SODIUM 10 MG
40 TABLET ORAL AT BEDTIME
Status: DISCONTINUED | OUTPATIENT
Start: 2024-06-22 | End: 2024-07-03 | Stop reason: HOSPADM

## 2024-06-22 RX ORDER — AMLODIPINE BESYLATE 5 MG/1
5 TABLET ORAL DAILY
Status: CANCELLED | OUTPATIENT
Start: 2024-06-22

## 2024-06-22 RX ADMIN — INSULIN ASPART 1 UNITS: 100 INJECTION, SOLUTION INTRAVENOUS; SUBCUTANEOUS at 22:11

## 2024-06-22 RX ADMIN — VANCOMYCIN HYDROCHLORIDE 1000 MG: 1 INJECTION, SOLUTION INTRAVENOUS at 10:32

## 2024-06-22 RX ADMIN — PRAVASTATIN SODIUM 40 MG: 10 TABLET ORAL at 22:12

## 2024-06-22 RX ADMIN — METOPROLOL SUCCINATE 25 MG: 25 TABLET, EXTENDED RELEASE ORAL at 08:32

## 2024-06-22 RX ADMIN — AMLODIPINE BESYLATE 5 MG: 5 TABLET ORAL at 08:32

## 2024-06-22 RX ADMIN — CEFTRIAXONE SODIUM 2 G: 2 INJECTION, POWDER, FOR SOLUTION INTRAMUSCULAR; INTRAVENOUS at 13:08

## 2024-06-22 RX ADMIN — OXYCODONE HYDROCHLORIDE 5 MG: 5 TABLET ORAL at 22:39

## 2024-06-22 RX ADMIN — INSULIN GLARGINE 10 UNITS: 100 INJECTION, SOLUTION SUBCUTANEOUS at 22:12

## 2024-06-22 RX ADMIN — ONDANSETRON 4 MG: 4 TABLET, ORALLY DISINTEGRATING ORAL at 09:17

## 2024-06-22 ASSESSMENT — ACTIVITIES OF DAILY LIVING (ADL)
ADLS_ACUITY_SCORE: 32
ADLS_ACUITY_SCORE: 32
ADLS_ACUITY_SCORE: 33
ADLS_ACUITY_SCORE: 32
ADLS_ACUITY_SCORE: 33
ADLS_ACUITY_SCORE: 33
ADLS_ACUITY_SCORE: 32
ADLS_ACUITY_SCORE: 33
ADLS_ACUITY_SCORE: 43
ADLS_ACUITY_SCORE: 32
ADLS_ACUITY_SCORE: 39
ADLS_ACUITY_SCORE: 33
ADLS_ACUITY_SCORE: 32
ADLS_ACUITY_SCORE: 33
ADLS_ACUITY_SCORE: 32
ADLS_ACUITY_SCORE: 32
ADLS_ACUITY_SCORE: 43
ADLS_ACUITY_SCORE: 32
ADLS_ACUITY_SCORE: 33

## 2024-06-22 NOTE — DISCHARGE SUMMARY
"[facility] MEDICINE  DISCHARGE SUMMARY     Primary Care Physician: Samy Suazo  Admission Date: 6/14/2024   Discharge Provider: Nelson Navarrete MD Discharge Date: 6/22/2024    Diet:   Active Diet and Nourishment Order   Procedures    Snacks/Supplements Adult: Glucerna; With Meals    Moderate Consistent Carb (60 g CHO per Meal) Diet     Code Status: No CPR- Do NOT Intubate   Activity: bedrest        Condition at Discharge: Stable     REASON FOR PRESENTATION(See Admission Note for Details)   Redness to right lower leg after procedure    PRINCIPAL & ACTIVE DISCHARGE DIAGNOSES     Principal Problem:    Cellulitis of right leg  Active Problems:    HTN (hypertension)    DMII (diabetes mellitus, type 2) (H)    Sepsis, due to unspecified organism, unspecified whether acute organ dysfunction present (H)    Chronic atrial fibrillation (H)    Warfarin anticoagulation    DNR (do not resuscitate)    Skin cancer    Cellulitis and abscess of leg      Clinically Significant Risk Factors              # Hypoalbuminemia: Lowest albumin = 3.2 g/dL at 6/16/2024  8:56 AM, will monitor as appropriate     # Hypertension: Noted on problem list             # DMII: A1C = 7.0 % (Ref range: <5.7 %) within past 6 months   # Overweight: Estimated body mass index is 26.86 kg/m  as calculated from the following:    Height as of this encounter: 1.651 m (5' 5\").    Weight as of this encounter: 73.2 kg (161 lb 6.4 oz).      # Financial/Environmental Concerns: none         PENDING LABS     Unresulted Labs Ordered in the Past 30 Days of this Admission       No orders found from 5/15/2024 to 6/15/2024.          PROCEDURES ( this hospitalization only)      none    RECOMMENDATIONS TO OUTPATIENT PROVIDER FOR F/U VISIT     Dr. Batres accepting hospitalist.  Dr. Mays accepting orthopedic surgeon.    DISPOSITION     [facility]    SUMMARY OF HOSPITAL COURSE:      Francy Sherman is a 84 year old female with a PMH of " DM2, chronic A-fib, on warfarin, HTN, hyperlipidemia and skin cancer.  6/14/2024 admitted for cellulitis at right leg associated with skin cancer excision 10 days prior to admission.  MRI showed superficial soft tissue edema consistent with cellulitis.  ID and orthopedics consulted.  Given IV vancomycin and ceftriaxone.  Wound culture showed MRSA.  6/21 repeat MRI shows new fluid collection 1.2 x 1 x 2.2 cm with possible communication to the anterior tibialis indicative of tenosynovitis.  CRP increasing.  Orthopedics reconsulted for possible washout.  May need INR correction prior to surgery. Warfarin held- last dose was on 6/21.  [facility].  Accepting providers Dr. Batres hospitalist and Dr. Haque orthopedist.    Hospitalist spoke with Dr. 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 Should attempt to obtain path report on Monday 6/24.        Discharge Medications with Med changes:       Current Facility-Administered Medications:     acetaminophen (TYLENOL) tablet 650 mg, 650 mg, Oral, Q6H PRN, Manuel Lucas MD, 650 mg at 06/19/24 2311    amLODIPine (NORVASC) tablet 5 mg, 5 mg, Oral, Daily, Nelson Navarrete MD, 5 mg at 06/22/24 0832    cefTRIAXone (ROCEPHIN) 2 g vial to attach to  ml bag for ADULTS or NS 50 ml bag for PEDS, 2 g, Intravenous, Q24H, Debi Light MD, 2 g at 06/22/24 1308    glucose gel 15-30 g, 15-30 g, Oral, Q15 Min PRN **OR** dextrose 50 % injection 25-50 mL, 25-50 mL, Intravenous, Q15 Min PRN **OR** glucagon injection 1 mg, 1 mg, Subcutaneous, Q15 Min PRN, Nelson Navarrete MD    [Held by provider] glipiZIDE (GLUCOTROL) tablet 5 mg, 5 mg, Oral, BID AC, Nelson Navarrete MD    HYDROmorphone (DILAUDID) injection 0.2 mg, 0.2 mg, Intravenous, Q2H PRN, Zana Wong MD, 0.2 mg at 06/20/24 1605    insulin aspart (NovoLOG)  injection (RAPID ACTING), 1-7 Units, Subcutaneous, 4x Daily AC & HS, Manuel Lucas MD, 1 Units at 06/22/24 0654    insulin aspart (NovoLOG) injection (RAPID ACTING), , Subcutaneous, TID w/meals, Manuel Lucas MD, 2 Units at 06/21/24 1730    insulin glargine (LANTUS PEN) injection 10 Units, 10 Units, Subcutaneous, At Bedtime, Manuel Lucas MD, 10 Units at 06/21/24 2216    lidocaine (LMX4) cream, , Topical, Q1H PRN, Nelson Navarrete MD    lidocaine 1 % 0.1-1 mL, 0.1-1 mL, Other, Q1H PRN, Nelson Navarrete MD    methocarbamol (ROBAXIN) tablet 500 mg, 500 mg, Oral, TID PRN, Manuel Lucas MD, 500 mg at 06/20/24 1335    metoprolol succinate ER (TOPROL XL) 24 hr tablet 25 mg, 25 mg, Oral, Daily, Nelson Navarrete MD, 25 mg at 06/22/24 0832    naloxone (NARCAN) injection 0.2 mg, 0.2 mg, Intravenous, Q2 Min PRN **OR** naloxone (NARCAN) injection 0.4 mg, 0.4 mg, Intravenous, Q2 Min PRN **OR** naloxone (NARCAN) injection 0.2 mg, 0.2 mg, Intramuscular, Q2 Min PRN **OR** naloxone (NARCAN) injection 0.4 mg, 0.4 mg, Intramuscular, Q2 Min PRN, Manuel Lucas MD    ondansetron (ZOFRAN ODT) ODT tab 4 mg, 4 mg, Oral, Q6H PRN, 4 mg at 06/22/24 0917 **OR** ondansetron (ZOFRAN) injection 4 mg, 4 mg, Intravenous, Q6H PRN, Nelson Navarrete MD    oxyCODONE (ROXICODONE) tablet 5 mg, 5 mg, Oral, Q4H PRN, Zana Wong MD, 5 mg at 06/21/24 2246    Patient is already receiving anticoagulation with heparin, enoxaparin (LOVENOX), warfarin (COUMADIN)  or other anticoagulant medication, , Does not apply, Continuous PRN, Nelson Navarrete MD    pravastatin (PRAVACHOL) tablet 40 mg, 40 mg, Oral, At Bedtime, Manuel Lucas MD, 40 mg at 06/21/24 2216    senna-docusate (SENOKOT-S/PERICOLACE) 8.6-50 MG per tablet 1 tablet, 1 tablet, Oral, BID PRN **OR** senna-docusate (SENOKOT-S/PERICOLACE) 8.6-50 MG per tablet 2 tablet, 2 tablet, Oral, BID PRN, Nelson Navarrete MD    sodium chloride (PF) 0.9% PF flush 3 mL, 3 mL, Intracatheter, q1  min Landon curiel Thomas, MD    sodium chloride (PF) 0.9% PF flush 3 mL, 3 mL, Intracatheter, Q8H, Nelson Navarrete MD, 3 mL at 06/22/24 0904    sodium chloride (PF) 0.9% PF flush 3 mL, 3 mL, Intracatheter, q1 min Landon curiel Thomas, MD, 3 mL at 06/16/24 2117    vancomycin (VANCOCIN) 1,000 mg in NaCl 0.9% 200 mL intermittent infusion, 1,000 mg, Intravenous, Q24H, Manuel Lucas MD, 1,000 mg at 06/22/24 1032    warfarin ANTICOAGULANT (COUMADIN) tablet 5 mg, 5 mg, Oral, ONCE at 18:00, Nelson Navarrete MD    Warfarin Dose Required Daily - Pharmacist Managed, 1 each, Oral, See Admin Instructions, Manuel Lucas MD          Rationale for medication changes:      Antibiotics for cellulitis      Consults     PHARMACY TO DOSE VANCO  PHARMACY TO DOSE VANCO  PHARMACY TO DOSE WARFARIN  CARE MANAGEMENT / SOCIAL WORK IP CONSULT  PHYSICAL THERAPY ADULT IP CONSULT  OCCUPATIONAL THERAPY ADULT IP CONSULT  ORTHOPEDIC SURGERY IP CONSULT  WOUND OSTOMY CONTINENCE NURSE  IP CONSULT  PHARMACY TO DOSE WARFARIN  INFECTIOUS DISEASES IP CONSULT  ORTHOPEDIC SURGERY IP CONSULT  OCCUPATIONAL THERAPY ADULT IP CONSULT      Immunizations given this encounter     Most Recent Immunizations   Administered Date(s) Administered    COVID-19 Bivalent 12+ (Pfizer) 10/24/2022    COVID-19 MONOVALENT 12+ (Pfizer) 11/16/2021    COVID-19 Monovalent 12+ (Pfizer 2022) 04/12/2022           Anticoagulation Information      Recent INR results:   Recent Labs   Lab 06/22/24  0705 06/21/24  0537 06/20/24  0434 06/19/24  0707 06/18/24  0640 06/17/24  0746 06/16/24  0718   INR 2.48* 2.65* 2.66* 2.28* 1.83* 1.93* 2.87*     Warfarin doses (if applicable) or name of other anticoagulant: Warfarin held since 6/22/24 in anticipation of surgery      SIGNIFICANT IMAGING FINDINGS     Results for orders placed or performed during the hospital encounter of 06/14/24   XR Tibia and Fibula Right 2 Views    Impression    IMPRESSION: The right tibia and fibula are intact  without evidence of a fracture. There is no cortical destructive change to suggest osteomyelitis. Soft tissue swelling. No soft tissue gas identified. Degenerative changes right knee. Chondrocalcinosis.    Atherosclerotic vascular calcifications.   XR Ankle Right G/E 3 Views    Impression    IMPRESSION: Normal joint spaces and alignment. There is no evidence of an acute displaced right foot or ankle fracture. Ankle mortise is intact. Soft tissue swelling about the foot and ankle. Bones are demineralized.                   XR Foot Right 2 Views    Impression    IMPRESSION: Normal joint spaces and alignment. There is no evidence of an acute displaced right foot or ankle fracture. Ankle mortise is intact. Soft tissue swelling about the foot and ankle. Bones are demineralized.                   MR Ankle Right w/o Contrast    Impression    IMPRESSION:  1.  Superficial soft tissue edema diffusely, nonspecific, but this may be related to cellulitis. No evidence of abscess or soft tissue gas.  2.  High-grade tear of the anterior talofibular ligament, but no adjacent edema, indicating that this is chronic.  3.  No other abnormality.     MR Tibia Fibula Lower Leg Right wo Contrast    Impression    IMPRESSION:  1.  Open wound anteromedial to the mid shaft of the tibia. There is a small underlying fluid collection which may be an abscess.  2.  Diffuse superficial soft tissue edema.  3.  Diffuse muscle edema which may be due to myositis.  4.  No evidence of osteomyelitis.     XR Shoulder Right G/E 3 Views    Impression    IMPRESSION: No acute fracture or malalignment. Severe end-stage glenohumeral joint degenerative changes with bone-on-bone articulation, bony remodeling, and a large inferior humeral head osteophyte. There is a 1.2 cm intra-articular body superiorly.   Moderate acromioclavicular joint degenerative changes. Osteopenia. Chronic lung changes.   XR Chest Port 1 View    Impression    IMPRESSION: Some stable slight  fibrosis left lung base. No acute new findings. Degenerative change right shoulder.   MR Tibia Fibula Lower Leg Right wo & w Contr    Impression    IMPRESSION:  1.  Open wound redemonstrated anterolateral to the midshaft right tibia with adjacent cellulitis.  2.  Defined subcutaneous fluid collection deep to the wound in the anterior mid right leg soft tissues along the superficial muscle belly of the anterior tibialis, approximately 1.2 x 1.0 x 2.2 cm.  3.  The collection appears to communicate with the anterior tibialis greater than extensor digitorum longus tendon sheaths, concerning for septic tenosynovitis.  4.  Likely anterior compartment myositis right leg near the wound.  5.  No evidence for right tibia or fibula acute osteomyelitis.       No results found for this or any previous visit (from the past 4320 hour(s)).    SIGNIFICANT LABORATORY FINDINGS     Most Recent 3 CBC's:  Recent Labs   Lab Test 06/22/24  0857 06/17/24  0746 06/15/24  0631 06/14/24  0744   WBC  --  10.3 13.9* 17.0*   HGB 10.8* 10.8* 11.4* 12.0   MCV  --  95 94 96   PLT  --  201 162 141*     Most Recent 3 BMP's:  Recent Labs   Lab Test 06/22/24  0835 06/22/24  0705 06/22/24  0633 06/21/24  2215 06/21/24  0632 06/21/24  0537 06/20/24  0615 06/20/24  0434 06/16/24  1128 06/16/24  0856 06/15/24  0721 06/15/24  0631 06/14/24  1259 06/14/24  0744   NA  --   --   --   --   --   --   --   --   --  138  --  138  --  138   POTASSIUM  --   --   --   --   --   --   --   --   --  4.5  --  5.0  --  4.1   CHLORIDE  --   --   --   --   --   --   --   --   --  105  --  105  --  104   CO2  --   --   --   --   --   --   --   --   --  21*  --  23  --  20*   BUN  --   --   --   --   --   --   --   --   --  33.7*  --  32.4*  --  41.2*   CR  --  1.35*  --   --   --  1.31*  --  1.27*   < > 1.26*  --  1.26*  --  1.62*   ANIONGAP  --   --   --   --   --   --   --   --   --  12  --  10  --  14   KIRBY  --   --   --   --   --   --   --   --   --  8.9  --  9.7  --  9.4  "  *  --  162* 186*   < >  --    < >  --    < > 176*   < > 168*  168*   < > 203*    < > = values in this interval not displayed.     Most Recent 2 LFT's:  Recent Labs   Lab Test 06/16/24  0856 06/15/24  0631   AST 50* 99*   ALT 97* 143*   ALKPHOS 115 120   BILITOTAL 0.5 0.4     Most Recent 3 INR's:  Recent Labs   Lab Test 06/22/24  0705 06/21/24  0537 06/20/24  0434   INR 2.48* 2.65* 2.66*     Most Recent TSH and T4:  Recent Labs   Lab Test 04/18/22  1541   TSH 2.22     Most Recent Hemoglobin A1c:  Recent Labs   Lab Test 06/14/24  0744   A1C 7.0*     Most Recent 6 glucoses:  Recent Labs   Lab Test 06/22/24  0835 06/22/24  0633 06/21/24  2215 06/21/24  1704 06/21/24  1239 06/21/24  0632   * 162* 186* 208* 220* 151*     Most Recent ESR & CRP:  Recent Labs   Lab Test 06/22/24  0857   SED 88*   CRPI 168.00*     No results found for: \"CTROPT\"   7-Day Micro Results       Collected Updated Procedure Result Status      06/19/2024 1107 06/21/2024 2247 Swab Aerobic Bacterial Culture Routine With Gram Stain [[id number]]    (Abnormal)   Swab from Marcos, Right    Final result Component Value   Culture 3+ Staphylococcus aureus MRSA   Gram Stain Result 2+ Gram positive cocci    2+ WBC seen        Susceptibility        Staphylococcus aureus MRSA      YECENIA      Clindamycin 0.25 ug/mL Susceptible  [1]       Daptomycin 0.5 ug/mL Susceptible      Doxycycline <=0.5 ug/mL Susceptible      Erythromycin >=8 ug/mL Resistant      Gentamicin <=0.5 ug/mL Susceptible      Linezolid 2 ug/mL Susceptible      Oxacillin >=4 ug/mL Resistant  [2]       Tetracycline <=1 ug/mL Susceptible      Trimethoprim/Sulfamethoxazole <=0.5/9.5 ug/mL Susceptible      Vancomycin 1 ug/mL Susceptible                   [1]  This isolate DOES NOT demonstrate inducible clindamycin resistance in vitro. Clindamycin is susceptible and could be used when indicated, however, erythromycin is resistant and should not be used.     [2]  Oxacillin susceptible " isolates are susceptible to cephalosporins (example: cefazolin and cephalexin) and beta lactam combination agents. Oxacillin resistant isolates are resistant to these agents.               Susceptibility Comments       Staphylococcus aureus MRSA    MRSA requires contact precautions.                         Discharge Orders     No discharge procedures on file.    Examination   Physical Exam   Temp:  [97.9  F (36.6  C)-98  F (36.7  C)] 97.9  F (36.6  C)  Pulse:  [82-90] 82  Resp:  [16-20] 20  BP: (121-148)/(60-67) 121/60  SpO2:  [91 %-93 %] 93 %  Wt Readings from Last 4 Encounters:   06/14/24 73.2 kg (161 lb 6.4 oz)   04/18/22 72.6 kg (160 lb)           Please see EMR for more detailed significant labs, imaging, consultant notes etc.    INelson MD, personally saw the patient today and spent greater than 30 minutes discharging this patient.    Nelson Navarrete MD  [facility]    CC:Samy Suazo

## 2024-06-22 NOTE — PROVIDER NOTIFICATION
Critical event of aerobic bacterial culture from right shin resulted in MRSA.     MD notified of critical event. No new orders obtained as patient is already on Vanco. Precautions initiated.

## 2024-06-22 NOTE — PROGRESS NOTES
"INFECTIOUS DISEASE FOLLOW UP NOTE    Date: 2024   CHIEF COMPLAINT:   Chief Complaint   Patient presents with    Leg Pain        ASSESSMENT:    RLE cellulitis: following excision of cancer. Slow improvement. Leukocytosis resolved. Still significant pain although improved. Purulent drainage on exam , cultures with MRSA. Repeat MRI  with fluid collection and septic tenosynovitis.   Comorbid conditions affecting immune system: DM, CKD    PLAN:  - continue ceftri  - continue vanc  - at this point is on day 9 of appropriate antibiotic therapy with minimal clinical improvement, persistently elevated inflammatory markers and imaging with worsening fluid collection. Favor reconsulting orthopedics to evaluate for source control  - further recommendations to follow clinical course  - ID will follow, thanks    Debi Light MD  [facility]   Office Telephone [phone].  Fax [phone]  [employer] paging    ______________________________________________________________________    SUBJECTIVE / INTERVAL HISTORY:   Pain same. Micro with MRSA. MRI with fluid collection, septic tenosynovitis    ROS: All other systems negative except as listed above.    SH/FH/Habits/PMH reviewed and unchanged.    OBJECTIVE:  /61   Pulse 88   Temp 98  F (36.7  C) (Oral)   Resp 16   Ht 1.651 m (5' 5\")   Wt 73.2 kg (161 lb 6.4 oz)   SpO2 91%   BMI 26.86 kg/m       Resp: 16      Vital Signs  Temp: 98.1  F (36.7  C)  Temp src: Oral  Resp: 16  Pulse: 80  Pulse Rate Source: Monitor  BP: (!) 144/65  BP Location: Left arm    Temp (24hrs), Av.4  F (36.9  C), Min:98  F (36.7  C), Max:99.2  F (37.3  C)      GEN: No acute distress.    RESPIRATORY:  Normal breathing pattern.    EXTREMITIES: No edema.  SKIN/HAIR/NAILS: right foot swelling, erythema improves with elevation. Bloody purulent drainage still present today. Foot is less swollen  IV:  peripheral     Antibiotics:  Ceftri  vanc    Pertinent " "labs:  No results found for: \"CRP\"   CBC RESULTS:   Recent Labs   Lab Test 06/17/24  0746   WBC 10.3   RBC 3.52*   HGB 10.8*   HCT 33.5*   MCV 95   MCH 30.7   MCHC 32.2   RDW 12.6         Last Comprehensive Metabolic Panel:  Sodium   Date Value Ref Range Status   06/16/2024 138 135 - 145 mmol/L Final     Comment:     Reference intervals for this test were updated on 09/26/2023 to more accurately reflect our healthy population. There may be differences in the flagging of prior results with similar values performed with this method. Interpretation of those prior results can be made in the context of the updated reference intervals.      Potassium   Date Value Ref Range Status   06/16/2024 4.5 3.4 - 5.3 mmol/L Final   04/18/2022 4.7 3.5 - 5.0 mmol/L Final     Chloride   Date Value Ref Range Status   06/16/2024 105 98 - 107 mmol/L Final   04/18/2022 107 98 - 107 mmol/L Final     Carbon Dioxide (CO2)   Date Value Ref Range Status   06/16/2024 21 (L) 22 - 29 mmol/L Final   04/18/2022 22 22 - 31 mmol/L Final     Anion Gap   Date Value Ref Range Status   06/16/2024 12 7 - 15 mmol/L Final   04/18/2022 12 5 - 18 mmol/L Final     Glucose   Date Value Ref Range Status   04/18/2022 142 (H) 70 - 125 mg/dL Final     GLUCOSE BY METER POCT   Date Value Ref Range Status   06/22/2024 158 (H) 70 - 99 mg/dL Final     Urea Nitrogen   Date Value Ref Range Status   06/16/2024 33.7 (H) 8.0 - 23.0 mg/dL Final   04/18/2022 43 (H) 8 - 28 mg/dL Final     Creatinine   Date Value Ref Range Status   06/22/2024 1.35 (H) 0.51 - 0.95 mg/dL Final     GFR Estimate   Date Value Ref Range Status   06/22/2024 39 (L) >60 mL/min/1.73m2 Final     Comment:     eGFR calculated using 2021 CKD-EPI equation.   09/24/2019 9 (L) >60 mL/min/1.73m2 Final     Calcium   Date Value Ref Range Status   06/16/2024 8.9 8.8 - 10.2 mg/dL Final        MICROBIOLOGY DATA:  Personally reviewed.  7-Day Micro Results       Collected Updated Procedure Result Status      " 06/19/2024 1107 06/21/2024 2247 Swab Aerobic Bacterial Culture Routine With Gram Stain [[id number]]    (Abnormal)   Swab from Marcos, Right    Final result Component Value   Culture 3+ Staphylococcus aureus MRSA   Gram Stain Result 2+ Gram positive cocci    2+ WBC seen        Susceptibility        Staphylococcus aureus MRSA      YECENIA      Ciprofloxacin >=8 ug/mL Resistant  [*]       Clindamycin 0.25 ug/mL Susceptible  [1]       Daptomycin 0.5 ug/mL Susceptible      Doxycycline <=0.5 ug/mL Susceptible      Erythromycin >=8 ug/mL Resistant      Gentamicin <=0.5 ug/mL Susceptible      Inducible macrolide resistance test Negative ug/mL Negative  [*]       Levofloxacin 4 ug/mL Resistant  [*]       Linezolid 2 ug/mL Susceptible      Nitrofurantoin 32 ug/mL Susceptible  [*]       Oxacillin >=4 ug/mL Resistant  [2]       Rifampin <=0.5 ug/mL Susceptible  [*]       Tetracycline <=1 ug/mL Susceptible      Tigecycline <=0.12 ug/mL Susceptible  [*]       Trimethoprim/Sulfamethoxazole <=0.5/9.5 ug/mL Susceptible      Vancomycin 1 ug/mL Susceptible                   [*]  Suppressed Antibiotic     [1]  This isolate DOES NOT demonstrate inducible clindamycin resistance in vitro. Clindamycin is susceptible and could be used when indicated, however, erythromycin is resistant and should not be used.     [2]  Oxacillin susceptible isolates are susceptible to cephalosporins (example: cefazolin and cephalexin) and beta lactam combination agents. Oxacillin resistant isolates are resistant to these agents.               Susceptibility Comments       Staphylococcus aureus MRSA    MRSA requires contact precautions.                        RADIOLOGY:  Personally Reviewed.  No results found for this or any previous visit (from the past 24 hour(s)).    Principal Problem:    Cellulitis of right leg  Active Problems:    HTN (hypertension)    DMII (diabetes mellitus, type 2) (H)    Sepsis, due to unspecified organism, unspecified whether acute organ  dysfunction present (H)    Chronic atrial fibrillation (H)    Warfarin anticoagulation    DNR (do not resuscitate)    Skin cancer

## 2024-06-22 NOTE — CARE PLAN
"Transfer Type: [facility]  Transfer Triage Note    Date of call: 06/22/24  Time of call: 12:03 PM    Current Patient Location:  [facility] Inpatient  Current Level of Care: Med Surg    Vitals: Temp: 98  F (36.7  C) Temp src: Oral BP: 136/61 Pulse: 88   Resp: 16 SpO2: 91 % Height: 165.1 cm (5' 5\") Weight: 73.2 kg (161 lb 6.4 oz)  O2 Device: None (Room air) at Oxygen Delivery: 1 LPM  Diagnosis: Tenosynovitis  Reason for requested transfer: Further diagnostic work up, management, and consultation for specialized care   Isolation Needs: Contact    Care everywhere has been updated and reviewed: Yes  Necessary images have been sent through PACS: Yes    If patient is transferring for specialty care or specific procedure, the specialist required has participated in the transfer call and agreed with need for transfer and anticipated timeline: Yes, Provider name: Orthopaedics specialty with: Dr. Mays    Transfer accepted: Yes  Stability of Patient: Patient is vitally stable, with no critical labs, and will likely remain stable throughout the transfer process  Is the patient appropriate for [facility]? 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 This lichen planus and SCC of skin was resected by dermatology. ID and orthopaedics consulted. Culture grew MRSA with no improvement on appropriate antibiotics (vancomycin and ceftriaxone). Repeat MRI on 6/21 demonstrated an anterior tibilas fluid collection concerning for tenosynovitis. Local orthopaedics at [facility] recommended transfer to [facility] to seek evaluation with " musculoskeletal oncology. Dr. Mays from orthopaedics discussed with [facility] team and patient accepted to [facility] for medicine admission with orthopaedic surgery consultation on [facility].     Deejay Batres MD

## 2024-06-22 NOTE — LETTER
Health Information Management Services               Recipient:  [facility]          Sender:  SANCHEZ Cabrera  [phone]          Date: July 3, 2024  Patient Name:  Francy Sherman  Patient YOB: 1940  Routing Message:  [employer] orders for B. O.           The documents accompanying this notice contain confidential information belonging to the sender.  This information is intended only for the use of the individual or entity named above.  The authorized recipient of this information is prohibited from disclosing this information to any other party and is required to destroy the information after its stated need has been fulfilled, unless otherwise required by state law.      If you are not the intended recipient, you are hereby notified that any disclosure, copy, distribution or action taken in reliance on the contents of these documents is strictly prohibited.  If you have received this document in error, please return it by fax to [phone] with a note on the cover sheet explaining why you are returning it (e.g. not your patient, not your provider, etc.).  If you need further assistance, please call [facility] Centralized Transcription at [phone].  Documents may also be returned by mail to [employer], , [facility] [address], [place], [place] [address].

## 2024-06-22 NOTE — PLAN OF CARE
Problem: Pain Acute  Goal: Optimal Pain Control and Function  Outcome: Progressing  Intervention: Develop Pain Management Plan  Recent Flowsheet Documentation  Taken 6/21/2024 1742 by Susy Kearns, RN  Pain Management Interventions:   medication (see MAR)   distraction   emotional support   relaxation techniques promoted   repositioned   rest  Intervention: Prevent or Manage Pain  Recent Flowsheet Documentation  Taken 6/21/2024 1700 by Susy Kearns, RN  Sensory Stimulation Regulation:   care clustered   lighting decreased   quiet environment promoted  Sleep/Rest Enhancement:   awakenings minimized   comfort measures   natural light exposure provided   relaxation techniques promoted   regular sleep/rest pattern promoted  Bowel Elimination Promotion: ambulation promoted  Complementary Therapy: music therapy provided  Medication Review/Management: medications reviewed  Intervention: Optimize Psychosocial Wellbeing  Recent Flowsheet Documentation  Taken 6/21/2024 1700 by Susy Kearns, RN  Supportive Measures:   active listening utilized   verbalization of feelings encouraged   self-care encouraged   relaxation techniques promoted   Goal Outcome Evaluation:  Pt moved to edge of bed with Rn, assist of stand by. Pt refusing to ambulate.     Pt has MRI at 2045, check list complete and sent to MRI, MRI notified.     Pt pain controlled via po pain medications, elevation.     RN updated  at bedside.     Schilling patent, WDL output. Pt refuses to remove brief in bed with schilling in place, education provided about infection prevention and skin sores.

## 2024-06-22 NOTE — PROVIDER NOTIFICATION
1150 Dermatology consults paged for pathology results.     1203 Dr. Navarrete updated with Dr. Kumar's direct number to communicate lab results.

## 2024-06-22 NOTE — LETTER
Transition Communication Hand-off for Care Transitions to Next Level of Care Provider    Name: Francy Sherman  : 1940  MRN #: [id number]  Primary Care Provider: DAMON REA     Primary Clinic: [address] [place] [id number]     Reason for Hospitalization:  septic tenosynovitis  Abscess  Admit Date/Time: 2024  8:19 PM  Discharge Date: 24  Payor Source: Payor: MEDICA / Plan: MED[employer] / Product Type: HMO /            Reason for Communication Hand-off Referral: Continuity of Care    Discharge Plan:  Pt will discharge to TCU:    [facility]  [address] [place] [address]  PH: [phone]  Fax: [phone]    Will need PCP follow-up upon discharge from TCU       Concern for non-adherence with plan of care:   Y/N No  Discharge Needs Assessment:  Needs      Flowsheet Row Most Recent Value   Equipment Currently Used at Home cane, straight, walker, rolling  [Has access to FWW at home]   # of Referrals Placed by CM Post Acute Facilities  [see CHW note for referral status]            Already enrolled in Tele-monitoring program and name of program:  No  Follow-up specialty is recommended: Yes - Pt elected to set-up her own OP wound clinic follow-up.    Follow-up plan:    Future Appointments   Date Time Provider Department Center   2024  4:30 PM Pamela Beal MD [facility]       Any outstanding tests or procedures:        Referrals       Future Labs/Procedures    Occupational Therapy Adult Consult     Comments:    Evaluate and treat as clinically indicated.    Reason:   Weakness and deconditioning    Physical Therapy Adult Consult     Comments:    Evaluate and treat as clinically indicated.    Reason:  Weakness and deconditioning              Key Recommendations:      SANCHEZ SEALS    AVS/Discharge Summary is the source of truth; this is a helpful guide for improved communication of patient story

## 2024-06-22 NOTE — SIGNIFICANT EVENT
Significant Event Note    Time of event: 12:21 PM June 22, 2024    Description of event:    Nursing staff spoke with Dr. 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    Spoke with Dr. Batres bed control hospitalist at [facility] and Dr. Davon RODRIGUEZ of [facility].  Discussed case and recommendations from [facility] to transfer to [facility].  Unlikely that patient would undergo any procedure today.    Plan:  -Dr. Mays requested that [facility] speak with her directly.  Phone number [phone].  Relayed this message to floor nursing staff will contact [facility].  -If approved, Dr. Mays will contact bed control who will in turn contact [facility] hospitalist regarding transfer to [facility].  -Transitioned diet from n.p.o. to diabetic.  -Continue to hold warfarin in anticipation of upcoming procedure.  -In the meantime I will contact Dr. Kumar from dermatology just to verify information from the nursing staff.    Discussed with: Nursing supervisor and bed control    Nelson Navarerte MD    Addendum 1:02 PM    Spoke with Dr. Kumar who verified the above information.    Nelson Navarrete MD  6/22/2024  1:02 PM

## 2024-06-22 NOTE — PLAN OF CARE
Goal Outcome Evaluation:      Plan of Care Reviewed With: patient    Overall Patient Progress: no changeOverall Patient Progress: no change    On 1 L of O2 via NC, O2 saturation >90 %, Alert and oriented x4, able to make needs known, denied pain at rest, has tenderness in both feet when moving,  RLE elevated with four pillows, redness extended past marked line, CMS intact, dsg CDI, no PIV, SWAT RN and oncoming RN aware, has constipation, but declined PRN stool softeners, denied discomfort and reported passing flatus, contact precaution maintained.

## 2024-06-22 NOTE — PROGRESS NOTES
[facility] MEDICINE  PROGRESS NOTE     Code Status: No CPR- Do NOT Intubate       Identification/Summary:   Francy Sherman is a 84 year old female with a PMH of DM2, chronic A-fib, on warfarin, HTN, hyperlipidemia and skin cancer.  6/14/2024 admitted for cellulitis at right leg associated with skin cancer excision 10 days prior to admission.  MRI showed superficial soft tissue edema consistent with cellulitis.  ID and orthopedics consulted.  Given IV vancomycin and ceftriaxone.  Wound culture showed MRSA.  6/21 repeat MRI shows new fluid collection 1.2 x 1 x 2.2 cm with possible communication to the anterior tibialis indicative of tenosynovitis.  CRP increasing.  Made NPO and orthopedics reconsulted for possible washout.  May need INR correction prior to surgery.     Addendum 11:31 AM    [place] [facility] evaluated patient and due to concerns of wound appearance, infection and associated cancer diagnosis they are recommending transfer to [facility] to be seen by musculoskeletal oncology.    -Spoke with patient and family and they are agreeable to transfer.  -Charge nurse notified to contact bed control.  -Will attempt to obtain pathology report from Dr. Kumar from [facility].  This does not appear to be in our charting as present.    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  Since admission has been receiving vancomycin.  6/18 ceftriaxone was added.  ID and orthopedics consultation appreciated.  6/19 wound culture showing MRSA.  Placed on contact precautions.  6/21 repeat MRI showed fluid collection  1.2 x 1 x 2.2 cm which appears to be communicating with the anterior tibialis indicative of tenosynovitis.  CRP increasing.  6/22 made NPO.  [place] [facility] reconsulted for consideration of possible washout in light of abscess despite receiving appropriate antibiotics.  If proceeding to the OR would need anticoagulation correction.    Chronic kidney disease stage IIIb  Creatinine holding stable at baseline around 1.3.  Follow daily BMP.    Chronic atrial fibrillation  Essential hypertension  Continue home Norvasc and metoprolol.  Ordered hold parameters.    Type 2 diabetes  Admission hemoglobin A1c 7.  Glucose under reasonable control values around 150.  Glipizide held since admission.  Continue home Lantus 10 units nightly.    Hyperlipidemia  Continue home Pravachol.    Mild cognitive impairment  Family notes that patient has been a bit more confused than her usual baseline since admission.  May be related to oxycodone.  OT consulted for slums assessment.    Chronic anticoagulation   Warfarin management per pharmacy.  6/22 INR 2.48.  Hold warfarin at this time until evaluation by orthopedics.  May need reversal agent if going to the OR.    COVID-19 PCR not tested    Fluids: Saline lock  Pain meds: Tylenol and oxycodone  Therapy: OT consulted for slums assessment  [place]:PRESENT, indication: Acute retention or obstruction  Lines: None       Current Diet  Orders Placed This Encounter      NPO for Medical/Clinical Reasons Except for: Meds, Ice Chips    Supplements  Active Nourishment Order   Procedures    Snacks/Supplements Adult: Glucerna; With Meals     Barriers to Discharge: Orthopedic evaluation, right lower extremity abscess, intravenous antibiotics    Disposition: To be determined  Medically Ready for Discharge: Anticipated in 5+ Days       Clinically Significant Risk Factors              # Hypoalbuminemia: Lowest albumin = 3.2 g/dL at 6/16/2024  8:56 AM, will monitor as appropriate     # Hypertension: Noted  "on problem list             # DMII: A1C = 7.0 % (Ref range: <5.7 %) within past 6 months   # Overweight: Estimated body mass index is 26.86 kg/m  as calculated from the following:    Height as of this encounter: 1.651 m (5' 5\").    Weight as of this encounter: 73.2 kg (161 lb 6.4 oz).      # Financial/Environmental Concerns: none         Interval History/Subjective:  Patient clinically feels like things are getting better but is concerned about some swelling in her left foot.  No chest pain.  No shortness of breath.  No nausea or vomiting.  No lightheadedness or dizziness.  Patient was somewhat forgetful.  Questions answered to verbalized satisfaction.      Last 24H PRN:     ondansetron (ZOFRAN ODT) ODT tab 4 mg, 4 mg at 06/22/24 0917 **OR** ondansetron (ZOFRAN) injection 4 mg    oxyCODONE (ROXICODONE) tablet 5 mg, 5 mg at 06/21/24 0496    Physical Exam/Objective:  Temp:  [98  F (36.7  C)-98.5  F (36.9  C)] 98  F (36.7  C)  Pulse:  [84-90] 88  Resp:  [16] 16  BP: (130-148)/(61-67) 136/61  SpO2:  [91 %-93 %] 91 %  Wt Readings from Last 4 Encounters:   06/14/24 73.2 kg (161 lb 6.4 oz)   04/18/22 72.6 kg (160 lb)     Body mass index is 26.86 kg/m .    Constitutional: awake, alert, cooperative, no apparent distress, and appears stated age  ENT: Normocephalic, without obvious abnormality, atraumatic, external ears without lesions, oral pharynx with moist mucous membranes, tonsils without erythema or exudates, gums normal and good dentition.  Respiratory: No increased work of breathing, good air exchange, clear to auscultation bilaterally, no crackles or wheezing  Cardiovascular: Normal apical impulse, regular rate and rhythm, normal S1 and S2, no S3 or S4, and no murmur noted  GI: No scars, normal bowel sounds, soft, non-distended, non-tender, no masses palpated, no hepatosplenomegally  Skin: Anterior right shin dressing left intact.  Significant erythema around surrounding area.  Otherwise normal skin color, texture, " turgor, no redness, warmth, or swelling, and no rashes  Musculoskeletal: Patient has redness and swelling to right anterior shin as described above.  Trace edema to left lower extremity otherwise extremity exam unremarkable.   Neurologic: Cranial nerves II-XII are grossly intact. Sensory:  Sensory intact  Neuropsychiatric: General: restless and normal eye contact Level of consciousness: alert / normal Affect: anxious Orientation: oriented to self and place Memory and insight: impaired: Mild      Medications:   Personally Reviewed.  Medications   Current Facility-Administered Medications   Medication Dose Route Frequency Provider Last Rate Last Admin    Patient is already receiving anticoagulation with heparin, enoxaparin (LOVENOX), warfarin (COUMADIN)  or other anticoagulant medication   Does not apply Continuous PRN Manuel Lucas MD         Current Facility-Administered Medications   Medication Dose Route Frequency Provider Last Rate Last Admin    amLODIPine (NORVASC) tablet 5 mg  5 mg Oral Daily Manuel Lucas MD   5 mg at 06/22/24 0832    cefTRIAXone (ROCEPHIN) 2 g vial to attach to  ml bag for ADULTS or NS 50 ml bag for PEDS  2 g Intravenous Q24H Debi Light MD   2 g at 06/21/24 1251    insulin aspart (NovoLOG) injection (RAPID ACTING)  1-7 Units Subcutaneous 4x Daily AC & HS Manuel Lucas MD   1 Units at 06/22/24 0654    insulin aspart (NovoLOG) injection (RAPID ACTING)   Subcutaneous TID w/meals Manuel Lucas MD   2 Units at 06/21/24 1730    insulin glargine (LANTUS PEN) injection 10 Units  10 Units Subcutaneous At Bedtime Manuel Lucas MD   10 Units at 06/21/24 2216    metoprolol succinate ER (TOPROL XL) 24 hr tablet 25 mg  25 mg Oral Daily Manuel Lucas MD   25 mg at 06/22/24 0832    pravastatin (PRAVACHOL) tablet 40 mg  40 mg Oral At Bedtime Manuel Lucas MD   40 mg at 06/21/24 2216    sodium chloride (PF) 0.9% PF flush 3 mL  3 mL Intracatheter Q8H Manuel Lucas MD   3 mL  at 06/22/24 0904    vancomycin (VANCOCIN) 1,000 mg in NaCl 0.9% 200 mL intermittent infusion  1,000 mg Intravenous Q24H Manuel Lucas MD   1,000 mg at 06/21/24 0935    Warfarin Dose Required Daily - Pharmacist Managed  1 each Oral See Admin Instructions Manuel Lucas MD           Data reviewed today: I personally reviewed all new medications, labs, imaging/diagnostics reports over the past 24 hours. Pertinent findings include:    Imaging:   Recent Results (from the past 24 hour(s))   MR Tibia Fibula Lower Leg Right wo & w Contr    Narrative    EXAM: MR TIBIA FIBULA LOWER LEG RIGHT W/O and W CONTR  LOCATION: [facility]  DATE: 6/21/2024    INDICATION: Recent biopsy, no improvement with antibiotics. Evaluate for abscess.  COMPARISON: 6/15/2024.  TECHNIQUE: Routine. Additional postgadolinium T1 sequences were obtained.  IV CONTRAST: Gadavist 7.5 mL.    FINDINGS:     BONES:   -No fracture, bone contusion, or stress reaction. No concerning marrow replacing lesion. No MR finding for acute tibia or fibula osteomyelitis.    SOFT TISSUES:    -Redemonstrated is an open wound along the anterior mid tibia anterolateral to the corresponding tibial shaft as best seen on image 50 of series 10. Adjacent cellulitis. There is a small deep fluid collection compatible with an abscess as seen best on   images 48-51 measuring approximately 1.2 cm transverse x 1.0 cm anteroposterior x approximately 2.2 cm craniocaudal. The collection appears to communicate with the anterior tibialis and extensor digitorum longus tendon sheaths particularly the anterior   tibialis compatible with septic tenosynovitis. There is likely myositis of both the corresponding anterior tibialis and extensor digitorum longus musculature in the anterior compartment right leg. Sizable knee joint effusion on the right with synovitis.   Knee arthrosis on both sides. Intrinsic right calf muscle bulk is remarkable for some fatty infiltration  fairly diffusely but no significant muscle atrophy. Generalized right leg soft tissue swelling.    Images of the contralateral left tibia and fibula demonstrate no fracture, stress fracture or bone lesion. No acute osteomyelitis. No acute left calf muscle strain or significant muscle edema. No significant left leg soft tissue swelling.      Impression    IMPRESSION:  1.  Open wound redemonstrated anterolateral to the midshaft right tibia with adjacent cellulitis.  2.  Defined subcutaneous fluid collection deep to the wound in the anterior mid right leg soft tissues along the superficial muscle belly of the anterior tibialis, approximately 1.2 x 1.0 x 2.2 cm.  3.  The collection appears to communicate with the anterior tibialis greater than extensor digitorum longus tendon sheaths, concerning for septic tenosynovitis.  4.  Likely anterior compartment myositis right leg near the wound.  5.  No evidence for right tibia or fibula acute osteomyelitis.       Labs:  MR Tibia Fibula Lower Leg Right wo & w Contr   Final Result   IMPRESSION:   1.  Open wound redemonstrated anterolateral to the midshaft right tibia with adjacent cellulitis.   2.  Defined subcutaneous fluid collection deep to the wound in the anterior mid right leg soft tissues along the superficial muscle belly of the anterior tibialis, approximately 1.2 x 1.0 x 2.2 cm.   3.  The collection appears to communicate with the anterior tibialis greater than extensor digitorum longus tendon sheaths, concerning for septic tenosynovitis.   4.  Likely anterior compartment myositis right leg near the wound.   5.  No evidence for right tibia or fibula acute osteomyelitis.      XR Chest Port 1 View   Final Result   IMPRESSION: Some stable slight fibrosis left lung base. No acute new findings. Degenerative change right shoulder.      XR Shoulder Right G/E 3 Views   Final Result   IMPRESSION: No acute fracture or malalignment. Severe end-stage glenohumeral joint  degenerative changes with bone-on-bone articulation, bony remodeling, and a large inferior humeral head osteophyte. There is a 1.2 cm intra-articular body superiorly.    Moderate acromioclavicular joint degenerative changes. Osteopenia. Chronic lung changes.      MR Tibia Fibula Lower Leg Right wo Contrast   Final Result   IMPRESSION:   1.  Open wound anteromedial to the mid shaft of the tibia. There is a small underlying fluid collection which may be an abscess.   2.  Diffuse superficial soft tissue edema.   3.  Diffuse muscle edema which may be due to myositis.   4.  No evidence of osteomyelitis.         MR Ankle Right w/o Contrast   Final Result   IMPRESSION:   1.  Superficial soft tissue edema diffusely, nonspecific, but this may be related to cellulitis. No evidence of abscess or soft tissue gas.   2.  High-grade tear of the anterior talofibular ligament, but no adjacent edema, indicating that this is chronic.   3.  No other abnormality.         XR Foot Right 2 Views   Final Result   IMPRESSION: Normal joint spaces and alignment. There is no evidence of an acute displaced right foot or ankle fracture. Ankle mortise is intact. Soft tissue swelling about the foot and ankle. Bones are demineralized.                           XR Ankle Right G/E 3 Views   Final Result   IMPRESSION: Normal joint spaces and alignment. There is no evidence of an acute displaced right foot or ankle fracture. Ankle mortise is intact. Soft tissue swelling about the foot and ankle. Bones are demineralized.                           XR Tibia and Fibula Right 2 Views   Final Result   IMPRESSION: The right tibia and fibula are intact without evidence of a fracture. There is no cortical destructive change to suggest osteomyelitis. Soft tissue swelling. No soft tissue gas identified. Degenerative changes right knee. Chondrocalcinosis.      Atherosclerotic vascular calcifications.        Recent Results (from the past 24 hour(s))   Glucose by  meter    Collection Time: 06/21/24 12:39 PM   Result Value Ref Range    GLUCOSE BY METER POCT 220 (H) 70 - 99 mg/dL   Glucose by meter    Collection Time: 06/21/24  5:04 PM   Result Value Ref Range    GLUCOSE BY METER POCT 208 (H) 70 - 99 mg/dL   Glucose by meter    Collection Time: 06/21/24 10:15 PM   Result Value Ref Range    GLUCOSE BY METER POCT 186 (H) 70 - 99 mg/dL   Glucose by meter    Collection Time: 06/22/24  6:33 AM   Result Value Ref Range    GLUCOSE BY METER POCT 162 (H) 70 - 99 mg/dL   INR    Collection Time: 06/22/24  7:05 AM   Result Value Ref Range    INR 2.48 (H) 0.85 - 1.15   Creatinine    Collection Time: 06/22/24  7:05 AM   Result Value Ref Range    Creatinine 1.35 (H) 0.51 - 0.95 mg/dL    GFR Estimate 39 (L) >60 mL/min/1.73m2   Glucose by meter    Collection Time: 06/22/24  8:35 AM   Result Value Ref Range    GLUCOSE BY METER POCT 158 (H) 70 - 99 mg/dL   Hemoglobin    Collection Time: 06/22/24  8:57 AM   Result Value Ref Range    Hemoglobin 10.8 (L) 11.7 - 15.7 g/dL   CRP inflammation    Collection Time: 06/22/24  8:57 AM   Result Value Ref Range    CRP Inflammation 168.00 (H) <5.00 mg/L       Pending Labs:  Unresulted Labs Ordered in the Past 30 Days of this Admission       Date and Time Order Name Status Description    6/22/2024  8:31 AM Erythrocyte sedimentation rate auto In process               Nelson Navarrete MD  [facility]  Phone: #[phone]

## 2024-06-23 PROBLEM — L02.91 ABSCESS: Status: ACTIVE | Noted: 2024-06-23

## 2024-06-23 LAB
BASOPHILS # BLD AUTO: 0.1 10E3/UL (ref 0–0.2)
BASOPHILS NFR BLD AUTO: 1 %
CK SERPL-CCNC: 25 U/L (ref 26–192)
CREAT SERPL-MCNC: 1.54 MG/DL (ref 0.51–0.95)
EGFRCR SERPLBLD CKD-EPI 2021: 33 ML/MIN/1.73M2
EOSINOPHIL # BLD AUTO: 0.2 10E3/UL (ref 0–0.7)
EOSINOPHIL NFR BLD AUTO: 1 %
GLUCOSE BLDC GLUCOMTR-MCNC: 118 MG/DL (ref 70–99)
GLUCOSE BLDC GLUCOMTR-MCNC: 128 MG/DL (ref 70–99)
GLUCOSE BLDC GLUCOMTR-MCNC: 135 MG/DL (ref 70–99)
GLUCOSE BLDC GLUCOMTR-MCNC: 137 MG/DL (ref 70–99)
GLUCOSE BLDC GLUCOMTR-MCNC: 151 MG/DL (ref 70–99)
HOLD SPECIMEN: NORMAL
IMM GRANULOCYTES # BLD: 0.1 10E3/UL
IMM GRANULOCYTES NFR BLD: 1 %
INR PPP: 2.98 (ref 0.85–1.15)
LYMPHOCYTES # BLD AUTO: 1.1 10E3/UL (ref 0.8–5.3)
LYMPHOCYTES NFR BLD AUTO: 7 %
MONOCYTES # BLD AUTO: 2.2 10E3/UL (ref 0–1.3)
MONOCYTES NFR BLD AUTO: 13 %
NEUTROPHILS # BLD AUTO: 13 10E3/UL (ref 1.6–8.3)
NEUTROPHILS NFR BLD AUTO: 77 %
NRBC # BLD AUTO: 0 10E3/UL
NRBC BLD AUTO-RTO: 0 /100
VANCOMYCIN SERPL-MCNC: 20.6 UG/ML
WBC # BLD AUTO: 16.6 10E3/UL (ref 4–11)

## 2024-06-23 PROCEDURE — 99207 PR NO BILLABLE SERVICE THIS VISIT: CPT | Performed by: ORTHOPAEDIC SURGERY

## 2024-06-23 PROCEDURE — 99222 1ST HOSP IP/OBS MODERATE 55: CPT | Performed by: STUDENT IN AN ORGANIZED HEALTH CARE EDUCATION/TRAINING PROGRAM

## 2024-06-23 PROCEDURE — 250N000013 HC RX MED GY IP 250 OP 250 PS 637: Performed by: FAMILY MEDICINE

## 2024-06-23 PROCEDURE — 80202 ASSAY OF VANCOMYCIN: CPT | Performed by: STUDENT IN AN ORGANIZED HEALTH CARE EDUCATION/TRAINING PROGRAM

## 2024-06-23 PROCEDURE — 120N000002 HC R&B MED SURG/OB UMMC

## 2024-06-23 PROCEDURE — 85048 AUTOMATED LEUKOCYTE COUNT: CPT | Performed by: STUDENT IN AN ORGANIZED HEALTH CARE EDUCATION/TRAINING PROGRAM

## 2024-06-23 PROCEDURE — 82550 ASSAY OF CK (CPK): CPT | Performed by: INTERNAL MEDICINE

## 2024-06-23 PROCEDURE — 258N000003 HC RX IP 258 OP 636: Mod: JZ | Performed by: STUDENT IN AN ORGANIZED HEALTH CARE EDUCATION/TRAINING PROGRAM

## 2024-06-23 PROCEDURE — 99233 SBSQ HOSP IP/OBS HIGH 50: CPT | Performed by: INTERNAL MEDICINE

## 2024-06-23 PROCEDURE — 250N000011 HC RX IP 250 OP 636: Mod: JZ | Performed by: STUDENT IN AN ORGANIZED HEALTH CARE EDUCATION/TRAINING PROGRAM

## 2024-06-23 PROCEDURE — 85610 PROTHROMBIN TIME: CPT | Performed by: FAMILY MEDICINE

## 2024-06-23 PROCEDURE — 82962 GLUCOSE BLOOD TEST: CPT

## 2024-06-23 PROCEDURE — 250N000011 HC RX IP 250 OP 636: Mod: JZ | Performed by: FAMILY MEDICINE

## 2024-06-23 PROCEDURE — 36415 COLL VENOUS BLD VENIPUNCTURE: CPT | Performed by: STUDENT IN AN ORGANIZED HEALTH CARE EDUCATION/TRAINING PROGRAM

## 2024-06-23 PROCEDURE — 250N000011 HC RX IP 250 OP 636: Performed by: STUDENT IN AN ORGANIZED HEALTH CARE EDUCATION/TRAINING PROGRAM

## 2024-06-23 PROCEDURE — 82565 ASSAY OF CREATININE: CPT | Performed by: STUDENT IN AN ORGANIZED HEALTH CARE EDUCATION/TRAINING PROGRAM

## 2024-06-23 RX ORDER — CEFEPIME HYDROCHLORIDE 2 G/1
2 INJECTION, POWDER, FOR SOLUTION INTRAVENOUS EVERY 24 HOURS
Status: DISCONTINUED | OUTPATIENT
Start: 2024-06-23 | End: 2024-06-23

## 2024-06-23 RX ORDER — PIPERACILLIN SODIUM, TAZOBACTAM SODIUM 3; .375 G/15ML; G/15ML
3.38 INJECTION, POWDER, LYOPHILIZED, FOR SOLUTION INTRAVENOUS EVERY 6 HOURS
Status: DISCONTINUED | OUTPATIENT
Start: 2024-06-23 | End: 2024-06-25

## 2024-06-23 RX ADMIN — CEFTRIAXONE SODIUM 2 G: 2 INJECTION, POWDER, FOR SOLUTION INTRAMUSCULAR; INTRAVENOUS at 12:29

## 2024-06-23 RX ADMIN — OXYCODONE HYDROCHLORIDE 5 MG: 5 TABLET ORAL at 21:52

## 2024-06-23 RX ADMIN — SENNOSIDES AND DOCUSATE SODIUM 2 TABLET: 50; 8.6 TABLET ORAL at 12:28

## 2024-06-23 RX ADMIN — AMLODIPINE BESYLATE 5 MG: 5 TABLET ORAL at 09:35

## 2024-06-23 RX ADMIN — VANCOMYCIN HYDROCHLORIDE 750 MG: 1 INJECTION, POWDER, LYOPHILIZED, FOR SOLUTION INTRAVENOUS at 10:37

## 2024-06-23 RX ADMIN — METOPROLOL SUCCINATE 25 MG: 25 TABLET, FILM COATED, EXTENDED RELEASE ORAL at 09:35

## 2024-06-23 RX ADMIN — ACETAMINOPHEN 650 MG: 325 TABLET, FILM COATED ORAL at 18:01

## 2024-06-23 RX ADMIN — PIPERACILLIN AND TAZOBACTAM 3.38 G: 3; .375 INJECTION, POWDER, LYOPHILIZED, FOR SOLUTION INTRAVENOUS at 17:45

## 2024-06-23 RX ADMIN — INSULIN ASPART 1 UNITS: 100 INJECTION, SOLUTION INTRAVENOUS; SUBCUTANEOUS at 17:59

## 2024-06-23 RX ADMIN — INSULIN GLARGINE 10 UNITS: 100 INJECTION, SOLUTION SUBCUTANEOUS at 22:26

## 2024-06-23 RX ADMIN — PRAVASTATIN SODIUM 40 MG: 10 TABLET ORAL at 21:52

## 2024-06-23 RX ADMIN — OXYCODONE HYDROCHLORIDE 5 MG: 5 TABLET ORAL at 12:28

## 2024-06-23 ASSESSMENT — ACTIVITIES OF DAILY LIVING (ADL)
ADLS_ACUITY_SCORE: 43
ADLS_ACUITY_SCORE: 44
ADLS_ACUITY_SCORE: 43
ADLS_ACUITY_SCORE: 44
ADLS_ACUITY_SCORE: 43
ADLS_ACUITY_SCORE: 43
ADLS_ACUITY_SCORE: 44
ADLS_ACUITY_SCORE: 43
DEPENDENT_IADLS:: INDEPENDENT
ADLS_ACUITY_SCORE: 44
ADLS_ACUITY_SCORE: 43
ADLS_ACUITY_SCORE: 44
ADLS_ACUITY_SCORE: 43
ADLS_ACUITY_SCORE: 44
ADLS_ACUITY_SCORE: 43
ADLS_ACUITY_SCORE: 43
ADLS_ACUITY_SCORE: 44

## 2024-06-23 NOTE — CONSULTS
"  [facility] GENERAL ID SERVICE: NEW CONSULTATION  Patient:  Francy Sherman, Date of birth 1940, Medical record number [id number]  Date of Admission: 6/22/2024  Date of Visit:  6/23/2024  Requesting Provider: Deejay Batres  Reason for consult: \"MRSA lower ext abscess\"         Assessment and Recommendations:   Problem List:  - RLE cellulitis  - Non healing anterior shin ulcer post recent biopsies   - MRI w fluid collection 1.2 x 1 x 2.2 cm which appearsed to be communicating with the anterior tibialis, concerning for possivle septic tenosynovitis, likely anterior compartment myositis right leg near the wound  - Recent excisional skin biopsy RLE w hypertrophic lichen planus, squamous cell carcinoma with unclear margins, on 6//10.  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 Blood cultures x 1 NGTD. MRSA susceptibilities reviewed. Imaging without c/f osteo.     Ortho and plastics do not seem to be planning any imminent procedures based on pended notes.    On reviewing serial photos in chart, appears to be w improvement in terms of erythema and swelling. This is corroborated by patient, who reports much improvement in redness, pain and swelling today. Now able to move toes, previously was not.     Added on WBC to today's labs for an additional data point, back elevated at 16. Was normalized at 10 last check, but this was 6 days ago, unclear where she was in the interim. She does have continued purulence in the anterior shin ulcer, and CRP 92-->104-->168. Given clinical picture does not correlate with imaging/inflammatory markers, I wonder if the latter are just lagging behind. She might have just needed a longer course. That being said, while gram positives like Staph or strep are common culprits " in cellulitis, in the setting of possible tenosynovitis and myositis, new development of purulence while on treatment w Vanc. Ceftriaxone on 6/19m and considering recent surgical procedures/malignancy, feel prudent to provide expanded gram negative and anaerobic coverage at this time.     Will expand from Ceftriaxone to Zosyn. Cefepime/Flagyl would have been another option but Flagyl interacts w warfarin so will try to avoid. Vanc/ Zosyn combination can cause a transient elevation in Cr while on it - expert opinion suggests this is due to tubular secretion effect and not a refection of actual reduced kidney function, would monitor. Did consider atypical causes given slow  improvement on antibacterials, but feel appearance not characteristic. Resistant gram negatives are anotehr consideration if no improvement on expanded coverage. Ulcer proximity to shin makes her high risk for osteo, would consider a repeat Xray in a week or two to ensure no osteo development.     Recommendations:  - Continue Vanc  - Stop ceftriaxone, start Zosyn at Pseudomonas dosing (ordered for you)  - Monitor Cr  - Follow ortho, plastics recs  - If any procedures done, please obtain aerobic, anaerobic cultures  - Monitor WBC daily, CRP every 2 days until trend down evident  - Consider repeat Xray in a week or two    I have reviewed vitals, labs, images, cultures and antibiotics    ID will continue to follow, Dr James talbert will assume care tomorrow    Total time on day of visit including chart review, visit, counseling, documentation and coordination of care: 75 minutes    Deisy Villalba  Staff Physician  Division of Infectious Diseases        History of Present Illness:     History partly adapted from provider notes    83 y/o F with a PMH of  type 2 diabetes, hypertension, hyperlipidemia, CKD stage IIIb, chronic atrial fibrillation, on warfarin, and mild cognitive impairment who had a recent excisional skin biopsy RLE that showed  "hypertrophic lichen planus ( newly diagnosed ) with squamous cell carcinoma with unclear margins, on 6//10. Prior to this she had another biopsy around 6/4( a biopsy that was not deep enough, resulted benign per pt)    Shortly after developed redness, pain, swelling, fevers and chills so came to ED. Was found to have leukocytosis, fevers. She was admitted to [facility] 6/14/2024  with right leg cellulitis. 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 She was initially on Vanc, later Ceftriaxone was added 6/18. Patient started having purulent drainage 6/19. Wound cultures showed MRSA. OSH ID (Dr Debi Baker, [facility]) was following.     CRP was initially trending down then trending up 146 > 92(6/19) > 104 > 168. WBC 17-->normal 6/17, not done since.     6/21 repeat MRI showed fluid collection 1.2 x 1 x 2.2 cm which appearsed to be communicating with the anterior tibialis, concerning for possivle septic tenosynovitis, likely anterior compartment myositis right leg near the wound.    Per ID notes\" at this point is on day 9 of appropriate antibiotic therapy with minimal clinical improvement, persistently elevated inflammatory markers and imaging with worsening fluid collection. Favor reconsulting orthopedics to evaluate for source control\"    Per [place] [facility]\"  When I inspected the wound there was a transverse based incision with necrotic appearing tissues proximally and distally in a longitudinally orientation.  The site of the prior punch biopsy is located more posteriorly.  There is associated erythema and induration of the skin distally around the site, and given this the concern regarding this appearance is of infection vs tumor burden.  Given the patient's history of excision and this rapidly progressing wound I do not feel it is appropriate to treat this without a coordinated care team consisting of an oncologist, " "orthopedist, and possible plastic surgery for coverage given the location of the wound and the appearance\"    She was transferred to [facility] 6/22/24 for orthopedic surgery eval and consideration of washout.     Per pended notes:  Orthopedic surgery is attributing the tenosynovitis to the deep abscess within the anterior tibialis, therefore plastic surgery was consulted to evaluate the wound due to soft tissue involvement. It seems plastics not planning OR. Plastics recommends for now peroxide soak to clean up tissue within wound, then wound care with betadine soaked gauze wet to dry changed BID.     Pt reports much improvement in redness, pain and swelling today. Now able to move toes, previously was not. Had chills the day after she arrived at other hospital. No longer. No fevers. No known antibiotic allergies other than macrobid. 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   No family history on file.         Social History:     Social History     Socioeconomic History    Marital status:      Spouse name: Not on file    Number of children: Not on file    Years of education: Not on file    Highest education level: Not on file   Occupational History    Not on file   Tobacco Use    Smoking status: Former     Types: Cigarettes    Smokeless tobacco: Never   Substance and Sexual Activity    Alcohol use: Never    Drug use: Never    Sexual activity: Not on file   Other Topics Concern    Not on file   Social History Narrative    Not on file     Social Determinants of Health     Financial Resource Strain: Not on file   Food Insecurity: Not on file   Transportation Needs: Not on file   Physical Activity: Not on file   Stress: Not on file   Social Connections: Not on file "   Interpersonal Safety: Not on file   Housing Stability: Not on file              Physical Exam:   /55   Pulse 70   Temp 98.7  F (37.1  C) (Oral)   Resp 16   SpO2 98%    Exam:  GENERAL:  Well-developed, well-nourished, sitting in bed in no acute distress.   ENT:  Head is normocephalic, atraumatic.  EYES:  Eyes have anicteric sclerae.    LUNGS: Breathing comfortably  EXT: Extremities warm and without edema.  SKIN:  No acute rashes. R:E Redness appears improved. Swelling resolved. No warmth or ttp. Anterior ulcer w purulence  NEUROLOGIC:  Grossly nonfocal.         Laboratory Data:     Creatinine   Date Value Ref Range Status   06/23/2024 1.54 (H) 0.51 - 0.95 mg/dL Final   06/22/2024 1.35 (H) 0.51 - 0.95 mg/dL Final   06/21/2024 1.31 (H) 0.51 - 0.95 mg/dL Final   06/20/2024 1.27 (H) 0.51 - 0.95 mg/dL Final   06/19/2024 1.23 (H) 0.51 - 0.95 mg/dL Final     WBC   Date Value Ref Range Status   09/21/2019 8.1 4.0 - 11.0 thou/uL Final   09/20/2019 14.0 (H) 4.0 - 11.0 thou/uL Final   09/20/2019 13.5 (H) 4.0 - 11.0 thou/uL Final     WBC Count   Date Value Ref Range Status   06/17/2024 10.3 4.0 - 11.0 10e3/uL Final   06/15/2024 13.9 (H) 4.0 - 11.0 10e3/uL Final   06/14/2024 17.0 (H) 4.0 - 11.0 10e3/uL Final   04/18/2022 8.8 4.0 - 11.0 10e3/uL Final     Hemoglobin   Date Value Ref Range Status   06/22/2024 10.8 (L) 11.7 - 15.7 g/dL Final     Platelet Count   Date Value Ref Range Status   06/17/2024 201 150 - 450 10e3/uL Final     Lab Results   Component Value Date     06/16/2024    BUN 33.7 (H) 06/16/2024    CO2 21 (L) 06/16/2024          Pertinent Recent Microbiology Data:   As above         Imaging:     Recent Results (from the past 48 hour(s))   MR Tibia Fibula Lower Leg Right wo & w Contr    Narrative    EXAM: MR TIBIA FIBULA LOWER LEG RIGHT W/O and W CONTR  LOCATION: [facility]  DATE: 6/21/2024    INDICATION: Recent biopsy, no improvement with antibiotics. Evaluate for  abscess.  COMPARISON: 6/15/2024.  TECHNIQUE: Routine. Additional postgadolinium T1 sequences were obtained.  IV CONTRAST: Gadavist 7.5 mL.    FINDINGS:     BONES:   -No fracture, bone contusion, or stress reaction. No concerning marrow replacing lesion. No MR finding for acute tibia or fibula osteomyelitis.    SOFT TISSUES:    -Redemonstrated is an open wound along the anterior mid tibia anterolateral to the corresponding tibial shaft as best seen on image 50 of series 10. Adjacent cellulitis. There is a small deep fluid collection compatible with an abscess as seen best on   images 48-51 measuring approximately 1.2 cm transverse x 1.0 cm anteroposterior x approximately 2.2 cm craniocaudal. The collection appears to communicate with the anterior tibialis and extensor digitorum longus tendon sheaths particularly the anterior   tibialis compatible with septic tenosynovitis. There is likely myositis of both the corresponding anterior tibialis and extensor digitorum longus musculature in the anterior compartment right leg. Sizable knee joint effusion on the right with synovitis.   Knee arthrosis on both sides. Intrinsic right calf muscle bulk is remarkable for some fatty infiltration fairly diffusely but no significant muscle atrophy. Generalized right leg soft tissue swelling.    Images of the contralateral left tibia and fibula demonstrate no fracture, stress fracture or bone lesion. No acute osteomyelitis. No acute left calf muscle strain or significant muscle edema. No significant left leg soft tissue swelling.      Impression    IMPRESSION:  1.  Open wound redemonstrated anterolateral to the midshaft right tibia with adjacent cellulitis.  2.  Defined subcutaneous fluid collection deep to the wound in the anterior mid right leg soft tissues along the superficial muscle belly of the anterior tibialis, approximately 1.2 x 1.0 x 2.2 cm.  3.  The collection appears to communicate with the anterior tibialis greater than  extensor digitorum longus tendon sheaths, concerning for septic tenosynovitis.  4.  Likely anterior compartment myositis right leg near the wound.  5.  No evidence for right tibia or fibula acute osteomyelitis.

## 2024-06-23 NOTE — PLAN OF CARE
Goal Outcome Evaluation:      Plan of Care Reviewed With: patient    Overall Patient Progress: no changeOverall Patient Progress: no change    Outcome Evaluation: pt arrived to 5 ortho at approx 2000 from [facility]. pt is A&O x4, has call light within reach and is able to make needs known. hx of HTN and Afib, denies SOB and CP, VSS, SpO2>92% on 3L NC. R anterior calf is red and franklin in color, dressing is CDI, pt reports 5/10 pain in RLE, as well as R shoulder and back pain which she reports is her baseline. T2DM, blood glucose checks with sliding scale and carb coverage. 2RN skin check completed, pt takes warfarin at baseline and has multiple scattered bruises, as well as redness in groin folds. pt will be NPO at midnight for possible OR tomorrow. continue plan of care      VS: Temp: 98.6  F (37  C) Temp src: Oral BP: (!) 149/53 Pulse: 85   Resp: 16 SpO2: 97 % O2 Device: Nasal cannula Oxygen Delivery: 2 LPM     O2: SpO2>90% on 2LPM via NC, denies SOB and CP   Output: Villalobos catheter in place, draining adequately   Last BM: 06/14/2024, pt declined PRN bowel meds. Pt denies feeling constipated   Activity: Up with assist of 2 with walker, GB. Not oob this janis   Skin: Multiple scattered bruises to BUE, midline upper back bruise, redness in bilateral groin folds, R lower leg cellulitis   Pain: 5/10 RLE, back, and R shoulder pain, managed with prn oxycodone 5mg, pt has robaxin tylenol available as well   CMS: A&O x4 with intermittent forgetfulness. Denies N/T   Dressing: RLE dressing CDI   Diet: Consistent carb 60g diet, will be NPO at midnight for possible procedure tomorrow   LDA: R PIV SL   Equipment: IV pole, personal belongings   Plan: Possible I&D tomorrow, orthopedic consult placed this janis and ortho team notified of pt arrival to floor.    Additional Info:

## 2024-06-23 NOTE — PLAN OF CARE
Goal Outcome Evaluation:      Plan of Care Reviewed With: patient    Overall Patient Progress: no change    SW unsure if bed hold is still in place, LVM for Winnie in admissions at [facility] in [place].

## 2024-06-23 NOTE — H&P
[facility] History [facility] Physical  Department of Internal Medicine    Patient Name: Francy Sherman MRN# [id number]   Age: 84 year old YOB: 1940     Date of Admission:6/22/2024    Primary care provider: Samy Suazo  Date of Service: 6/22/2024  Admitting Team: [facility]          Assessment and Plan:     This is an 84-year-old female patient who was transferred here from [facility] where she was hospitalized with cellulitis of the right lower extremity.  She has been on IV ceftriaxone and vancomycin.  Patient had an right lower extremity skin biopsy done by dermatology recently and that came out showing hypertrophiclichen planus with squamous cell carcinoma with clear margins.      Right lower extremity cellulitis.  Currently on vancomycin and ceftriaxone IV.  Transferred here for orthopedic consult and possible I&D/washout.  Orthopedic consult has been placed.  Per ID note, patient started having purulent drainage on exam on 6/19, culture sent.  Culture growing gram-positive cocci, MRSA ID recommended to continue vancomycin and ceftriaxone pending final cultures and sensitivity.      MRSA infection  History of lichen planus  Right lower extremity skin cancer extraction ~6/4/2024.  Pathology is showing hypertrophic lichen planus with squamous cell carcinoma with unclear margins.  Dermatology is waiting for biopsy report on 6/24.  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  Since admission has been receiving vancomycin.  6/18 ceftriaxone was added.  ID and orthopedics consultation appreciated.  6/19 wound culture showing MRSA.  Placed on contact precautions.  6/21 repeat MRI showed fluid collection 1.2 x 1 x 2.2 cm which appears to be communicating with the anterior tibialis indicative of tenosynovitis.  CRP increasing.  6/22  made NPO.  [facility] reconsulted for consideration of possible washout in light of abscess despite receiving appropriate antibiotics.  If proceeding to the OR would need anticoagulation correction.  INR 2.48.     Chronic kidney disease stage IIIb  Creatinine holding stable at baseline around 1.3.  Follow daily BMP.     Chronic atrial fibrillation  Essential hypertension  Continue home Norvasc and metoprolol.  Ordered hold parameters.     Type 2 diabetes  Admission hemoglobin A1c 7.  Glucose under reasonable control values around 150.  Glipizide held since admission.  Continue home Lantus 10 units nightly.     Hyperlipidemia  Continue home Pravachol.     Mild cognitive impairment  Family notes that patient has been a bit more confused than her usual baseline since admission.  May be related to oxycodone.  OT consulted for slums assessment.     Chronic anticoagulation   Warfarin management per pharmacy.  6/22 INR 2.48.  Hold warfarin at this time until evaluation by orthopedics.  May need reversal agent if going to the OR.    CODE: DNR  Diet/IVF: NPO after mid night   DVT ppx: warfarin with therapeutic INR, on hold for orthopedic surgery  Disposition/Admission Status: In patient     Sergio Terrell MD  Internal Medicine Staff Hospitalist  [facility]  Pager: [phone]       Chief Complaint:   Right lower extremity cellulitis and wound infection         HPI:     This is an 84-year-old female patient who was transferred here from [facility] where she was hospitalized with cellulitis of the right lower extremity.  She has been on IV ceftriaxone and vancomycin.  Patient had an right lower extremity skin biopsy done by dermatology recently and that came out showing hypertrophiclichen planus with squamous cell carcinoma with clear margins.  In spite of being on IV antibiotics patient continues to have cellulitic changes, it was felt that patient may need orthopedic surgery consult for  possible washout of the infected right shin area biopsy site.  Patient is currently afebrile.  Denies any chills.  No any vomiting or diarrhea.  No abdominal pain.  She denies any shortness of breath, chest pain or dizziness.  She is on 3 L of oxygen via nasal cannula, satting in the upper 90s.  Denies any past history of asthma, COPD or any congestive heart failure or myocardial infarction.  Past medical history significant for type 2 diabetes, hypertension, hyperlipidemia, CKD stage IIIb, chronic atrial fibrillation and mild cognitive impairment.  She is chronically anticoagulated with on warfarin with an INR that is therapeutic.  Warfarin is held for possible orthopedic surgery I&D that might need reversal of the anticoagulation.             Past Medical History:   Hypertension  Hyperlipidemia  Diabetes type 2  Chronic kidney disease stage IIIb  Chronic atrial fibrillation  Chronic anticoagulation           Past Surgical History:     Past Surgical History:   Procedure Laterality Date    CHOLECYSTECTOMY     Excisional biopsy of the right lower extremity/shin area           Social History:   Reviewed  Social History     Socioeconomic History    Marital status:      Spouse name: Not on file    Number of children: Not on file    Years of education: Not on file    Highest education level: Not on file   Occupational History    Not on file   Tobacco Use    Smoking status: Former     Types: Cigarettes    Smokeless tobacco: Never   Substance and Sexual Activity    Alcohol use: Never    Drug use: Never    Sexual activity: Not on file   Other Topics Concern    Not on file   Social History Narrative    Not on file     Social Determinants of Health     Financial Resource Strain: Not on file   Food Insecurity: Not on file   Transportation Needs: Not on file   Physical Activity: Not on file   Stress: Not on file   Social Connections: Not on file   Interpersonal Safety: Not on file   Housing Stability: Not on file          "   Family History:   No any pertinent family history         Immunizations:     Immunization History   Administered Date(s) Administered    COVID-19 Bivalent 12+ (Pfizer) 10/24/2022    COVID-19 MONOVALENT 12+ (Pfizer) 02/04/2021, 02/25/2021, 11/16/2021    COVID-19 Monovalent 12+ (Pfizer 2022) 04/12/2022              Allergies:      Allergies   Allergen Reactions    Macrobid [Nitrofurantoin] Rash            Medications:     Medications Prior to Admission   Medication Sig Dispense Refill Last Dose    acetaminophen (TYLENOL) 325 MG tablet Take 650 mg by mouth every morning       amLODIPine (NORVASC) 5 MG tablet [AMLODIPINE (NORVASC) 5 MG TABLET] Take 5 mg by mouth daily.       cephALEXin (KEFLEX) 500 MG capsule Take 500 mg by mouth 2 times daily Start 7 day course 6/13/24       glipiZIDE (GLUCOTROL) 5 MG tablet Take 5 mg by mouth 2 times daily (before meals)       LANTUS SOLOSTAR U-100 INSULIN 100 unit/mL (3 mL) pen [LANTUS SOLOSTAR U-100 INSULIN 100 UNIT/ML (3 ML) PEN] Inject 10 Units under the skin at bedtime. 11.65 Type 2 with hyperglycemia  Contact provider if insulin prescribed is not the preferred insulin per insurance. (Patient taking differently: Inject Subcutaneous at bedtime Recent available clinic records suggest 16 units at bedtime.  Patient says she takes between \"50 and 250 units daily\" asked multiple times to clarify) 5 pen PRN     metoprolol succinate (TOPROL-XL) 25 MG [METOPROLOL SUCCINATE (TOPROL-XL) 25 MG] Take 1 tablet (25 mg total) by mouth daily. 30 tablet 0     pravastatin (PRAVACHOL) 40 MG tablet Take 40 mg by mouth at bedtime       warfarin ANTICOAGULANT (COUMADIN) 5 MG tablet Take 5-7.5 mg by mouth See Admin Instructions Take 7.5mg Mon/Wed/Fri and take 5mg rest of week.                Review of Systems:   A complete ROS was performed and is negative other than what is stated in the HPI.          Physical Exam:   BP (!) 149/53 (BP Location: Right arm, Patient Position: Semi-Vital's)   Pulse " 85   Temp 98.6  F (37  C) (Oral)   Resp 16   SpO2 97%    Constitutional: awake, alert, cooperative, no apparent distress, and appears stated age  ENT: Normocephalic, without obvious abnormality, atraumatic, external ears without lesions, oral pharynx with moist mucous membranes, tonsils without erythema or exudates, gums normal and good dentition.  Respiratory: No increased work of breathing, good air exchange, clear to auscultation bilaterally, no crackles or wheezing  Cardiovascular: Normal apical impulse, regular rate and rhythm, normal S1 and S2, no S3 or S4, and no murmur noted  GI: No scars, normal bowel sounds, soft, non-distended, non-tender, no masses palpated, no hepatosplenomegally  Skin: Anterior right shin dressing left intact.  Significant erythema around surrounding area.  Otherwise normal skin color, texture, turgor, no redness, warmth, or swelling, and no rashes  Musculoskeletal: Patient has redness and swelling to right anterior shin as described above.  Trace edema to left lower extremity otherwise extremity exam unremarkable.   Neurologic: Cranial nerves II-XII are grossly intact. Sensory:  Sensory intact  Neuropsychiatric: General: restless and normal eye contact Level of consciousness: alert / normal Affect: anxious Orientation: oriented to self and place Memory and insight: impaired: Mild         Data:   CBC RESULTS:   Recent Labs   Lab Test 06/22/24  0857 06/17/24  0746   WBC  --  10.3   RBC  --  3.52*   HGB 10.8* 10.8*   HCT  --  33.5*   MCV  --  95   MCH  --  30.7   MCHC  --  32.2   RDW  --  12.6   PLT  --  201     Last Comprehensive Metabolic Panel:  Lab Results   Component Value Date     06/16/2024    POTASSIUM 4.5 06/16/2024    CHLORIDE 105 06/16/2024    CO2 21 (L) 06/16/2024    ANIONGAP 12 06/16/2024     (H) 06/22/2024    BUN 33.7 (H) 06/16/2024    CR 1.35 (H) 06/22/2024    GFRESTIMATED 39 (L) 06/22/2024    KIRBY 8.9 06/16/2024       Liver Function Studies -   Recent Labs    Lab Test 06/16/24  0856   PROTTOTAL 6.0*   ALBUMIN 3.2*   BILITOTAL 0.5   ALKPHOS 115   AST 50*   ALT 97*     INR   Date Value Ref Range Status   06/22/2024 2.48 (H) 0.85 - 1.15 Final        Discussed with the patient.  All her questions answered.  Patient verbalized understanding and agreement with the plan.

## 2024-06-23 NOTE — PROGRESS NOTES
Physical Therapy Discharge Summary    Reason for therapy discharge:    Discharged to transferred to [facility] due to medical status    Progress towards therapy goal(s). See goals on Care Plan in Epic electronic health record for goal details.  Goals not met.  Barriers to achieving goals:   discharge from facility.    Therapy recommendation(s):    Continue PT as medically appropriate

## 2024-06-23 NOTE — CONSULTS
PLASTIC SURGERY CONSULTATION    A/P: 84F with PMH signficant for afib on warfain (INR 2.98), T2DM, hypertension , hyperlipidemia, hypertrophic lichen planus ( newly diagnosed ) with squamous cell carcinoma s/p right anterior shin SCC excision on 6/4 complicated by MRSA+ cellulitis and sepsis with repeat MRI demonstrating new fluid collection  and possible anterior tibialis tenosynovitis despite being on IV ceftriaxone and IV vancomycin .  Orthopedic surgery is attributing the tenosynovitis to the deep abscess within the anterior tibialis, therefore plastic surgery was consulted to evaluate the wound due to soft tissue involvement.     - for now recommend peroxide soak to clean up tissue within wound then wound care with betadine soaked gauze wet to dry changed BID  - ok to eat today, do not need to make NPO at [facility]  - hold off on reversing warfarin today  - plastics will continue to follow    D/W Dr. Lily Cole MD - PGY 1  Plastic Surgery Resident  Pager [phone]  ---------------------------------    CC: leg wound    HPI:  84F with PMH signficant for afib on warfain (INR 2.98), T2DM, hypertension , hyperlipidemia, hypertrophic lichen planus ( newly diagnosed ) with squamous cell carcinoma s/p right anterior shin SCC excision on 6/4 complicated by cellulitis and sepsis admitted to [facility] 6/14 then subsequently transferred to [facility] on 6/22 for orthopedic consult after MRI there showed superficial soft tissue edema  repeat MRI 6/21 now with new fluid collection  and possible anterior tibialis tenosynovitis despite being on IV ceftriaxone and IV vancomycin . Wound culture grew MRSA. Orthopedic surgery is attributing the tenosynovitis to the deep abscess within the anterior tibialis, therefore plastic surgery was consulted to evaluate the wound due to soft tissue involvement.     ROS:   10 point Review of Systems is negative other than noted in the HPI.    Past Medical History:  No past medical  history on file.    Past Surgical History:   Past Surgical History:   Procedure Laterality Date    CHOLECYSTECTOMY         Medications:   Current Facility-Administered Medications   Medication Dose Route Frequency Provider Last Rate Last Admin    acetaminophen (TYLENOL) tablet 650 mg  650 mg Oral Q6H PRN Nelson Navarrete MD        amLODIPine (NORVASC) tablet 5 mg  5 mg Oral Daily Nelson Navarrete MD   5 mg at 06/23/24 0935    cefTRIAXone (ROCEPHIN) 2 g vial to attach to  ml bag for ADULTS or NS 50 ml bag for PEDS  2 g Intravenous Q24H Nelson Navarrete MD        glucose gel 15-30 g  15-30 g Oral Q15 Min PRN Nelson Navarrete MD        Or    dextrose 50 % injection 25-50 mL  25-50 mL Intravenous Q15 Min PRN Nelson Navarrete MD        Or    glucagon injection 1 mg  1 mg Subcutaneous Q15 Min PRN Nelson Navarrete MD        HYDROmorphone (DILAUDID) injection 0.2 mg  0.2 mg Intravenous Q2H PRN Nelson Navarrete MD        insulin aspart (NovoLOG) injection (RAPID ACTING)  1-7 Units Subcutaneous 4x Daily AC & HS Nelson Navarrete MD   1 Units at 06/22/24 2211    insulin aspart (NovoLOG) injection (RAPID ACTING)   Subcutaneous TID w/meals Nelson Navarrete MD        insulin glargine (LANTUS PEN) injection 10 Units  10 Units Subcutaneous At Bedtime Nelson Navarrete MD   10 Units at 06/22/24 2212    lidocaine (LMX4) cream   Topical Q1H PRN Nelson Navarrete MD        lidocaine 1 % 0.1-1 mL  0.1-1 mL Other Q1H PRN Nelson Navarrete MD        methocarbamol (ROBAXIN) tablet 500 mg  500 mg Oral TID PRN Nelson Navarrete MD        metoprolol succinate ER (TOPROL XL) 24 hr tablet 25 mg  25 mg Oral Daily Nelson Navarrete MD   25 mg at 06/23/24 0935    naloxone (NARCAN) injection 0.2 mg  0.2 mg Intravenous Q2 Min PRN Nelson Navarrete MD        Or    naloxone (NARCAN) injection 0.4 mg  0.4 mg Intravenous Q2 Min PRN Nelson Navarrete MD        Or    naloxone (NARCAN) injection 0.2 mg  0.2 mg Intramuscular Q2 Min PRN  Nelson Navarrete MD        Or    naloxone (NARCAN) injection 0.4 mg  0.4 mg Intramuscular Q2 Min PRN Nelson Navarrete MD        ondansetron (ZOFRAN ODT) ODT tab 4 mg  4 mg Oral Q6H PRN Nelson Navarrete MD        Or    ondansetron (ZOFRAN) injection 4 mg  4 mg Intravenous Q6H PRN Nelson Navarrete MD        oxyCODONE (ROXICODONE) tablet 5 mg  5 mg Oral Q4H PRN Nelson Navarrete MD   5 mg at 06/22/24 2239    Patient is already receiving anticoagulation with heparin, enoxaparin (LOVENOX), warfarin (COUMADIN)  or other anticoagulant medication   Does not apply Continuous PRN Nelson Navarrete MD        pravastatin (PRAVACHOL) tablet 40 mg  40 mg Oral At Bedtime Nelson Navarrete MD   40 mg at 06/22/24 2212    senna-docusate (SENOKOT-S/PERICOLACE) 8.6-50 MG per tablet 1 tablet  1 tablet Oral BID PRN Nelson Navarrete MD        Or    senna-docusate (SENOKOT-S/PERICOLACE) 8.6-50 MG per tablet 2 tablet  2 tablet Oral BID PRNelson Juarez MD        sodium chloride (PF) 0.9% PF flush 3 mL  3 mL Intracatheter Q8H Nelson Navarrete MD   3 mL at 06/23/24 1034    sodium chloride (PF) 0.9% PF flush 3 mL  3 mL Intracatheter q1 min prn Nelson Navarrete MD        sodium chloride (PF) 0.9% PF flush 3 mL  3 mL Intracatheter q1 min preNlson Juarez MD        vancomycin (VANCOCIN) 750 mg in sodium chloride 0.9 % 250 mL intermittent infusion  750 mg Intravenous Q24H Deejay Batres MD   750 mg at 06/23/24 1037       Allergies:   Allergies   Allergen Reactions    Macrobid [Nitrofurantoin] Rash       Social History:   Social History     Socioeconomic History    Marital status:      Spouse name: Not on file    Number of children: Not on file    Years of education: Not on file    Highest education level: Not on file   Occupational History    Not on file   Tobacco Use    Smoking status: Former     Types: Cigarettes    Smokeless tobacco: Never   Substance and Sexual Activity    Alcohol use: Never    Drug use: Never     Sexual activity: Not on file   Other Topics Concern    Not on file   Social History Narrative    Not on file     Social Determinants of Health     Financial Resource Strain: Not on file   Food Insecurity: Not on file   Transportation Needs: Not on file   Physical Activity: Not on file   Stress: Not on file   Social Connections: Not on file   Interpersonal Safety: Not on file   Housing Stability: Not on file       Family History:   No family history on file.    No history of bleeding/clotting problems or troubles with anesthesia.     Physical Exam:  /55   Pulse 70   Temp 98.7  F (37.1  C) (Oral)   Resp 16   SpO2 98%   General: NAD, following commands  HEENT: pupils equal and reactive  CV: RRR  Pulm: Non labored breathing  RLE ttp over mid shin, macerated wound 2cm long x 1 cm wide, fibrinous material in central wound. no fluid expressed, shin and foot are swollen however no appreciable fluctuance, no palpable superficial abscess.     RLE dressing removed    RLE steri strips in place  LLE no wounds, nontender, peripheral pulses palpable     Labs: Reviewed in full.  BMP  Recent Labs   Lab 06/23/24  0726 06/23/24  0708 06/23/24  0229 06/22/24  1729 06/22/24  0835 06/22/24  0705 06/21/24  0632 06/21/24  0537 06/20/24  0615 06/20/24  0434   CR  --  1.54*  --   --   --  1.35*  --  1.31*  --  1.27*   *  --  137* 140* 158*  --    < >  --    < >  --     < > = values in this interval not displayed.     CBC  Recent Labs   Lab 06/22/24  0857 06/17/24  0746   WBC  --  10.3   HGB 10.8* 10.8*   PLT  --  201     LFT  Recent Labs   Lab 06/23/24  0708 06/22/24  0705 06/21/24  0537 06/20/24  0434   INR 2.98* 2.48* 2.65* 2.66*     Recent Labs   Lab 06/23/24  0726 06/23/24  0229 06/22/24  1729 06/22/24  0835 06/22/24  0633 06/21/24  2215   * 137* 140* 158* 162* 186*       Imaging: Reviewed.   No results found for this or any previous visit (from the past 24 hour(s)).

## 2024-06-23 NOTE — PLAN OF CARE
VS: VSS, pt denied CP or SOB.   O2: Room air sat. > 90%.   Output: Villalobos in place with adequate output.    Last BM: Pt stated one week ago.    Activity: Not out of bed.   Skin: Intact except some bruising on BUE and wound on RLE (shin)   Pain: Comfortably manageable with PRN medication.   Neuro: CMS and neuro intact to baseline.    Dressing: CDI.   Diet: Regular tolerating okay.    LDA: PIV TKO between antibiotic's.    Equipment: IV pole and personal belongings.    Plan: TBD.    Additional Info:

## 2024-06-23 NOTE — CONSULTS
[facility], Orthopaedic Surgery Consultation    Francy Sherman MRN# [id number]   Age: 84 year old YOB: 1940     Date of Admission:  6/22/2024    Reason for consult: RLE cellulitis s/p excision of squamous cell carcinoma        Requesting physician: [facility]          Assessment and Plan:   Assessment:  83 yo female s/p excision of squamous cell carcimona of the right lower leg ~ 6/4/2024 admitted to [facility] on 6/14 with cellulitis around the excision site. Wound culture grew MRSA. MRI at [facility] demonstrated a 1 x 1 x 2 cm fluid collection at the excision site. Was on vancomycin and ceftriaxone there. CRP trending up 146 > 92 > 104 > 168.     While the MRI read does comment on septic tenosynovitis of the tibialis anterior, this is likely secondary to the fluid collection that has developed from her wound from the excision of the squamous cell carcinoma. Given the soft tissue nature of the wound as well as the necrotic skin, would defer to our plastic surgery colleagues at this time.     Plan:  Would recommend plastic surgery consultation for evaluation of wound  Recommend continued antibiotics per primary team   Continue to trend CRP              History of Present Illness:   This is a 84 year old year old female who presents with right leg pain s/p excision of squamous cell carcimona of the right lower leg ~ 6/4/2024 admitted to [facility] on 6/14 with cellulitis around the excision site.    She was transferred to the [place] overnight and states her pain has improved and she feels the redness/swelling has also improved.     No numbness, tingling or weakness.   On warfarin     Patient was seen and examined by me. History, PMH, Meds, SH, complete ROS (10 organ systems) and PE reviewed with patient and prior medical records.              Past Medical History:   No past medical history on file.          Past Surgical History:     Past Surgical History:   Procedure Laterality Date     CHOLECYSTECTOMY               Social History:     Social History     Socioeconomic History    Marital status:    Tobacco Use    Smoking status: Former     Types: Cigarettes    Smokeless tobacco: Never   Substance and Sexual Activity    Alcohol use: Never    Drug use: Never             Family History:   No family history on file.           Medications:     Current Facility-Administered Medications   Medication Dose Route Frequency Provider Last Rate Last Admin    acetaminophen (TYLENOL) tablet 650 mg  650 mg Oral Q6H PRN Nelson Navarrete MD        amLODIPine (NORVASC) tablet 5 mg  5 mg Oral Daily Nelson Navarrete MD        cefTRIAXone (ROCEPHIN) 2 g vial to attach to  ml bag for ADULTS or NS 50 ml bag for PEDS  2 g Intravenous Q24H Nelson Navarrete MD        glucose gel 15-30 g  15-30 g Oral Q15 Min PRN Nelson Navarrete MD        Or    dextrose 50 % injection 25-50 mL  25-50 mL Intravenous Q15 Min PRNelson Hercules MD        Or    glucagon injection 1 mg  1 mg Subcutaneous Q15 Min PRN Nelson Navarrete MD        HYDROmorphone (DILAUDID) injection 0.2 mg  0.2 mg Intravenous Q2H PRN Nelson Navarrete MD        insulin aspart (NovoLOG) injection (RAPID ACTING)  1-7 Units Subcutaneous 4x Daily AC & HS Nelson Navarrete MD   1 Units at 06/22/24 2211    insulin aspart (NovoLOG) injection (RAPID ACTING)   Subcutaneous TID w/meals Nelson Navarrete MD        insulin glargine (LANTUS PEN) injection 10 Units  10 Units Subcutaneous At Bedtime Nelson Navarrete MD   10 Units at 06/22/24 2212    lidocaine (LMX4) cream   Topical Q1H PRN Nelson Navarrete MD        lidocaine 1 % 0.1-1 mL  0.1-1 mL Other Q1H PRN Nelson Navarrete MD        methocarbamol (ROBAXIN) tablet 500 mg  500 mg Oral TID PRN Nelson Navarrete MD        metoprolol succinate ER (TOPROL XL) 24 hr tablet 25 mg  25 mg Oral Daily Nelson Navarrete MD        naloxone (NARCAN) injection 0.2 mg  0.2 mg Intravenous Q2 Min PRN Nelson Navarrete MD         Or    naloxone (NARCAN) injection 0.4 mg  0.4 mg Intravenous Q2 Min PRNelson Juarez MD        Or    naloxone (NARCAN) injection 0.2 mg  0.2 mg Intramuscular Q2 Min PRNelson Juarez MD        Or    naloxone (NARCAN) injection 0.4 mg  0.4 mg Intramuscular Q2 Min PRNelson Juarez MD        ondansetron (ZOFRAN ODT) ODT tab 4 mg  4 mg Oral Q6H PRN Nelson Navarrete MD        Or    ondansetron (ZOFRAN) injection 4 mg  4 mg Intravenous Q6H PRN Nelson Navarrete MD        oxyCODONE (ROXICODONE) tablet 5 mg  5 mg Oral Q4H PRN Nelson Navarrete MD   5 mg at 06/22/24 2239    Patient is already receiving anticoagulation with heparin, enoxaparin (LOVENOX), warfarin (COUMADIN)  or other anticoagulant medication   Does not apply Continuous PRN Nelson Navarrete MD        pravastatin (PRAVACHOL) tablet 40 mg  40 mg Oral At Bedtime Nelson Navarrete MD   40 mg at 06/22/24 2212    senna-docusate (SENOKOT-S/PERICOLACE) 8.6-50 MG per tablet 1 tablet  1 tablet Oral BID PRN Nelson Navarrete MD        Or    senna-docusate (SENOKOT-S/PERICOLACE) 8.6-50 MG per tablet 2 tablet  2 tablet Oral BID PRNelson Juarez MD        sodium chloride (PF) 0.9% PF flush 3 mL  3 mL Intracatheter Q8H Nelson Navarrete MD   3 mL at 06/23/24 0258    sodium chloride (PF) 0.9% PF flush 3 mL  3 mL Intracatheter q1 min prNelson Juarez MD        sodium chloride (PF) 0.9% PF flush 3 mL  3 mL Intracatheter q1 min prNelson Juarez MD        vancomycin (VANCOCIN) 1,000 mg in 200 mL dextrose intermittent infusion  1,000 mg Intravenous Q24H Nelson Navarrete MD                 Allergies:      Allergies   Allergen Reactions    Macrobid [Nitrofurantoin] Rash            Review of Systems:   A comprehensive 10 point review of systems (constitutional, ENT, cardiac, peripheral vascular, respiratory, GI, , Musculoskeletal, skin, Neurological) was performed and found to be negative except as described in this note.     CONSTITUTIONAL:   negative for  fevers, chills, sweats, fatigue, malaise and weight loss  HEENT:  negative for  tinnitus, earaches, nasal congestion, epistaxis, snoring, sore throat and voice change  RESPIRATORY:  negative for  dyspnea, wheezing, hemoptysis, chest pain and cough  CARDIOVASCULAR:  negative for  chest pain, palpitations, orthopnea, edema, syncope  GASTROINTESTINAL:  negative for nausea, vomiting, diarrhea, constipation, abdominal pain, dysphagia, reflux, hematemesis and hemtochezia  GENITOURINARY:  negative for frequency, dysuria, nocturia, urinary incontinence and hematuria  INTEGUMENT/BREAST:  negative for rash and skin lesion(s)  HEMATOLOGIC/LYMPHATIC:  negative for easy bruising, bleeding, lymphadenopathy and swelling/edema  ALLERGIC/IMMUNOLOGIC:  negative for recurrent infections and drug reactions  ENDOCRINE:  negative for heat intolerance, cold intolerance and diabetic symptoms including polyuria and polydipsia  MUSCULOSKELETAL:  negative for  myalgias, arthralgias, pain, joint swelling and muscle weakness  NEUROLOGICAL:  negative for headaches, dizziness, seizures, gait problems, tremor, weakness, numbness and tingling            Physical Exam:   COMPLETE EXAMINATION:   VITAL SIGNS: /46   Pulse 73   Temp 98.7  F (37.1  C) (Oral)   Resp 16   SpO2 98%   RESP: Non labored breathing  ABD: Benign  SKIN: Grossly normal   LYMPHATIC:  Grossly normal  NEURO:  Grossly normal   VASCULAR: Satisfactory perfusion of all extremities  MUSCULOSKELETAL:   RLE         Necrotic tissue on anterior distal tibia  Erythema receding from outlined marks   No palpable fluid collection  No active drainage  Fires EHL/FHL/TA/GSC  SILT in SP/DP/S/S/T distributions  Foot WWP.          Data:   All pertinent laboratory data reviewed  All imaging studies reviewed by me.    Recent Labs   Lab Test 06/22/24  0857 06/21/24  0537 06/19/24  0707 06/17/24  0746 06/15/24  1158 06/15/24  0631 06/14/24  0744   HGB 10.8*  --   --  10.8*  --   "11.4* 12.0   SED 88* 82* 66*  --    < >  --   --    WBC  --   --   --  10.3  --  13.9* 17.0*    < > = values in this interval not displayed.     No results for input(s): \"FTYP\", \"FNEU\", \"FOTH\", \"FCOL\", \"FAPR\", \"FWBC\" in the last 37864 hours.    MRI 6/21 demonstrates small fluid collection near excision site       Signed:    This consultation has been discussed with Dr. Mays, Attending Physician.    Elder Holley MD 06/23/2024  Orthopedic Surgery, PGY4      "

## 2024-06-23 NOTE — PLAN OF CARE
Goal Outcome Evaluation:      Plan of Care Reviewed With: patient    Overall Patient Progress: no changeOverall Patient Progress: no change         VS: /46   Pulse 73   Temp 98.7  F (37.1  C) (Oral)   Resp 16   SpO2 98%      O2: 2L via NC at bedtime. Denies SOB and chest pain.    Output: Patent purewick in place with adequate output.    Last BM: Per pt 06/14/2024. Decline bowel meds   Activity: Not oob this shift. Up with assist of 2 gait belt and walker.    Skin: R lower leg cellulitis.   Decline full skin assessment. Patient resting.   Presence of scattered bruising to BUE, shin and middle upper back.Per report pt has redness to groin folds   Pain: Has PRN's available. Patient resting and sleeping comfortably overnight.    CMS: A& O x4. Denies N/T.   Dressing: RLE dressing,CDI   Diet: NPO midnight for possible procedure.   LDA: R PIV, SL.    Equipment: IV pole and personal belongings.    Plan: TBD. Possible I&D. Ortho consult in place.    Additional Info:

## 2024-06-23 NOTE — PHARMACY-VANCOMYCIN DOSING SERVICE
Pharmacy Vancomycin Initial Note  Date of Service 2024  Patient's  1940  84 year old, female    Indication: Skin and Soft Tissue Infection    Current estimated CrCl = Estimated Creatinine Clearance: 31.7 mL/min (A) (based on SCr of 1.35 mg/dL (H)).    Creatinine for last 3 days  2024:  4:34 AM Creatinine 1.27 mg/dL  2024:  5:37 AM Creatinine 1.31 mg/dL  2024:  7:05 AM Creatinine 1.35 mg/dL    Recent Vancomycin Level(s) for last 3 days  No results found for requested labs within last 3 days.      Vancomycin IV Administrations (past 72 hours)                     vancomycin (VANCOCIN) 1,000 mg in NaCl 0.9% 200 mL intermittent infusion (mg) 1,000 mg Given 24 1032     1,000 mg Given 24 0935     1,000 mg Given 24 1005                    Nephrotoxins and other renal medications (From now, onward)      Start     Dose/Rate Route Frequency Ordered Stop    24 1030  vancomycin (VANCOCIN) 1,000 mg in 200 mL dextrose intermittent infusion         1,000 mg  200 mL/hr over 1 Hours Intravenous EVERY 24 HOURS 24 205              Contrast Orders - past 72 hours (72h ago, onward)      None            [employer] Prediction of Planned Initial Vancomycin Regimen    Regimen: 1000 mg IV every 24 hours.  Start time: 10:32 on 2024  Exposure target: AUC24 (range)400-600 mg/L.hr   AUC24,ss: 596 mg/L.hr  Probability of AUC24 > 400: 99 %  Ctrough,ss: 19.6 mg/L  Probability of Ctrough,ss > 20: 47 %  Probability of nephrotoxicity (Lodise DELMER ): 17 %          Plan:  Vancomycin was started 24 at OSH. Will continue vancomycin 1000mg IV q24hr.  Vancomycin monitoring method: AUC  Vancomycin therapeutic monitoring goal: 400-600 mg*h/L  Pharmacy will check vancomycin levels as appropriate in 1-3 Days.    Serum creatinine levels will be ordered daily for the first week of therapy and at least twice weekly for subsequent weeks.      Akin Sena, [facility]

## 2024-06-23 NOTE — PROGRESS NOTES
1521 page sent to [facility] related to urine retention evaluation/need of schilling; questioning to hold warfarin for possible surgery tomorrow.     1745 report given to 5 ortho, room 529 ortho, RN- report given and updated about paging topics    1945- patient discharged to [place], RN caring for patient updated about page (urine retention/nee of schilling, warfain holding 6/22/24 1800 dose) and departure.     Today: Overall patient has been alert and oriented with intermittent confusion. Confusion has lessened with promotion of naps effective. Pt denies pain. RLE wound less pink, dressing changed. Family updated by provider and prior to departure from [facility]. Schilling remains patent.   Warfarin held 6pm dose related to possible surgery, Raymundo suero RN updated and stated will follow up.

## 2024-06-23 NOTE — PHARMACY-ADMISSION MEDICATION HISTORY
Admission medication history completed at [facility]. Please see Pharmacist Admission Medication History note from 6/14/2024.

## 2024-06-23 NOTE — CONSULTS
Care Management Initial Consult    General Information  Assessment completed with: Patient,    Type of CM/SW Visit: Initial Assessment    Primary Care Provider verified and updated as needed: Yes   Readmission within the last 30 days:        Reason for Consult: discharge planning, other (see comments) (hospital to hospital transfer)  Advance Care Planning: Advance Care Planning Reviewed: other (see comments) (pt has HCD, pt is not interested in having it on file with this hospital, pt spouse and pt children have a personal copy.)          Communication Assessment  Patient's communication style: spoken language (English or Bilingual)             Cognitive  Cognitive/Neuro/Behavioral: WDL  Level of Consciousness: alert  Arousal Level: opens eyes spontaneously  Orientation: oriented x 4  Mood/Behavior: calm, cooperative  Best Language: 0 - No aphasia  Speech: clear, spontaneous, logical    Living Environment:   People in home: spouse  Héctor  Current living Arrangements: house      Able to return to prior arrangements: yes       Family/Social Support:  Care provided by: self  Provides care for: spouse  Marital Status:   , Children, Sibling(s)  Héctor       Description of Support System: Supportive, Involved    Support Assessment: Adequate family and caregiver support, Adequate social supports    Current Resources:   Patient receiving home care services: No     Community Resources: None  Equipment currently used at home: cane, straight  Supplies currently used at home: None    Employment/Financial:  Employment Status: homemaker        Financial Concerns: none   Referral to Financial Worker: No       Does the patient's insurance plan have a 3 day qualifying hospital stay waiver?  No    Lifestyle & Psychosocial Needs:  Social Determinants of Health     Food Insecurity: Not on file   Depression: Not at risk (7/26/2023)    Received from [employer]    PHQ-2     PHQ-2 Score: 0   Housing Stability: Not on file    Tobacco Use: Medium Risk (6/14/2024)    Patient History     Smoking Tobacco Use: Former     Smokeless Tobacco Use: Never     Passive Exposure: Not on file   Financial Resource Strain: Not on file   Alcohol Use: Not on file   Transportation Needs: Not on file   Physical Activity: Not on file   Interpersonal Safety: Not on file   Stress: Not on file   Social Connections: Not on file   Health Literacy: Not on file       Functional Status:  Prior to admission patient needed assistance:   Dependent ADLs:: Independent  Dependent IADLs:: Independent  Assesssment of Functional Status: Not at baseline with ADL Functioning    Mental Health Status:  Mental Health Status: No Current Concerns       Chemical Dependency Status:  Chemical Dependency Status: No Current Concerns             Values/Beliefs:  Spiritual, Cultural Beliefs, Holiness Practices, Values that affect care: yes  Description of Beliefs that Will Affect Care: Restorationism            Additional Information:  Writer was consulted to visit patient after she was transferred from [facility] last night. Writer met with pt at bed side and introduced self and explained SW role.     Per previous Care Coordination now from [facility]:    Reports she lives in a house with one step to enter with  Dae. At baseline states independence with I/ADLs; uses a cane and a standard walker; no home care services; drives only in [place]. 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 Has sons who live locally and will transport patient because  no longer drives.     Pt sons are taking care of pt  right now. SW was unable to confirm if pt had a bed hold in place, writer CLARITA for Winnie in Admissions at [facility]. Pt is agreeable to go to facility upon discharge. Per previous care coordination notes PAS has been completed.       SANCHEZ Akhtar  6/23/2024       Social Work and Care Management Department       SEARCHABLE in [facility] - search SOCIAL WORK        [facility] (0800 - 1630) Saturday and Sunday     Units: 4A Vocera, 4C Vocera, & 4E Vocera        Units: 5A 4502-2359 Vocera, 5A 3706-5743 Vocera , BMT SW 1 BMT SW 2, BMT SW 3 & BMT SW 4  5C Off Service 5401 - 5416  5C Off Service [id number]     Units: 6A Vocera & 6B Vocera      Units: 6C Vocera     Units: 7A Vocera & 7B Vocera      Units: 7C Med Surg 7401 thru 7418 and 7C Med Surg 7502 thru 7521      Unit: [facility] ED Vocera & [facility] Obs Vocera     [facility] ([id number]) Saturday and Sunday      Units: 5 Ortho Vocera, 5 Med Surg Vocera & WB ED Vocera     Units: 6 Med Surg Vocera, 8 Med Surg Vocera, & 10 ICU Vocera      After hours Vocera [facility] and After Hours Vocera [facility]     Saturday & Sunday (1630 - 0000)    Mon-Fri ([id number])     [facility] Recognized Holidays  ([id number])    Units: ALL   - see above VOCERA links to units and after hours

## 2024-06-23 NOTE — PLAN OF CARE
Occupational Therapy Discharge Summary    Reason for therapy discharge:    Discharged to [facility]    Progress towards therapy goal(s). See goals on Care Plan in [facility] electronic health record for goal details.  Goals not met.  Barriers to achieving goals:   limited tolerance for therapy and discharge from facility.    Therapy recommendation(s):    Continued therapy is recommended.  Rationale/Recommendations:  maximize ADL IND.

## 2024-06-23 NOTE — PROGRESS NOTES
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 Patient     was admitted to [facility] 6/14/2024  with right leg cellulitis . MRI there showed superficial soft tissue edema  repeat MRI 6/21 now with new fluid collection  and possible anterior tibialis tenosynovitis despite being on IV ceftriaxone and IV vancomycin . Wound culture grew MRSA  .  Patient  was transferred to [facility] 6/22/24 for  orthopedic surgery   consultation and possible surgery .       Per Dr Batres :\"   Dr. Mays from orthopaedics discussed with [facility] team and patient accepted to [facility] for medicine admission with orthopaedic surgery consultation on [facility] [place] [place]. \"      Periop eval:  Revised cardiac risk index (RCRI)   Hypertension :   1     Patient  also with T2DM   paroxysmal atrial fibrillation    on chronic warfarin INR 6/23/24 2.98  CKD   Dyslipidemia    EKG : sinus rhythm, RBBB that was also present in 2022   Echo in 2019 with EF 55% , no hemodynamically significant valvular abnormality       MRSA infection  Right lower extremity skin cancer extraction ~6/4/2024.  Pathology is showing hypertrophic lichen planus with squamous cell carcinoma with unclear margins. 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  Since admission has been receiving vancomycin.  6/18 ceftriaxone was added.  - ID and orthopedics " consultation appreciated.  -6/19 wound culture showing MRSA.  Placed on contact precautions.  -6/21 repeat MRI showed fluid collection 1.2 x 1 x 2.2 cm which appears to be communicating with the anterior tibialis I concerning for possivle septic tenosynovitis  -.Per ID note, patient started having purulent drainage on exam on 6/19, culture sent.      -  [facility] orthopedic surgery  rec transfer to [facility]  for  possible washout in light of abscess despite receiving appropriate antibiotics.  - ---92.9---104--->168   - patient  seen by orthopedic surgery , I asked orthopedic surgery  to comment on the possible septic tenosynovitis and also ant compartment myositis , per orthopedic surgery   they feel this is likely due to the fluid collection from wound excision ,  ortho deferred back to plastic surgery  - discussed with  plastic surgery  and they will discussed with  orthopedic surgery , no plans for OR for today   -CK was 106 6/14, repeat  6/23 25   - monitor for  compartment syndrome but no evidence at this point   - orthopedic surgery  recommended plastic surgical  consult   - please notify IM if and when surgery Is planned and goal INR for potential surgery    -WOC to see      MRI  6/21/24 : 1.  Open wound redemonstrated anterolateral to the midshaft right tibia with adjacent cellulitis.  2.  Defined subcutaneous fluid collection deep to the wound in the anterior mid right leg soft tissues along the superficial muscle belly of the anterior tibialis, approximately 1.2 x 1.0 x 2.2 cm.  3.  The collection appears to communicate with the anterior tibialis greater than extensor digitorum longus tendon sheaths, concerning for septic tenosynovitis.  4.  Likely anterior compartment myositis right leg near the wound.  5.  No evidence for right tibia or fibula acute osteomyelitis.        Chronic atrial fibrillation  Chronic anticoagulation with warfarin  - continue  metoprolol with holding parameters   -[id number]  "score of at least 5 per cardiology    - hold warfarin, last warfarin dose was 6/21   - INR for 6/23 now 2.98, no immediate plans for OR so no need to reverse at this point, continue to hold  warfarin and let INR drift down and monitor INR    - did get transfusion consent signed in case FFP is needed ,   - echo form 9/2019 shows EF 55%     Essential hypertension  Continue home Norvasc and metoprolol with holding parameters .     LIZZ on Chronic kidney disease stage IIIb  -Creatinine holding stable at baseline around 1.3.  - creatinine now 1.54  -avoid nephrotoxic agents, adjust vancomycin    - monitor  BMP.    Pulmonary nodule  - noted on CT   4x6 mm noncalcified RMD, will need follow up        Type 2 diabetes  Admission hemoglobin A1c 7.  Glucose under reasonable control values around 150.  Glipizide held since admission.  Continue home Lantus 10 units nightly and adjust as needed      Hyperlipidemia  Continue home Pravachol.     Mild cognitive impairment  Family notes that patient has been a bit more confused than her usual baseline since admission.  May be related to oxycodone.  OT consulted for slums assessment.       GERD  -was evaluated in 2013 by GI as had some enlarged  adenopathy around stomach/esophagus , status post  FNA and no malognacy noted     Possibility of vasculitis   - CT 4/16/2013 showed thickening of left gastric artery  with gen mild luminal narrowing , was seen by rheumatology, HILARY was + 1:160    Adenopathy  - noted on CT chest 3/5/2013  \" Adenopathy present around the distal esophagus, pericardium and upper abdomen of unclear etiology. There is thickening of the distal esophageal mucosa conceivably this could be related to an esophageal tumor. adenopathy around \", emerson FENA             Diet: Snacks/Supplements Adult: Glucerna; With Meals  NPO per Anesthesia Guidelines for Procedure/Surgery Except for: Meds    DVT Prophylaxis: Warfarin  Villalobos Catheter: PRESENT, indication: Acute retention or " "obstruction  Lines: None     Cardiac Monitoring: None  Code Status: No CPR- Do NOT Intubate      Clinically Significant Risk Factors Present on Admission               # Drug Induced Coagulation Defect: home medication list includes an anticoagulant medication    # Hypertension: Noted on problem list    # Anemia: based on hgb <11          # DMII: A1C = 7.0 % (Ref range: <5.7 %) within past 6 months   # Overweight: Estimated body mass index is 26.86 kg/m  as calculated from the following:    Height as of 6/14/24: 1.651 m (5' 5\").    Weight as of 6/14/24: 73.2 kg (161 lb 6.4 oz).       # Financial/Environmental Concerns:           Disposition Plan     Medically Ready for Discharge: Anticipated in 2-4 Days             Sammi Araujo MD  Hospitalist Service, GOLD TEAM 18  [facility]  Securely message with Face to Face Live (more info)  Text page via [employer] Paging/Directory   See signed in provider for up to date coverage information  ______________________________________________________________________    Interval History   Sh eis doing well, denies any chest pain  no shortness of breath  no nausea vomiting   Now able to wiggle right toes,     Physical Exam   Vital Signs: Temp: 98.6  F (37  C) Temp src: Oral BP: (!) 149/53 Pulse: 85   Resp: 16 SpO2: 97 % O2 Device: Nasal cannula Oxygen Delivery: 2 LPM  Weight: 0 lbs 0 oz  General appearence: awake alert  in  no apparent distress  RESPIRATORY: lungs clear    CARDIOVASCULAR:S1 S2 regular rate and rhythm, no rubs gallops or murmurs appreciated  GASTROINTESTINAL:soft, non-distended , non-tender , + bowel sounds,   SKIN: warm and dry, no mottling noted   NEUROLOGIC; awake alert and oriented, no focal deficits found  EXTREMITIES: no clubbing, cyanosis or edema ,  able to wigged toes in right, no calf tenderness , calf soft , see images of wound      6/23:            Data     I have personally reviewed the following data over the past 24 " hrs:    N/A  \   N/A   / N/A     N/A N/A N/A /  135 (H)   N/A N/A 1.54 (H) \     Procal: N/A CRP: N/A Lactic Acid: N/A       INR:  2.98 (H) PTT:  N/A   D-dimer:  N/A Fibrinogen:  N/A       Imaging results reviewed over the past 24 hrs:   No results found for this or any previous visit (from the past 24 hour(s)).

## 2024-06-23 NOTE — CONSULTS
[facility]  Pediatric Developmental Center    [facility]  [address], [address] [place]  [address]  [phone]    Name:  Kelly Leyva  Date of Birth:  6  Yrs 10  Mos  Date:  11/24/2020  Duration:  60 MIN  Clinician:  Aminata Burgess M.A.    Diagnosis:    ED Diagnosis   1. Autism         This visit is being performed via video to discuss Video Visit    Clinician Location: [facility]      Kelly is in [place] and his parent or guardian's identity has been established.   The parent or guardian of Kelly was informed that consent to treat includes permission to submit charges to the applicable insurance on file.  The parent or guardian was advised regarding the potential risk inherent in video visits, as the assessment may be limited due to what can be seen on the screen which potentially results in an incomplete assessment; as well as either of us may discontinue the video visit if it is felt that the videoconferencing connections are not adequate for the Kelly's situation.     60 minutes were spent in this encounter.      Kelly  is a 6 year old year old who presents for a telehealth visit via live interactive video with a secure connection.   This visit was not recorded or stored.     Consent for a telehealth visit has been verified including risk of electronic data, possibility of equipment failure or need for in person visit if clinical needs cannot be fully met by telehealth.      Provider location: _X_ Outside facility       ___ clinic            ___office            ___hospital  Patient Location:  _X_ Home ___ clinic         ___hospital        ___ other:                  Reason for use of telehealth: COVID-19 Pandemic    Starting time of visit:   10 am    Ending time of visit: 11 am     Present in session:  Mother and sibling's co-treating therapist, Adrienne Reynoso.     Family Therapy    Subjective/Objective:   Orthopedic surgery consult note:  We were consulted on Francy to evaluate her right lower extremity wound.  Please see hospitalist notes for details regarding admission and hospital stay.  Briefly, this is a 84-year-old female with a medical history notable for type 2 diabetes, chronic atrial fibrillation on warfarin, hypertension, hyperlipidemia and skin skin hit her that was admitted on 6/14/2024 for concerns of cellulitis of the right leg.  She underwent a skin cancer excision 10 days prior to admission.  She was started on IV antibiotics and was not progressing significantly.  We were consulted today for further evaluation.  During my discussion with Francy it became apparent that she underwent an excision and since that time her wound has not healed.  When I inspected the wound there was a transverse based incision with necrotic appearing tissues proximally and distally in a longitudinally orientation.  The site of the prior punch biopsy is located more posteriorly.  There is associated erythema and induration of the skin distally around the site, and given this the concern regarding this appearance is of infection vs tumor burden.  Given the patient's history of excision and this rapidly progressing wound I do not feel it is appropriate to treat this without a coordinated care team consisting of an oncologist, orthopedist, and possible plastic surgery for coverage given the location of the wound and the appearance.  For all these reasons I do recommend transfer to a higher level of care.  I discussed this with Dr. Mays over the phone.      Lb Mayers MD  [facility]    Met with Kelly Leyva's parent to discuss concerns regarding current family stressors that are significantly impacting parent's home carry over. Parent reported feeling overwhelmed with Cezars escaping and attention seeking behaviors during remote learning, specifically after spending over 45 min in class. Mother indicated that Kelly needs to be in the high chair in order for parent to have better control over her. Therapist discuss providing Kelly with sensory breaks, similar to those that we have learned and practiced in session, so she can generalize gains to other settings. Mother was receptive and agreed.      Assessment/Plan:  Parent to implement behavior strategies discussed in session. Continue with weekly services to address Cezars attention and compliance during non-prefered activities.

## 2024-06-23 NOTE — PHARMACY-VANCOMYCIN DOSING SERVICE
Pharmacy Vancomycin Note  Date of Service 2024  Patient's  1940   84 year old, female    Indication: Abscess and Skin and Soft Tissue Infection  Day of Therapy: Started   Current vancomycin regimen:  1000 mg IV q24h  Current vancomycin monitoring method: AUC  Current vancomycin therapeutic monitoring goal: 400-600 mg*h/L    Insight[employer] Prediction of Current Vancomycin Regimen  Loading dose: N/A  Regimen: 1000 mg IV every 24 hours.  Start time: 10:32 on 2024  Exposure target: AUC24 (range)400-600 mg/L.hr   AUC24,ss: 646 mg/L.hr  Probability of AUC24 > 400: 100 %  Ctrough,ss: 21.6 mg/L  Probability of Ctrough,ss > 20: 70 %  Probability of nephrotoxicity (Lodise DELMER ): 20 %      Current estimated CrCl = Estimated Creatinine Clearance: 27.7 mL/min (A) (based on SCr of 1.54 mg/dL (H)).    Creatinine for last 3 days  2024:  5:37 AM Creatinine 1.31 mg/dL  2024:  7:05 AM Creatinine 1.35 mg/dL  2024:  7:08 AM Creatinine 1.54 mg/dL    Recent Vancomycin Levels (past 3 days)  2024:  7:08 AM Vancomycin 20.6 ug/mL    Vancomycin IV Administrations (past 72 hours)                     vancomycin (VANCOCIN) 1,000 mg in NaCl 0.9% 200 mL intermittent infusion (mg) 1,000 mg Given 24 1032     1,000 mg Given 24 0935     1,000 mg Given 24 1005                    Nephrotoxins and other renal medications (From now, onward)      Start     Dose/Rate Route Frequency Ordered Stop    24 1000  vancomycin (VANCOCIN) 1,000 mg in 200 mL dextrose intermittent infusion         1,000 mg  200 mL/hr over 1 Hours Intravenous EVERY 24 HOURS 24                 Contrast Orders - past 72 hours (72h ago, onward)      None            Interpretation of levels and current regimen:  Vancomycin level is reflective of -600    Has serum creatinine changed greater than 50% in last 72 hours: No, but has been trending up    Urine output:  unable to determine    Renal Function:  Worsening    InsightRX Prediction of Planned New Vancomycin Regimen  Loading dose: N/A  Regimen: 750 mg IV every 24 hours.  Start time: 10:32 on 06/23/2024  Exposure target: AUC24 (range)400-600 mg/L.hr   AUC24,ss: 495 mg/L.hr  Probability of AUC24 > 400: 97 %  Ctrough,ss: 16.6 mg/L  Probability of Ctrough,ss > 20: 11 %  Probability of nephrotoxicity (Lodise DELMER 2009): 12 %      Plan:  Decrease Dose to 750 mg IV every 24 hours  Vancomycin monitoring method: AUC  Vancomycin therapeutic monitoring goal: 400-600 mg*h/L  Pharmacy will check vancomycin levels as appropriate in 1-3 Days.  Serum creatinine levels will be ordered daily for the first week of therapy and at least twice weekly for subsequent weeks.    Sue Reed, [facility]

## 2024-06-24 ENCOUNTER — TRANSFERRED RECORDS (OUTPATIENT)
Dept: HEALTH INFORMATION MANAGEMENT | Facility: CLINIC | Age: 84
End: 2024-06-24

## 2024-06-24 LAB
ANION GAP SERPL CALCULATED.3IONS-SCNC: 14 MMOL/L (ref 7–15)
ATRIAL RATE - MUSE: 81 BPM
BUN SERPL-MCNC: 38.7 MG/DL (ref 8–23)
CALCIUM SERPL-MCNC: 9.1 MG/DL (ref 8.8–10.2)
CHLORIDE SERPL-SCNC: 100 MMOL/L (ref 98–107)
CREAT SERPL-MCNC: 1.79 MG/DL (ref 0.51–0.95)
CREAT SERPL-MCNC: 1.79 MG/DL (ref 0.51–0.95)
CRP SERPL-MCNC: 177.21 MG/L
DEPRECATED HCO3 PLAS-SCNC: 23 MMOL/L (ref 22–29)
DIASTOLIC BLOOD PRESSURE - MUSE: NORMAL MMHG
EGFRCR SERPLBLD CKD-EPI 2021: 28 ML/MIN/1.73M2
EGFRCR SERPLBLD CKD-EPI 2021: 28 ML/MIN/1.73M2
GLUCOSE BLDC GLUCOMTR-MCNC: 101 MG/DL (ref 70–99)
GLUCOSE BLDC GLUCOMTR-MCNC: 115 MG/DL (ref 70–99)
GLUCOSE BLDC GLUCOMTR-MCNC: 175 MG/DL (ref 70–99)
GLUCOSE BLDC GLUCOMTR-MCNC: 194 MG/DL (ref 70–99)
GLUCOSE SERPL-MCNC: 108 MG/DL (ref 70–99)
HOLD SPECIMEN: NORMAL
INR PPP: 2.67 (ref 0.85–1.15)
INTERPRETATION ECG - MUSE: NORMAL
P AXIS - MUSE: 80 DEGREES
POTASSIUM SERPL-SCNC: 5.2 MMOL/L (ref 3.4–5.3)
PR INTERVAL - MUSE: 150 MS
QRS DURATION - MUSE: 128 MS
QT - MUSE: 372 MS
QTC - MUSE: 432 MS
R AXIS - MUSE: -52 DEGREES
SODIUM SERPL-SCNC: 137 MMOL/L (ref 135–145)
SYSTOLIC BLOOD PRESSURE - MUSE: NORMAL MMHG
T AXIS - MUSE: 40 DEGREES
VENTRICULAR RATE- MUSE: 81 BPM
WBC # BLD AUTO: 15.2 10E3/UL (ref 4–11)

## 2024-06-24 PROCEDURE — 85610 PROTHROMBIN TIME: CPT | Performed by: FAMILY MEDICINE

## 2024-06-24 PROCEDURE — 85048 AUTOMATED LEUKOCYTE COUNT: CPT | Performed by: INTERNAL MEDICINE

## 2024-06-24 PROCEDURE — 250N000011 HC RX IP 250 OP 636: Mod: JZ | Performed by: STUDENT IN AN ORGANIZED HEALTH CARE EDUCATION/TRAINING PROGRAM

## 2024-06-24 PROCEDURE — 36415 COLL VENOUS BLD VENIPUNCTURE: CPT | Performed by: FAMILY MEDICINE

## 2024-06-24 PROCEDURE — 99233 SBSQ HOSP IP/OBS HIGH 50: CPT | Performed by: INTERNAL MEDICINE

## 2024-06-24 PROCEDURE — 999N000197 HC STATISTIC WOC PT EDUCATION, 0-15 MIN

## 2024-06-24 PROCEDURE — 250N000013 HC RX MED GY IP 250 OP 250 PS 637: Performed by: INTERNAL MEDICINE

## 2024-06-24 PROCEDURE — 258N000003 HC RX IP 258 OP 636: Mod: JZ | Performed by: INTERNAL MEDICINE

## 2024-06-24 PROCEDURE — 82565 ASSAY OF CREATININE: CPT | Performed by: STUDENT IN AN ORGANIZED HEALTH CARE EDUCATION/TRAINING PROGRAM

## 2024-06-24 PROCEDURE — 250N000011 HC RX IP 250 OP 636: Performed by: STUDENT IN AN ORGANIZED HEALTH CARE EDUCATION/TRAINING PROGRAM

## 2024-06-24 PROCEDURE — 99233 SBSQ HOSP IP/OBS HIGH 50: CPT | Performed by: PHYSICIAN ASSISTANT

## 2024-06-24 PROCEDURE — 120N000002 HC R&B MED SURG/OB UMMC

## 2024-06-24 PROCEDURE — 80048 BASIC METABOLIC PNL TOTAL CA: CPT | Performed by: INTERNAL MEDICINE

## 2024-06-24 PROCEDURE — 250N000013 HC RX MED GY IP 250 OP 250 PS 637

## 2024-06-24 PROCEDURE — 250N000013 HC RX MED GY IP 250 OP 250 PS 637: Performed by: FAMILY MEDICINE

## 2024-06-24 PROCEDURE — 86140 C-REACTIVE PROTEIN: CPT | Performed by: INTERNAL MEDICINE

## 2024-06-24 PROCEDURE — 258N000003 HC RX IP 258 OP 636: Mod: JZ | Performed by: STUDENT IN AN ORGANIZED HEALTH CARE EDUCATION/TRAINING PROGRAM

## 2024-06-24 RX ORDER — HONEY 100 %
PASTE (ML) TOPICAL DAILY
Status: DISCONTINUED | OUTPATIENT
Start: 2024-06-24 | End: 2024-06-28

## 2024-06-24 RX ADMIN — PIPERACILLIN AND TAZOBACTAM 3.38 G: 3; .375 INJECTION, POWDER, LYOPHILIZED, FOR SOLUTION INTRAVENOUS at 00:28

## 2024-06-24 RX ADMIN — PIPERACILLIN AND TAZOBACTAM 3.38 G: 3; .375 INJECTION, POWDER, LYOPHILIZED, FOR SOLUTION INTRAVENOUS at 13:58

## 2024-06-24 RX ADMIN — SIMETHICONE 226 ML: 125 CAPSULE, LIQUID FILLED ORAL at 19:18

## 2024-06-24 RX ADMIN — PIPERACILLIN AND TAZOBACTAM 3.38 G: 3; .375 INJECTION, POWDER, LYOPHILIZED, FOR SOLUTION INTRAVENOUS at 23:51

## 2024-06-24 RX ADMIN — OXYCODONE HYDROCHLORIDE 5 MG: 5 TABLET ORAL at 05:08

## 2024-06-24 RX ADMIN — METHOCARBAMOL 500 MG: 500 TABLET ORAL at 22:20

## 2024-06-24 RX ADMIN — OXYCODONE HYDROCHLORIDE 5 MG: 5 TABLET ORAL at 20:15

## 2024-06-24 RX ADMIN — SENNOSIDES AND DOCUSATE SODIUM 2 TABLET: 50; 8.6 TABLET ORAL at 12:41

## 2024-06-24 RX ADMIN — ACETAMINOPHEN 650 MG: 325 TABLET, FILM COATED ORAL at 08:19

## 2024-06-24 RX ADMIN — METOPROLOL SUCCINATE 25 MG: 25 TABLET, FILM COATED, EXTENDED RELEASE ORAL at 08:20

## 2024-06-24 RX ADMIN — MICONAZOLE NITRATE: 20 POWDER TOPICAL at 21:44

## 2024-06-24 RX ADMIN — AMLODIPINE BESYLATE 5 MG: 5 TABLET ORAL at 08:20

## 2024-06-24 RX ADMIN — ACETAMINOPHEN 650 MG: 325 TABLET, FILM COATED ORAL at 22:20

## 2024-06-24 RX ADMIN — SODIUM CHLORIDE, POTASSIUM CHLORIDE, SODIUM LACTATE AND CALCIUM CHLORIDE 500 ML: 600; 310; 30; 20 INJECTION, SOLUTION INTRAVENOUS at 10:38

## 2024-06-24 RX ADMIN — MICONAZOLE NITRATE: 20 POWDER TOPICAL at 08:21

## 2024-06-24 RX ADMIN — PRAVASTATIN SODIUM 40 MG: 10 TABLET ORAL at 21:44

## 2024-06-24 RX ADMIN — METHOCARBAMOL 500 MG: 500 TABLET ORAL at 02:26

## 2024-06-24 RX ADMIN — Medication: at 18:46

## 2024-06-24 RX ADMIN — INSULIN ASPART 1 UNITS: 100 INJECTION, SOLUTION INTRAVENOUS; SUBCUTANEOUS at 22:25

## 2024-06-24 RX ADMIN — PIPERACILLIN AND TAZOBACTAM 3.38 G: 3; .375 INJECTION, POWDER, LYOPHILIZED, FOR SOLUTION INTRAVENOUS at 18:46

## 2024-06-24 RX ADMIN — VANCOMYCIN HYDROCHLORIDE 750 MG: 1 INJECTION, POWDER, LYOPHILIZED, FOR SOLUTION INTRAVENOUS at 12:41

## 2024-06-24 RX ADMIN — PIPERACILLIN AND TAZOBACTAM 3.38 G: 3; .375 INJECTION, POWDER, LYOPHILIZED, FOR SOLUTION INTRAVENOUS at 05:08

## 2024-06-24 RX ADMIN — INSULIN GLARGINE 10 UNITS: 100 INJECTION, SOLUTION SUBCUTANEOUS at 22:20

## 2024-06-24 ASSESSMENT — ACTIVITIES OF DAILY LIVING (ADL)
ADLS_ACUITY_SCORE: 42
ADLS_ACUITY_SCORE: 44
ADLS_ACUITY_SCORE: 42
ADLS_ACUITY_SCORE: 53
ADLS_ACUITY_SCORE: 42

## 2024-06-24 NOTE — PLAN OF CARE
Goal Outcome Evaluation:      Plan of Care Reviewed With: patient    Overall Patient Progress: no change       VS: /47 (BP Location: Right arm, Patient Position: Supine, Cuff Size: Adult Regular)   Pulse 79   Temp 98.6  F (37  C) (Oral)   Resp 16   SpO2 99%      O2: SpO2 > 92% at room air    Output: Has schilling catheter for chronic retention with adequate output   Last BM: Pt reported about a week ago    Activity: Sat up in bed.   Skin: R lower leg cellulitis/ shin wound  Scattered bruising on BUE  Redness to abd folds and groin area. - Micatin powder BID 06//24    Pain: Managed by prn oxycodone and robaxin   CMS: CMS and neuro intact to baseline   Dressing: CDI. Dressing changes daily.   Nurse requested supply of medhoney.(Place in pt yellow bin)   Diet: Regular. BG check- 128, 115   LDA: R PPIV, SL after antibiotic.    Equipment: IV pole and personal belongings.   Plan: TBD   Additional Info:

## 2024-06-24 NOTE — PROGRESS NOTES
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 Patient     was admitted to [facility] 6/14/2024  with right leg cellulitis . MRI there showed superficial soft tissue edema  repeat MRI 6/21 now with new fluid collection  and possible anterior tibialis tenosynovitis despite being on IV ceftriaxone and IV vancomycin . Wound culture grew MRSA  .  Patient  was transferred to [facility] 6/22/24 for  orthopedic surgery   consultation and possible surgery .       Per Dr Batres :\"   Dr. Mays from orthopaedics discussed with [facility] team and patient accepted to [facility] for medicine admission with orthopaedic surgery consultation on [facility]. \"      6/26-  - Orthopedic surgery  and plastic surgery will discuss reg next steps, if needs surgery then will need to know INR goal as INR still at 2.67 . Can use vit K if know afear, transfusion consent signed in case needs FFP   - also adjust insulin  to NPO status as needed   - creatinine up, check vancomycin  level, also gave fluid bolus    Addendum:   Per plastic surgery : \"-Plastic surgery will continue to follow, however if erythema and pain do not improve, will recommend that Ortho I&D shin as plastics will not manage lower extremity tendon involvement \"        Periop eval:  Revised cardiac risk index (RCRI)   Hypertension :   1     Patient  also with T2DM   paroxysmal atrial fibrillation    on chronic warfarin INR 6/23/24 2.98  CKD   Dyslipidemia    EKG : sinus rhythm, RBBB that was also present in 2022   Echo in 2019 with EF 55% , no hemodynamically significant valvular abnormality       MRSA infection  Right " lower extremity skin cancer extraction ~6/4/2024.  Pathology is showing hypertrophic lichen planus with squamous cell carcinoma with unclear margins. 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  -Since admission has been receiving vancomycin.  6/18 ceftriaxone was added ID rec to stop ceftriaxone , start Zosyn for pseudomonal coverage   - monitor daily WBC, CRP q 2 days until trending down , consider repeat XR in a week ot 2   - ID and orthopedics consultation appreciated.  -6/19 wound culture showing MRSA.  Placed on contact precautions.  -6/21 repeat MRI showed fluid collection 1.2 x 1 x 2.2 cm which appears to be communicating with the anterior tibialis I concerning for possivle septic tenosynovitis  -.Per ID note, patient started having purulent drainage on exam on 6/19, culture sent.      -  [facility] [facility]  rec transfer to [facility]  for  possible washout in light of abscess despite receiving appropriate antibiotics.  - ---92.9---104--->168 --->177.21  - patient  seen by orthopedic surgery , I asked orthopedic surgery  to comment on the possible septic tenosynovitis and also ant compartment myositis , per orthopedic surgery   they feel this is likely due to the fluid collection from wound excision ,  ortho deferred back to plastic surgery  - discussed with  plastic surgery  and they will discussed with  orthopedic surgery , no plans for OR for today   -CK was 106 6/14, repeat  6/23 25   - monitor for  compartment syndrome but no evidence at this point   - orthopedic surgery  recommended plastic surgical  consult   - please notify IM if and when surgery Is planned and goal INR for potential surgery    -WOC to see      MRI   6/21/24 : 1.  Open wound redemonstrated anterolateral to the midshaft right tibia with adjacent cellulitis.  2.  Defined subcutaneous fluid collection deep to the wound in the anterior mid right leg soft tissues along the superficial muscle belly of the anterior tibialis, approximately 1.2 x 1.0 x 2.2 cm.  3.  The collection appears to communicate with the anterior tibialis greater than extensor digitorum longus tendon sheaths, concerning for septic tenosynovitis.  4.  Likely anterior compartment myositis right leg near the wound.  5.  No evidence for right tibia or fibula acute osteomyelitis.        Chronic atrial fibrillation  Chronic anticoagulation with warfarin  - continue  metoprolol with holding parameters   -[id number] score of at least 5 per cardiology    - hold warfarin, last warfarin dose was 6/21   - INR for 6/23 now 2.98, no immediate plans for OR so no need to reverse at this point, continue to hold  warfarin and let INR drift down and monitor INR    - did get transfusion consent signed in case FFP is needed ,   - echo form 9/2019 shows EF 55%     Essential hypertension  Continue home Norvasc and metoprolol with holding parameters .     LIZZ on Chronic kidney disease stage IIIb  -Creatinine holding stable at baseline around 1.3. now went form 1.54---1.79  - creatinine now 1.54  -avoid nephrotoxic agents, adjust vancomycin    - monitor  BMP.    Pulmonary nodule  - noted on CT   4x6 mm noncalcified RMD, will need follow up        Type 2 diabetes  Admission hemoglobin A1c 7.  Glucose under reasonable control values around 150.  Glipizide held since admission.  Continue home Lantus 10 units nightly and adjust as needed      Hyperlipidemia  Continue home Pravachol.     Mild cognitive impairment  Family notes that patient has been a bit more confused than her usual baseline since admission.  May be related to oxycodone.  OT consulted for slums assessment.       GERD  -was evaluated in 2013 by GI as had some  "enlarged  adenopathy around stomach/esophagus , status post  FNA and no malognacy noted     Possibility of vasculitis   - CT 4/16/2013 showed thickening of left gastric artery  with gen mild luminal narrowing , was seen by rheumatology, HILARY was + 1:160    Adenopathy  - noted on CT chest 3/5/2013  \" Adenopathy present around the distal esophagus, pericardium and upper abdomen of unclear etiology. There is thickening of the distal esophageal mucosa conceivably this could be related to an esophageal tumor. adenopathy around \", emerson FENA             Diet: Snacks/Supplements Adult: Glucerna; With Meals  Regular Diet Adult    DVT Prophylaxis: Warfarin  Villalobos Catheter: PRESENT, indication: Acute retention or obstruction  Lines: None     Cardiac Monitoring: None  Code Status: No CPR- Do NOT Intubate      Clinically Significant Risk Factors Present on Admission               # Drug Induced Coagulation Defect: home medication list includes an anticoagulant medication    # Hypertension: Noted on problem list            # DMII: A1C = 7.0 % (Ref range: <5.7 %) within past 6 months   # Overweight: Estimated body mass index is 26.86 kg/m  as calculated from the following:    Height as of 6/14/24: 1.651 m (5' 5\").    Weight as of 6/14/24: 73.2 kg (161 lb 6.4 oz).         # Financial/Environmental Concerns: none         Disposition Plan     Medically Ready for Discharge: Anticipated in 2-4 Days             Sammi Araujo MD  Hospitalist Service, GOLD TEAM 18  [facility]  Securely message with [employer] (more info)  Text page via [facility] Paging/Directory   See signed in provider for up to date coverage information  ______________________________________________________________________    Interval History   Doing ok, still some pain in leg but better , no chest pain  no shortness of breath  no nausea vomiting   Physical Exam   Vital Signs: Temp: 98.6  F (37  C) Temp src: Oral BP: 126/47 Pulse: " 79   Resp: 16 SpO2: 95 % O2 Device: None (Room air) Oxygen Delivery: 1 LPM  Weight: 0 lbs 0 oz  General appearence: awake alert  in  no apparent distress  RESPIRATORY: lungs clear    CARDIOVASCULAR:S1 S2 regular rate and rhythm, no rubs gallops or murmurs appreciated  GASTROINTESTINAL:soft, non-distended , non-tender , + bowel sounds,   SKIN: warm and dry, no mottling noted   NEUROLOGIC; awake alert and oriented, no focal deficits found  EXTREMITIES: no clubbing, cyanosis or edema ,  able to wigged toes in right, no calf tenderness , calf soft , see images of wound      6/23:            Data     I have personally reviewed the following data over the past 24 hrs:    N/A  \   N/A   / N/A     N/A N/A N/A /  115 (H)   N/A N/A N/A \     INR:  N/A PTT:  N/A   D-dimer:  N/A Fibrinogen:  N/A       Imaging results reviewed over the past 24 hrs:   No results found for this or any previous visit (from the past 24 hour(s)).

## 2024-06-24 NOTE — PLAN OF CARE
On call hospitalist paged on request for order of micatin powder. Patient has bilateral groin area redness, soreness and itching. Patient reported she had those long time ago/months. Redness also present in abdominal folds.

## 2024-06-24 NOTE — PROGRESS NOTES
Hospitalist cross cover note      I was notified about urinary retention in this patient who has a Villalobos catheter that was placed at the time of admission.      She does have a chronic kidney disease.    It appears that there is an acute kidney injury in the setting of chronic kidney disease.  She is not hypotensive.  Afebrile.  She was given  mL bolus this afternoon.    The RN told me that bladder scan revealed 999 mL of urine in the bladder.  About 326 cc of urine was emptied from the bladder this afternoon.  She does not have approximately 75 cc urine in the urinary bladder when I came to see her.    The bladder is distended up to the umbilicus level.  Standard to palpation.  Abdomen is otherwise soft and nondistended.      I suspected that the Villalobos is distracted causing obstructive uropathy which may be the cause of her acute kidney injury.  Villalobos catheter is irrigated with saline and it started draining urine.  enema was also ordered as patient is constipated.  She has not moved her bowels for about 8 days    Impression  Acute kidney injury in the setting of chronic kidney disease likely due to obstructive uropathy, acute urinary retention due to obstructive Villalobos catheter      Discussed with RN

## 2024-06-24 NOTE — PLAN OF CARE
Goal Outcome Evaluation:      Plan of Care Reviewed With: other (see comments) (TCU Admissions)          Outcome Evaluation: Pt has bed-hold at [facility]. BRYSON unknonw at this time.

## 2024-06-24 NOTE — PROGRESS NOTES
[facility] ID Service: Follow Up Note      Patient:  Francy Sherman   Date of birth 1940, Medical record number [id number]  Date of Visit:  06/24/2024  Date of Admission: 6/22/2024         Assessment and Recommendations:   ID Problem List:  RLE cellulitis, MRSA on culture  Non-healing anterior shin wound s/p recent biopsies  MRI w/ fluid collection 1.2 x1x2.2cm, which appeared to be communicating with anterior tibialis, c/f possible septic tenosynovitis, likely anterior compartment myositis of right leg  Recent excisional skin bx RLE with hypertrophic lichen planus, squamous cell carcinoma with unclear margins (6/10/24)  Previous bx 6/4 resulted benign (possibly not deep enough)  C/f high-grade tear of the anterior talofibular ligament, likely chronic  Elevated inflammatory markers  Type II DM  CKD3  A.fib on warfarin      Recommendations:  Continue vancomycin  Continue Zosyn at anti-Pseudomonas dosing  Monitor renal function  Follow daily WBC and CRP q48hrs    Discussion:  Francy Sherman is an 84 year old female with history of type II DM, CKD3, and a.fib on warfarin, recent skin biopsy with SCC who was initially admitted to [facility] with cellulitis at biopsy site.    Culture with MRSA, was started on vancomycin on 6/14. Has had ongoing purulence and erythema at site with elevated inflammatory markers. Ceftriaxone was added 6/19. MRI 6/21 with fluid collection 1.2 x1x2.2cm, which appeared to be communicating with anterior tibialis, c/f possible septic tenosynovitis, likely anterior compartment myositis of right leg. Plastic surgery and orthopedics consulted, no plan for surgery at this time unless worsening exam findings.    Antibiotics expanded to vancomycin + Zosyn. Has gradual improvement. Cr rising, Vanc/ Zosyn combination can cause a transient elevation in Cr while on it - expert opinion suggests this is due to tubular secretion effect and not a refection of actual reduced kidney function, would  monitor. If continuing to rise on 6/25 may need to change to different agent(s)        Recs were discussed with primary team today. Don't hesitate to call with questions.     Attestation:  I have reviewed today's vital signs, medications, labs and imaging.    Patricia Murray PA-C  Pronouns: she/her/hers  Infectious Diseases  Contact via [employer] or [employer] Paging/Directory       50 MINUTES SPENT BY ME on the date of service doing chart review, history, exam, documentation & further activities per the note.             Interval History:       Afebrile, VSS. CR 1.54 -->1.79. -->177.   Less redness. Pain improving, was able to stand earlier today. Feels like things are gradually improving. No new fevers, chills, nausea, vomiting, diarrhea or rashes.         Current Antimicrobials   Current:  - vancomycin 6/14-present  - Zosyn 6/23 - present    Prior:  - ceftriaxone 6/18-6/23  - cefepime 6/14         Physical Exam:   Ranges for vital signs:  Temp:  [97.8  F (36.6  C)-98.6  F (37  C)] 97.8  F (36.6  C)  Pulse:  [68-79] 68  Resp:  [16] 16  BP: (122-126)/(46-56) 124/56  SpO2:  [95 %-99 %] 96 %    Intake/Output Summary (Last 24 hours) at 6/24/2024 1531  Last data filed at 6/23/2024 2243  Gross per 24 hour   Intake --   Output 400 ml   Net -400 ml     Exam:  GENERAL:  well-developed, well-nourished, supine in bed in no acute distress.   LUNGS:  Normal respiratory effort on RA.  ABDOMEN:  Normal bowel sounds, soft, nontender.  EXT: Extremities warm. No LLE edema. Trace R pedal edema. DP pulses intact bilaterally.  SKIN:  No acute rashes, jaundice, or diaphoresis. R shin with scab/eschar, no drainage. Erythema withdrawing from previously marked border and mildly improved from photo AM of 6/24.  Line is in place without any surrounding erythema.  NEUROLOGIC:  Active x4 extremities             Laboratory Data:   Reviewed.  Pertinent for:    Culture data:  Culture   Date Value Ref Range Status   06/19/2024 3+ Staphylococcus  aureus MRSA (A)  Final   06/14/2024 No Growth  Final       Inflammatory Markers    Recent Labs   Lab Test 06/24/24  0733 06/22/24  0857 06/21/24  0537 06/19/24  0707 06/15/24  1158   SED  --  88* 82* 66* 58*   CRPI 177.21* 168.00* 104.00* 92.90* 146.00*       Hematology Studies    Recent Labs   Lab Test 06/24/24  0733 06/23/24  0708 06/22/24  0857 06/17/24  0746 06/15/24  0631 06/14/24  0744 04/18/22  1541   WBC 15.2* 16.6*  --  10.3 13.9* 17.0* 8.8   HGB  --   --  10.8* 10.8* 11.4* 12.0 13.5   MCV  --   --   --  95 94 96 94   PLT  --   --   --  201 162 141* 204     No lab results found.    Metabolic Studies     Recent Labs   Lab Test 06/24/24  0733 06/23/24  0708 06/22/24  0705 06/21/24  0537 06/17/24  0746 06/16/24  0856 06/15/24  0631 06/14/24  0744     --   --   --   --  138 138 138   POTASSIUM 5.2  --   --   --   --  4.5 5.0 4.1   CHLORIDE 100  --   --   --   --  105 105 104   CO2 23  --   --   --   --  21* 23 20*   BUN 38.7*  --   --   --   --  33.7* 32.4* 41.2*   CR 1.79*  1.79* 1.54* 1.35* 1.31*   < > 1.26* 1.26* 1.62*   GFRESTIMATED 28*  28* 33* 39* 40*   < > 42* 42* 31*    < > = values in this interval not displayed.       Hepatic Studies    Recent Labs   Lab Test 06/16/24 0856 06/15/24  0631 06/14/24  0744   BILITOTAL 0.5 0.4 0.7   ALKPHOS 115 120 89   ALBUMIN 3.2* 3.4* 3.7   AST 50* 99* 59*   ALT 97* 143* 72*            Imaging:     MRI Tibia Fibula RLE w/o & w/ contrast 6/21/24  IMPRESSION:  1.  Open wound redemonstrated anterolateral to the midshaft right tibia with adjacent cellulitis.  2.  Defined subcutaneous fluid collection deep to the wound in the anterior mid right leg soft tissues along the superficial muscle belly of the anterior tibialis, approximately 1.2 x 1.0 x 2.2 cm.  3.  The collection appears to communicate with the anterior tibialis greater than extensor digitorum longus tendon sheaths, concerning for septic tenosynovitis.  4.  Likely anterior compartment myositis right leg  near the wound.  5.  No evidence for right tibia or fibula acute osteomyelitis.

## 2024-06-24 NOTE — PROGRESS NOTES
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  Patient has climbing WBC and worsening rubor on exam today, will continue to follow with wound care.    -Plastic surgery will continue to follow, however if erythema and pain do not improve, will recommend that Ortho I&D shin as plastics will not manage lower extremity tendon involvement  -Wound care to R shin daily  1. Soak wound with vashe moistened gauze for 5 minutes, pat dry  2. Apply nickel thick layer of medihoney gel to wound  3. Cover with mepilex  4. Change dressing daily + PRN if soiled, saturated, falls off    Sussy Cole MD- PGY1  Plastic Surgery Resident  Pager [phone]  ------------------------------------------------------------    S/24h: No acute events overnight. Patient reports pain along the dorsum of the foot.    O: /56 (BP Location: Right arm, Patient Position: Semi-Vital's)   Pulse 68   Temp 97.8  F (36.6  C) (Axillary)   Resp 16   SpO2 96%    General: NAD, following commands  HEENT: pupils equal and reactive  CV: RRR  Pulm: Non labored breathing  RLE ttp over mid shin, macerated wound 2cm long x 1 cm wide, fibrinous material in central wound. no fluid expressed, shin and foot are swollen however no appreciable fluctuance, no palpable superficial abscess. Worsening rubor distal to the wound with moderate induration      BMP  Recent Labs   Lab 06/24/24  0852 06/24/24  0733 06/24/24  0208 06/23/24  2156 06/23/24  0726 06/23/24  0708 06/22/24  0835 06/22/24  0705 06/21/24  0632 06/21/24  0537   FREDERICK  --  137  --   --   --   --   --   --   --   --     POTASSIUM  --  5.2  --   --   --   --   --   --   --   --    CHLORIDE  --  100  --   --   --   --   --   --   --   --    CO2  --  23  --   --   --   --   --   --   --   --    BUN  --  38.7*  --   --   --   --   --   --   --   --    CR  --  1.79*  1.79*  --   --   --  1.54*  --  1.35*  --  1.31*   * 108* 115* 128*   < >  --    < >  --    < >  --     < > = values in this interval not displayed.     CBC  Recent Labs   Lab 06/24/24  0733 06/23/24  0708 06/22/24  0857   WBC 15.2* 16.6*  --    HGB  --   --  10.8*

## 2024-06-24 NOTE — PLAN OF CARE
Goal Outcome Evaluation:      Plan of Care Reviewed With: patient    Overall Patient Progress: improvingOverall Patient Progress: improving    Outcome Evaluation: Encouraged by standing. No BM in over one week, prn senna given. Ortho an plastics are determining if surgery needed.

## 2024-06-24 NOTE — PROGRESS NOTES
VS: /56 (BP Location: Right arm, Patient Position: Semi-Vital's)   Pulse 68   Temp 97.8  F (36.6  C) (Axillary)   Resp 16   SpO2 96%         O2: SpO2 > 92% at room air    Output: Has schilling catheter for chronic retention with adequate output   Last BM: Pt reported about a week ago, senna prn given   Activity: Sat up in bed. Stood at edge of bed.   Skin: R lower leg cellulitis/ shin wound  Scattered bruising on BUE  Redness to abd folds and groin area. - Micatin powder BID     Pain: Managed by prn oxycodone    CMS: CMS and neuro intact to baseline   Dressing: CDI. Dressing changes daily per POC.   Diet: Regular. BG check   LDA: R PIV, TKO after antibiotic.    Equipment: IV pole and personal belongings.   Plan: TBD surgery vs no surgery   Additional Info:

## 2024-06-24 NOTE — PROVIDER NOTIFICATION
Villalobos catheter irrigated and pt reporting significant relief. Still reports some discomfort from constipation, paged medicine to inform and requested enema.

## 2024-06-24 NOTE — CONSULTS
WOC Consult    S: WOC Consult to evaluate and treat right lower leg wound.     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  Orthopedic surgery is attributing the tenosynovitis to the deep abscess within the anterior tibialis, therefore plastic surgery was consulted to evaluate the wound due to soft tissue involvement.     A: Plastic Surgery following patient with wound care orders in place.     R: WOC will sign off.     Perla Metcalf RN, CWOCN  Available via [employer] sandi: [facility] WOC  Office: *[phone]

## 2024-06-24 NOTE — PROGRESS NOTES
Brief Cross Cover Note:     RN messaged regarding redness/itching in groin area w/ request for micatin powder. Ordered    Hannah Villatoro MD

## 2024-06-24 NOTE — PROGRESS NOTES
Care Management Follow Up    Length of Stay (days): 1    Expected Discharge Date: TBD     Concerns to be Addressed: discharge planning     Patient plan of care discussed at interdisciplinary rounds: Yes    Anticipated Discharge Disposition:  (Return to TCU)    [facility]   [address] [place] [address]  Admissions: [phone]  Fax: [phone]     Anticipated Discharge Services:  (Post acute therapies; IV abx)  Anticipated Discharge DME: None    Patient/family educated on Medicare website which has current facility and service quality ratings: no  Education Provided on the Discharge Plan: Yes  Patient/Family in Agreement with the Plan: yes    Referrals Placed by CM/SW:  (None at this time)  Private pay costs discussed: Not at this time    Additional Information:  Per IDT rounds, next steps are TBD, pending Ortho and Plastics.    IBRAHIMA spoke to Zoila in Admissions at [facility], who confirmed that pt has a bed-hold at their TCU. SW provided update of unknown BRYSON. Zoila requested updates of pt's status/BRYSON.    SW will continue to follow for discharge/return to TCU.          BRAYAN CabreraW, LSW  5 Ortho   PHONE: [phone]    SW Coverage SEARCHABLE in [facility] - search 5 Ortho SW  (or by name)  Or click on link below:  [facility]

## 2024-06-24 NOTE — PROVIDER NOTIFICATION
Pt is reporting 10/10 abdominal pain and is requesting to have schilling removed, medicine notified and requested order for removal.

## 2024-06-25 LAB
ANION GAP SERPL CALCULATED.3IONS-SCNC: 12 MMOL/L (ref 7–15)
BASOPHILS # BLD AUTO: 0.1 10E3/UL (ref 0–0.2)
BASOPHILS NFR BLD AUTO: 1 %
BUN SERPL-MCNC: 31 MG/DL (ref 8–23)
CALCIUM SERPL-MCNC: 8.9 MG/DL (ref 8.8–10.2)
CHLORIDE SERPL-SCNC: 105 MMOL/L (ref 98–107)
CREAT SERPL-MCNC: 1.57 MG/DL (ref 0.51–0.95)
DEPRECATED HCO3 PLAS-SCNC: 24 MMOL/L (ref 22–29)
EGFRCR SERPLBLD CKD-EPI 2021: 32 ML/MIN/1.73M2
EOSINOPHIL # BLD AUTO: 0.2 10E3/UL (ref 0–0.7)
EOSINOPHIL NFR BLD AUTO: 1 %
ERYTHROCYTE [DISTWIDTH] IN BLOOD BY AUTOMATED COUNT: 11.9 % (ref 10–15)
GLUCOSE BLDC GLUCOMTR-MCNC: 109 MG/DL (ref 70–99)
GLUCOSE BLDC GLUCOMTR-MCNC: 136 MG/DL (ref 70–99)
GLUCOSE BLDC GLUCOMTR-MCNC: 156 MG/DL (ref 70–99)
GLUCOSE BLDC GLUCOMTR-MCNC: 158 MG/DL (ref 70–99)
GLUCOSE BLDC GLUCOMTR-MCNC: 158 MG/DL (ref 70–99)
GLUCOSE BLDC GLUCOMTR-MCNC: 184 MG/DL (ref 70–99)
GLUCOSE BLDC GLUCOMTR-MCNC: 230 MG/DL (ref 70–99)
GLUCOSE SERPL-MCNC: 118 MG/DL (ref 70–99)
HCT VFR BLD AUTO: 31.9 % (ref 35–47)
HGB BLD-MCNC: 10 G/DL (ref 11.7–15.7)
HOLD SPECIMEN: NORMAL
IMM GRANULOCYTES # BLD: 0.1 10E3/UL
IMM GRANULOCYTES NFR BLD: 1 %
INR PPP: 2.48 (ref 0.85–1.15)
INR PPP: 2.64 (ref 0.85–1.15)
LYMPHOCYTES # BLD AUTO: 1.3 10E3/UL (ref 0.8–5.3)
LYMPHOCYTES NFR BLD AUTO: 10 %
MCH RBC QN AUTO: 29.8 PG (ref 26.5–33)
MCHC RBC AUTO-ENTMCNC: 31.3 G/DL (ref 31.5–36.5)
MCV RBC AUTO: 95 FL (ref 78–100)
MONOCYTES # BLD AUTO: 1.4 10E3/UL (ref 0–1.3)
MONOCYTES NFR BLD AUTO: 11 %
NEUTROPHILS # BLD AUTO: 9.8 10E3/UL (ref 1.6–8.3)
NEUTROPHILS NFR BLD AUTO: 76 %
NRBC # BLD AUTO: 0 10E3/UL
NRBC BLD AUTO-RTO: 0 /100
PLATELET # BLD AUTO: 324 10E3/UL (ref 150–450)
POTASSIUM SERPL-SCNC: 4.1 MMOL/L (ref 3.4–5.3)
RBC # BLD AUTO: 3.36 10E6/UL (ref 3.8–5.2)
SODIUM SERPL-SCNC: 141 MMOL/L (ref 135–145)
VANCOMYCIN SERPL-MCNC: 17.8 UG/ML
WBC # BLD AUTO: 12.9 10E3/UL (ref 4–11)

## 2024-06-25 PROCEDURE — 250N000011 HC RX IP 250 OP 636: Performed by: STUDENT IN AN ORGANIZED HEALTH CARE EDUCATION/TRAINING PROGRAM

## 2024-06-25 PROCEDURE — 36415 COLL VENOUS BLD VENIPUNCTURE: CPT | Performed by: INTERNAL MEDICINE

## 2024-06-25 PROCEDURE — 250N000013 HC RX MED GY IP 250 OP 250 PS 637: Performed by: FAMILY MEDICINE

## 2024-06-25 PROCEDURE — 80048 BASIC METABOLIC PNL TOTAL CA: CPT | Performed by: INTERNAL MEDICINE

## 2024-06-25 PROCEDURE — 85610 PROTHROMBIN TIME: CPT | Performed by: INTERNAL MEDICINE

## 2024-06-25 PROCEDURE — 120N000002 HC R&B MED SURG/OB UMMC

## 2024-06-25 PROCEDURE — 85610 PROTHROMBIN TIME: CPT | Performed by: FAMILY MEDICINE

## 2024-06-25 PROCEDURE — 250N000013 HC RX MED GY IP 250 OP 250 PS 637: Performed by: INTERNAL MEDICINE

## 2024-06-25 PROCEDURE — 258N000003 HC RX IP 258 OP 636: Mod: JZ | Performed by: STUDENT IN AN ORGANIZED HEALTH CARE EDUCATION/TRAINING PROGRAM

## 2024-06-25 PROCEDURE — 250N000011 HC RX IP 250 OP 636: Mod: JZ | Performed by: STUDENT IN AN ORGANIZED HEALTH CARE EDUCATION/TRAINING PROGRAM

## 2024-06-25 PROCEDURE — 99232 SBSQ HOSP IP/OBS MODERATE 35: CPT | Performed by: INTERNAL MEDICINE

## 2024-06-25 PROCEDURE — 80202 ASSAY OF VANCOMYCIN: CPT | Performed by: STUDENT IN AN ORGANIZED HEALTH CARE EDUCATION/TRAINING PROGRAM

## 2024-06-25 PROCEDURE — 99207 PR NO CHARGE LOS: CPT | Performed by: INTERNAL MEDICINE

## 2024-06-25 PROCEDURE — 85025 COMPLETE CBC W/AUTO DIFF WBC: CPT | Performed by: INTERNAL MEDICINE

## 2024-06-25 PROCEDURE — 99233 SBSQ HOSP IP/OBS HIGH 50: CPT | Performed by: PHYSICIAN ASSISTANT

## 2024-06-25 RX ORDER — LEVOFLOXACIN 250 MG/1
750 TABLET, FILM COATED ORAL EVERY OTHER DAY
Status: DISCONTINUED | OUTPATIENT
Start: 2024-06-25 | End: 2024-07-03 | Stop reason: HOSPADM

## 2024-06-25 RX ORDER — DOXYCYCLINE 100 MG/1
100 CAPSULE ORAL EVERY 12 HOURS SCHEDULED
Status: DISCONTINUED | OUTPATIENT
Start: 2024-06-25 | End: 2024-07-03 | Stop reason: HOSPADM

## 2024-06-25 RX ORDER — LEVOFLOXACIN 250 MG/1
750 TABLET, FILM COATED ORAL DAILY
Status: DISCONTINUED | OUTPATIENT
Start: 2024-06-25 | End: 2024-06-25

## 2024-06-25 RX ADMIN — INSULIN ASPART 2 UNITS: 100 INJECTION, SOLUTION INTRAVENOUS; SUBCUTANEOUS at 16:01

## 2024-06-25 RX ADMIN — MICONAZOLE NITRATE: 20 POWDER TOPICAL at 08:55

## 2024-06-25 RX ADMIN — VANCOMYCIN HYDROCHLORIDE 750 MG: 1 INJECTION, POWDER, LYOPHILIZED, FOR SOLUTION INTRAVENOUS at 10:28

## 2024-06-25 RX ADMIN — OXYCODONE HYDROCHLORIDE 5 MG: 5 TABLET ORAL at 19:54

## 2024-06-25 RX ADMIN — PIPERACILLIN AND TAZOBACTAM 3.38 G: 3; .375 INJECTION, POWDER, LYOPHILIZED, FOR SOLUTION INTRAVENOUS at 12:22

## 2024-06-25 RX ADMIN — PRAVASTATIN SODIUM 40 MG: 10 TABLET ORAL at 22:51

## 2024-06-25 RX ADMIN — METHOCARBAMOL 500 MG: 500 TABLET ORAL at 19:54

## 2024-06-25 RX ADMIN — PIPERACILLIN AND TAZOBACTAM 3.38 G: 3; .375 INJECTION, POWDER, LYOPHILIZED, FOR SOLUTION INTRAVENOUS at 05:27

## 2024-06-25 RX ADMIN — INSULIN ASPART 1 UNITS: 100 INJECTION, SOLUTION INTRAVENOUS; SUBCUTANEOUS at 22:51

## 2024-06-25 RX ADMIN — OXYCODONE HYDROCHLORIDE 5 MG: 5 TABLET ORAL at 10:34

## 2024-06-25 RX ADMIN — METOPROLOL SUCCINATE 25 MG: 25 TABLET, FILM COATED, EXTENDED RELEASE ORAL at 08:54

## 2024-06-25 RX ADMIN — INSULIN GLARGINE 10 UNITS: 100 INJECTION, SOLUTION SUBCUTANEOUS at 22:51

## 2024-06-25 RX ADMIN — Medication: at 08:55

## 2024-06-25 RX ADMIN — DOXYCYCLINE HYCLATE 100 MG: 100 CAPSULE ORAL at 19:54

## 2024-06-25 RX ADMIN — OXYCODONE HYDROCHLORIDE 5 MG: 5 TABLET ORAL at 15:26

## 2024-06-25 RX ADMIN — LEVOFLOXACIN 750 MG: 250 TABLET, FILM COATED ORAL at 15:26

## 2024-06-25 RX ADMIN — AMLODIPINE BESYLATE 5 MG: 5 TABLET ORAL at 08:55

## 2024-06-25 ASSESSMENT — ACTIVITIES OF DAILY LIVING (ADL)
ADLS_ACUITY_SCORE: 52
ADLS_ACUITY_SCORE: 52
ADLS_ACUITY_SCORE: 50
ADLS_ACUITY_SCORE: 52
ADLS_ACUITY_SCORE: 50
ADLS_ACUITY_SCORE: 52
ADLS_ACUITY_SCORE: 52
ADLS_ACUITY_SCORE: 50
ADLS_ACUITY_SCORE: 52
ADLS_ACUITY_SCORE: 50
ADLS_ACUITY_SCORE: 50
ADLS_ACUITY_SCORE: 52
ADLS_ACUITY_SCORE: 50
ADLS_ACUITY_SCORE: 52
ADLS_ACUITY_SCORE: 50
ADLS_ACUITY_SCORE: 50

## 2024-06-25 NOTE — PROGRESS NOTES
Brief Plastic Surgery Note  June 25, 2024    Ortho updated Plastic Surgery that they will be taking the patient to the OR tomorrow 7/26/24 for I&D and plastic surgery has been requested for skin management post op. Plastic surgery can manage the wound post op and suggests using vashe soaked guaze packing changed BID for dressing changes post op. Wound vac would not be suggested in this patient on coumadin with active infection. We will continue to follow and evaluate the wound post op.     Sussy Cole MD  Plastic Surgery PGY1

## 2024-06-25 NOTE — PROGRESS NOTES
To OR in AM for right shin wound I&D    INR goal < 1.5    Pharmacy to reverse INR      Joe Arshad MD.   Hospitalist.  [phone], pager.

## 2024-06-25 NOTE — PROGRESS NOTES
Brief orthopedic update:    Will plan for I&D of the RLE tomorrow, 6/26 with Dr. Salter. Will discuss plan with primary team and discuss reversal of INR, ideally to less than 1.5 prior to surgery. Will reach out to plastic surgery about wound management.     NPO midnight    Elder Holley MD  Orthopedic Surgery PGY4

## 2024-06-25 NOTE — PROGRESS NOTES
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  She was initially admitted to [facility] 6/14/2024 with right leg cellulitis.  MRI on 6/15 showed superficial soft tissue edema, repeat MRI on 6/21 discovered new fluid collection and possible anterior tibialis tenosynovitis despite being on IV ceftriaxone and IV vancomycin.  Wound culture 6/19 grew MRSA.  Transferred to [facility] on 6/22/24 for orthopedic surgery consultation.  Per Dr Batres, \" Dr. Mays from orthopaedics discussed pt's case with [facility] team and patient accepted to [facility] for medicine admission with orthopaedic surgery consultation\"    Right shin cellulitis w/ abscess  ---   Right lower extremity skin cancer extraction ~ 6/4/2024, path revealed hypertrophic lichen planus with squamous cell carcinoma with unclear margins.   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  ---   Wound culture 6/19 grew MRSA.  ---   ---92.9---104--->168 --->177.21  ---   WBC 17.0 ---> ---> ---> ---> ---> 15.2 ---> 12.9  ---   On IV Vancomycin since 6/13  ---   On IV Zosyn since 6/22  ---   S/p ceftriaxone added 6/18 - 6/22  ---   ID, plastic and orthopedics consult services following  ---   Per Ortho; possible septic tenosynovitis and also ant compartment myositis are likely due to the fluid collection from wound excision.  Ortho deferred surgical intervention to " plastic surgery  ---   Plastic surgery will continue to follow, however if erythema and pain do not improve, they recommend for Ortho to perform I&D shin as plastics will not manage lower extremity tendon involvement  ---   Patient's right shin erythema and pain continue to improve  ---   Continue IV vancomycin and IV Zosyn    Chronic atrial fibrillation  ---   PAF 55% in 9/2019  ---   in NSR during this eval  ---   Continue metoprolol for rate control  ---   [id number] score of at least 5   ---   Continue Coumadin for stroke prophylaxis  ---   INR 2.64 today    HTN  ---   Continue home Norvasc and metoprolol with holding parameters .     Acute renal insuffiency  on CKD3  ---   Baseline Cr ~ 1.2 - 1.5  ---   Admit Cr was 1.62 ---> 1.26 ---> ---> ---> 1.79 ---> 1.57 today  ---   Avoid nephrotoxins o/t IV abx     T2DM  ---  Hgba1 was 7.0% on 6/14/24  ---   Glucose under reasonable control values around 150.  ---   Glipizide held since admission.  ---   Continue home Lantus 10 units at bedtime  ---   Continue Novolog sliding scale insulin      HLD  ---   Continue home Pravachol.     Mild cognitive impairment  ---   Family notes that patient has been a bit more confused than her usual baseline since admission.  ---   May be related to oxycodone.  ---   OT consulted for slums assessment.            Diet: Snacks/Supplements Adult: Glucerna; With Meals  Regular Diet Adult    DVT Prophylaxis: Warfarin  Villalobos Catheter: PRESENT, indication: Acute retention or obstruction  Lines: None     Cardiac Monitoring: None  Code Status: No CPR- Do NOT Intubate    Disposition Plan    Per Ortho rec              Joe Arshad MD  Hospitalist Service, [address]  Securely message with [employer] (more info)  Text page via [employer] Paging/Directory   See signed in provider for up to date coverage  information  ______________________________________________________________________    Interval History   Pt has no complaints  Uneventful night  She feels her right shin erythema and cellulitis continue to improve  She prefers to avoid surgery if possible  She thinks current antibiotic is working      Physical Exam   Vital Signs: Temp: 98.8  F (37.1  C) Temp src: Oral BP: 137/58 Pulse: 67   Resp: 16 SpO2: 92 % O2 Device: None (Room air) Oxygen Delivery: 1 LPM  Weight: 0 lbs 0 oz  General appearence: awake alert  in  no apparent distress  RESPIRATORY: lungs clear    CARDIOVASCULAR:S1 S2 regular rate and rhythm, no rubs gallops or murmurs appreciated  GASTROINTESTINAL:soft, non-distended , non-tender , + bowel sounds,   SKIN: warm and dry, no mottling noted   NEUROLOGIC; awake alert and oriented, no focal deficits found  EXTREMITIES: no clubbing, cyanosis or edema ,  able to wigged toes in right, no calf tenderness , calf soft , see images of wound      6/23:            Data     I have personally reviewed the following data over the past 24 hrs:    12.9 (H)  \   10.0 (L)   / 324     141 105 31.0 (H) /  109 (H)   4.1 24 1.57 (H) \     INR:  2.64 (H) PTT:  N/A   D-dimer:  N/A Fibrinogen:  N/A       Imaging results reviewed over the past 24 hrs:   No results found for this or any previous visit (from the past 24 hour(s)).

## 2024-06-25 NOTE — PROGRESS NOTES
On 1LPM O2 O/N, weaned to RA this AM. Denies pain. RLE cellulitis, drsg CDI. Tolerating intake, no appetite. Villalobos in place, patent with AUOP. PIV infusing TKO, on ABX. Pt slept between cares.

## 2024-06-25 NOTE — PROGRESS NOTES
[facility] ID Service: Follow Up Note      Patient:  Francy Sherman   Date of birth 1940, Medical record number [id number]  Date of Visit:  06/25/2024  Date of Admission: 6/22/2024         Assessment and Recommendations:   ID Problem List:  RLE cellulitis, MRSA on culture  Non-healing anterior shin wound s/p recent biopsies  MRI w/ fluid collection 1.2 x1x2.2cm, which appeared to be communicating with anterior tibialis, c/f possible septic tenosynovitis, likely anterior compartment myositis of right leg  Recent excisional skin bx RLE with hypertrophic lichen planus, squamous cell carcinoma with unclear margins (6/10/24)  Previous bx 6/4 resulted benign (possibly not deep enough)  C/f 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    Culture with MRSA, was started on vancomycin on 6/14. Has had ongoing purulence and erythema at site with elevated inflammatory markers. Ceftriaxone was added 6/19. MRI 6/21 with fluid collection 1.2 x1x2.2cm, which appeared to be communicating with anterior tibialis, c/f possible septic tenosynovitis, likely anterior compartment myositis of right leg. Plastic surgery and orthopedics consulted, no plan for surgery at this time unless worsening exam findings.     Antibiotics expanded to vancomycin + Zosyn with improvement x48-72 hours. Ideally, will avoid IV abx if able due to risk of side effects and adverse  events. Recommend transition to PO doxycycline (MRSA) and levofloxacin (gram negatives, Pseudomonas, some anaerobes) and monitor wound.       Recs were discussed with primary team today. Don't hesitate to call with questions.     Attestation:  I have reviewed today's vital signs, medications, labs and imaging.    Patricia Murray PA-C  Pronouns: she/her/hers  Infectious Diseases  Contact via [employer] or [employer] Paging/Directory       50 MINUTES SPENT BY ME on the date of service doing chart review, history, exam, documentation & further activities per the note.             Interval History:       Afebrile, VSS. CR 1.54 -->1.79-->1.57. WBC down-trending    Overnight updates: Concern for urinary obstruction leading to LIZZ.  Feeling well today with less redness and swelling. No new fevers, chills, nausea, vomiting, diarrhea, rashes.          Current Antimicrobials   Current:  - vancomycin 6/14-present  - Zosyn 6/23 - present    Prior:  - ceftriaxone 6/18-6/23  - cefepime 6/14         Physical Exam:   Ranges for vital signs:  Temp:  [98.7  F (37.1  C)-98.8  F (37.1  C)] 98.8  F (37.1  C)  Pulse:  [67-71] 67  Resp:  [16] 16  BP: (118-137)/(44-58) 137/58  SpO2:  [92 %-100 %] 92 %    Intake/Output Summary (Last 24 hours) at 6/24/2024 1531  Last data filed at 6/23/2024 2243  Gross per 24 hour   Intake --   Output 400 ml   Net -400 ml     Exam:  GENERAL:  well-developed, well-nourished, supine in bed in no acute distress.   LUNGS:  Normal respiratory effort on RA.  ABDOMEN:  Normal bowel sounds, soft, nontender.  EXT: Extremities warm. No LE edema. DP pulses intact bilaterally.  SKIN:  No acute rashes, jaundice, or diaphoresis. Improvement in redness and swelling over distal RLE. Mepilex over wound.  NEUROLOGIC:  Active x4 extremities           Laboratory Data:   Reviewed.  Pertinent for:    Culture data:  Culture   Date Value Ref Range Status   06/19/2024 3+ Staphylococcus aureus MRSA (A)  Final   06/14/2024 No Growth  Final        Inflammatory Markers    Recent Labs   Lab Test 06/24/24  0733 06/22/24  0857 06/21/24  0537 06/19/24  0707 06/15/24  1158   SED  --  88* 82* 66* 58*   CRPI 177.21* 168.00* 104.00* 92.90* 146.00*       Hematology Studies    Recent Labs   Lab Test 06/25/24  0600 06/24/24  0733 06/23/24  0708 06/22/24  0857 06/17/24  0746 06/15/24  0631 06/14/24  0744   WBC 12.9* 15.2* 16.6*  --  10.3 13.9* 17.0*   HGB 10.0*  --   --  10.8* 10.8* 11.4* 12.0   MCV 95  --   --   --  95 94 96     --   --   --  201 162 141*     No lab results found.    Metabolic Studies     Recent Labs   Lab Test 06/25/24  0600 06/24/24  0733 06/23/24  0708 06/22/24  0705 06/17/24  0746 06/16/24  0856 06/15/24  0631    137  --   --   --  138 138   POTASSIUM 4.1 5.2  --   --   --  4.5 5.0   CHLORIDE 105 100  --   --   --  105 105   CO2 24 23  --   --   --  21* 23   BUN 31.0* 38.7*  --   --   --  33.7* 32.4*   CR 1.57* 1.79*  1.79* 1.54* 1.35*   < > 1.26* 1.26*   GFRESTIMATED 32* 28*  28* 33* 39*   < > 42* 42*    < > = values in this interval not displayed.       Hepatic Studies    Recent Labs   Lab Test 06/16/24  0856 06/15/24  0631 06/14/24  0744   BILITOTAL 0.5 0.4 0.7   ALKPHOS 115 120 89   ALBUMIN 3.2* 3.4* 3.7   AST 50* 99* 59*   ALT 97* 143* 72*            Imaging:     MRI Tibia Fibula RLE w/o & w/ contrast 6/21/24  IMPRESSION:  1.  Open wound redemonstrated anterolateral to the midshaft right tibia with adjacent cellulitis.  2.  Defined subcutaneous fluid collection deep to the wound in the anterior mid right leg soft tissues along the superficial muscle belly of the anterior tibialis, approximately 1.2 x 1.0 x 2.2 cm.  3.  The collection appears to communicate with the anterior tibialis greater than extensor digitorum longus tendon sheaths, concerning for septic tenosynovitis.  4.  Likely anterior compartment myositis right leg near the wound.  5.  No evidence for right tibia or fibula acute osteomyelitis.

## 2024-06-25 NOTE — PLAN OF CARE
Goal Outcome Evaluation:      Plan of Care Reviewed With: patient    Overall Patient Progress: improvingOverall Patient Progress: improving    Outcome Evaluation: BM last evening. Dsg changed per POC. Ortho and plastics are determining if surgery needed. Cont IV abx

## 2024-06-25 NOTE — PLAN OF CARE
Goal Outcome Evaluation:      Plan of Care Reviewed With: patient    Overall Patient Progress: improvingOverall Patient Progress: improving    Outcome Evaluation: pt reporting 10/10 abdominal pain at start of shift. schilling catheter output slowed and pt reporting not having had BM in about 1week. provider notified, schilling catheter irrigated and pt reported relief from acute abd pain, but still uncomfortable due to constipation. enema given and pt had large BM. A&O x4, has call light in reach and is able to make needs known. up with assist of 2, walker GB. RLE wound red/franklin in color but marked border has receded. 5/10 pain managed with prn tylenol, robaxin, 5mg oxycodone. BGchecks. continue plan of care      VS: Temp: 98.7  F (37.1  C) Temp src: Oral BP: 118/44 Pulse: 71   Resp: 16 SpO2: 100 % O2 Device: Nasal cannula Oxygen Delivery: 1 LPM     O2: SpO2>90% on 1L NC, denies SOB and CP{   Output: Schilling catheter in place and draining adequately. Catheter cares completed   Last BM: 06/24/2024   Activity: Up with assist of 2, walker, GB   Skin: Redness in groin and abdominal folds, scattered bruising, RLE cellulitis   Pain: 5/10 RLE pain managed with PRN tylenol, robaxin, and 5mg oxycodone   CMS: A&O x4, denies N/T   Dressing: RLE dressing CDI   Diet: Moderate carb, denies nausea   LDA: R  PIV SL   Equipment: IV pole, walker, commode, personal belongings   Plan: TBD   Additional Info:

## 2024-06-25 NOTE — PROGRESS NOTES
Brief orthopedic update:    Awaiting to hear back from our orthopedic oncology colleagues on best steps for treatment. For now recommend continued antibiotics, no plans for operative intervention at this time.    Discussed with Dr. Davon Holley MD  Orthopedic Surgery PGY4

## 2024-06-25 NOTE — PROGRESS NOTES
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  Patient has climbing WBC and worsening rubor on exam today, will discuss benefit of I&D with ortho. INR today is 2.64.    -Plastic surgery attending recommends continue medihoney wound care for superficial dermatologic defect, however deeper infectious process involving muscle and tendon sheath can be managed by ortho   -this recommendation has been relayed to ortho  superficial wound can be seen in outpatient wound clinic at [facility] after deeper infection has been addressed, primary team can place a referral for [facility] wound clinic at discharge  -Wound care to R shin daily  1. Soak wound with vashe moistened gauze for 5 minutes, pat dry  2. Apply nickel thick layer of medihoney gel to wound  3. Cover with mepilex  4. Change dressing daily + PRN if soiled, saturated, falls off  -plastic surgery will sign off at this time    Sussy Cole MD- PGY1  Plastic Surgery Resident  Pager [phone]  ------------------------------------------------------------    S/24h: No acute events overnight. Patient reports pain along the dorsum of the foot.    O: /44 (BP Location: Right arm)   Pulse 71   Temp 98.7  F (37.1  C) (Oral)   Resp 16   SpO2 98%    General: NAD, following commands  HEENT: pupils equal and reactive  CV: RRR  Pulm: Non labored breathing  RLE ttp over mid shin, macerated wound 2cm long x 1 cm wide. purulent fluid expressed from lateral edge of wound, shin and foot are swollen however no appreciable fluctuance, no palpable superficial abscess. Worsening rubor  distal to the wound with moderate induration, tenderness and pain elicited over the dorsum of the foot and distal anterior shin with passive dorsiflexion      BMP  Recent Labs   Lab 06/25/24  0600 06/25/24  0253 06/24/24  2208 06/24/24  1437 06/24/24  0852 06/24/24  0733 06/23/24  0726 06/23/24  0708 06/22/24  0835 06/22/24  0705     --   --   --   --  137  --   --   --   --    POTASSIUM 4.1  --   --   --   --  5.2  --   --   --   --    CHLORIDE 105  --   --   --   --  100  --   --   --   --    CO2 24  --   --   --   --  23  --   --   --   --    BUN 31.0*  --   --   --   --  38.7*  --   --   --   --    CR 1.57*  --   --   --   --  1.79*  1.79*  --  1.54*  --  1.35*   * 136* 175* 194*   < > 108*   < >  --    < >  --     < > = values in this interval not displayed.     CBC  Recent Labs   Lab 06/25/24  0600 06/24/24  0733 06/23/24  0708 06/22/24  0857   WBC 12.9* 15.2* 16.6*  --    HGB 10.0*  --   --  10.8*     --   --   --

## 2024-06-25 NOTE — PHARMACY-VANCOMYCIN DOSING SERVICE
"Pharmacy Vancomycin Note  Date of Service 2024  Patient's  1940   84 year old, female    Indication: Abscess and Skin and Soft Tissue Infection  Day of Therapy: Started   Current vancomycin regimen:  750 mg IV q24h  Current vancomycin monitoring method: AUC  Current vancomycin therapeutic monitoring goal: 400-600 mg*h/L    [employer] Prediction of Current Vancomycin Regimen  Loading dose: N/A  Regimen: 750 mg IV every 24 hours.  Start time: 12:41 on 2024  Exposure target: AUC24 (range)400-600 mg/L.hr   AUC24,ss: 473 mg/L.hr  Probability of AUC24 > 400: 96 %  Ctrough,ss: 15.6 mg/L  Probability of Ctrough,ss > 20: 2 %  Probability of nephrotoxicity (Lodise DELMER ): 11 %      Current estimated CrCl = Estimated Creatinine Clearance: 27.2 mL/min (A) (based on SCr of 1.57 mg/dL (H)).    Creatinine for last 3 days  2024:  7:08 AM Creatinine 1.54 mg/dL  2024:  7:33 AM Creatinine 1.79 mg/dL;  7:33 AM Creatinine 1.79 mg/dL  2024:  6:00 AM Creatinine 1.57 mg/dL    Recent Vancomycin Levels (past 3 days)  2024:  7:08 AM Vancomycin 20.6 ug/mL  2024:  6:00 AM Vancomycin 17.8 ug/mL    Vancomycin IV Administrations (past 72 hours)                     vancomycin (VANCOCIN) 750 mg in sodium chloride 0.9 % 250 mL intermittent infusion (mg) 750 mg New Bag 24 1241     750 mg New Bag 24 1037    vancomycin (VANCOCIN) 1,000 mg in NaCl 0.9% 200 mL intermittent infusion (mg) 1,000 mg Given 24 1032                    Nephrotoxins and other renal medications (From now, onward)      Start     Dose/Rate Route Frequency Ordered Stop    24 1430  piperacillin-tazobactam (ZOSYN) 3.375 g vial to attach to  mL bag        Note to Pharmacy: For [facility], [facility] and [facility]: For Zosyn-naive patients, use the \"Zosyn initial dose + extended infusion\" order panel.    3.375 g  over 30 Minutes Intravenous EVERY 6 HOURS 24 1421      24 1000  vancomycin (VANCOCIN) 750 mg in sodium " chloride 0.9 % 250 mL intermittent infusion         750 mg  over 90 Minutes Intravenous EVERY 24 HOURS 06/23/24 0800                 Contrast Orders - past 72 hours (72h ago, onward)      None            Interpretation of levels and current regimen:  Vancomycin level is reflective of -600    Has serum creatinine changed greater than 50% in last 72 hours: No    Urine output:  unable to determine    Renal Function: Improving      Plan:  Continue Current Dose  Vancomycin monitoring method: AUC  Vancomycin therapeutic monitoring goal: 400-600 mg*h/L  Pharmacy will check vancomycin levels as appropriate in 1-3 Days pending renal function changes.  Serum creatinine levels will be ordered daily for the first week of therapy and at least twice weekly for subsequent weeks.    Sue Reed, [facility]

## 2024-06-25 NOTE — PROGRESS NOTES
Francy talked to me before I left my shift that a blonde provider, whose name she can't remember, was in the room a bit ago and Francy said she spoke to quickly about not wanting surgery and wants the provider to know she is reconsidering. NPO after MN order already in place  from earlier today.

## 2024-06-25 NOTE — PROGRESS NOTES
VS: /58 (BP Location: Left arm)   Pulse 67   Temp 98.8  F (37.1  C) (Oral)   Resp 16   SpO2 92%         O2: SpO2>90% on RA, denies SOB and CP{   Output: Villalobos catheter in place and draining adequately. Catheter cares completed   Last BM: 06/24/2024   Activity: Up with assist of 2, walker, GB   Skin: Redness in groin and abdominal folds, medication provided, scattered bruising, RLE cellulitis   Pain:  RLE pain managed with PRN tylenol and 5mg oxycodone   CMS: A&O x4, denies N/T   Dressing: RLE dressing CDI, changed per POC   Diet: Moderate carb, denies nausea, not eating very much   LDA: R PIV SL   Equipment: IV pole, walker, commode, personal belongings   Plan: TBD   Additional Info:

## 2024-06-26 ENCOUNTER — ANESTHESIA (OUTPATIENT)
Dept: SURGERY | Facility: CLINIC | Age: 84
DRG: 623 | End: 2024-06-26
Payer: COMMERCIAL

## 2024-06-26 ENCOUNTER — ANESTHESIA EVENT (OUTPATIENT)
Dept: SURGERY | Facility: CLINIC | Age: 84
DRG: 623 | End: 2024-06-26
Payer: COMMERCIAL

## 2024-06-26 LAB
ANION GAP SERPL CALCULATED.3IONS-SCNC: 15 MMOL/L (ref 7–15)
BACTERIA SPEC CULT: ABNORMAL
BACTERIA SPEC CULT: ABNORMAL
BASOPHILS # BLD AUTO: 0.1 10E3/UL (ref 0–0.2)
BASOPHILS NFR BLD AUTO: 1 %
BUN SERPL-MCNC: 25.1 MG/DL (ref 8–23)
CALCIUM SERPL-MCNC: 9.9 MG/DL (ref 8.8–10.2)
CHLORIDE SERPL-SCNC: 102 MMOL/L (ref 98–107)
CREAT SERPL-MCNC: 1.46 MG/DL (ref 0.51–0.95)
CRP SERPL-MCNC: 65.27 MG/L
DEPRECATED HCO3 PLAS-SCNC: 26 MMOL/L (ref 22–29)
EGFRCR SERPLBLD CKD-EPI 2021: 35 ML/MIN/1.73M2
EOSINOPHIL # BLD AUTO: 0.2 10E3/UL (ref 0–0.7)
EOSINOPHIL NFR BLD AUTO: 1 %
ERYTHROCYTE [DISTWIDTH] IN BLOOD BY AUTOMATED COUNT: 11.9 % (ref 10–15)
GLUCOSE BLDC GLUCOMTR-MCNC: 118 MG/DL (ref 70–99)
GLUCOSE BLDC GLUCOMTR-MCNC: 132 MG/DL (ref 70–99)
GLUCOSE BLDC GLUCOMTR-MCNC: 136 MG/DL (ref 70–99)
GLUCOSE BLDC GLUCOMTR-MCNC: 139 MG/DL (ref 70–99)
GLUCOSE BLDC GLUCOMTR-MCNC: 160 MG/DL (ref 70–99)
GLUCOSE SERPL-MCNC: 147 MG/DL (ref 70–99)
GRAM STAIN RESULT: ABNORMAL
HCT VFR BLD AUTO: 33.2 % (ref 35–47)
HGB BLD-MCNC: 10.6 G/DL (ref 11.7–15.7)
IMM GRANULOCYTES # BLD: 0.1 10E3/UL
IMM GRANULOCYTES NFR BLD: 1 %
INR PPP: 2.2 (ref 0.85–1.15)
LYMPHOCYTES # BLD AUTO: 1.2 10E3/UL (ref 0.8–5.3)
LYMPHOCYTES NFR BLD AUTO: 11 %
MCH RBC QN AUTO: 29.9 PG (ref 26.5–33)
MCHC RBC AUTO-ENTMCNC: 31.9 G/DL (ref 31.5–36.5)
MCV RBC AUTO: 94 FL (ref 78–100)
MONOCYTES # BLD AUTO: 1.2 10E3/UL (ref 0–1.3)
MONOCYTES NFR BLD AUTO: 11 %
NEUTROPHILS # BLD AUTO: 8.9 10E3/UL (ref 1.6–8.3)
NEUTROPHILS NFR BLD AUTO: 75 %
NRBC # BLD AUTO: 0 10E3/UL
NRBC BLD AUTO-RTO: 0 /100
PLATELET # BLD AUTO: 361 10E3/UL (ref 150–450)
POTASSIUM SERPL-SCNC: 5.1 MMOL/L (ref 3.4–5.3)
RBC # BLD AUTO: 3.55 10E6/UL (ref 3.8–5.2)
SODIUM SERPL-SCNC: 143 MMOL/L (ref 135–145)
WBC # BLD AUTO: 11.7 10E3/UL (ref 4–11)

## 2024-06-26 PROCEDURE — 99232 SBSQ HOSP IP/OBS MODERATE 35: CPT | Performed by: PHYSICIAN ASSISTANT

## 2024-06-26 PROCEDURE — 99100 ANES PT EXTEME AGE<1 YR&>70: CPT | Performed by: ANESTHESIOLOGY

## 2024-06-26 PROCEDURE — 36415 COLL VENOUS BLD VENIPUNCTURE: CPT | Performed by: INTERNAL MEDICINE

## 2024-06-26 PROCEDURE — 250N000013 HC RX MED GY IP 250 OP 250 PS 637: Performed by: FAMILY MEDICINE

## 2024-06-26 PROCEDURE — 250N000025 HC SEVOFLURANE, PER MIN: Performed by: ORTHOPAEDIC SURGERY

## 2024-06-26 PROCEDURE — 87075 CULTR BACTERIA EXCEPT BLOOD: CPT | Performed by: ORTHOPAEDIC SURGERY

## 2024-06-26 PROCEDURE — 250N000011 HC RX IP 250 OP 636: Performed by: FAMILY MEDICINE

## 2024-06-26 PROCEDURE — 87186 SC STD MICRODIL/AGAR DIL: CPT | Performed by: ORTHOPAEDIC SURGERY

## 2024-06-26 PROCEDURE — 88342 IMHCHEM/IMCYTCHM 1ST ANTB: CPT | Mod: 26 | Performed by: DERMATOLOGY

## 2024-06-26 PROCEDURE — 88312 SPECIAL STAINS GROUP 1: CPT | Mod: 26 | Performed by: DERMATOLOGY

## 2024-06-26 PROCEDURE — 250N000013 HC RX MED GY IP 250 OP 250 PS 637: Performed by: INTERNAL MEDICINE

## 2024-06-26 PROCEDURE — 88312 SPECIAL STAINS GROUP 1: CPT | Mod: TC | Performed by: ORTHOPAEDIC SURGERY

## 2024-06-26 PROCEDURE — 80048 BASIC METABOLIC PNL TOTAL CA: CPT | Performed by: INTERNAL MEDICINE

## 2024-06-26 PROCEDURE — 88305 TISSUE EXAM BY PATHOLOGIST: CPT | Mod: 26 | Performed by: DERMATOLOGY

## 2024-06-26 PROCEDURE — 272N000001 HC OR GENERAL SUPPLY STERILE: Performed by: ORTHOPAEDIC SURGERY

## 2024-06-26 PROCEDURE — 86140 C-REACTIVE PROTEIN: CPT | Performed by: INTERNAL MEDICINE

## 2024-06-26 PROCEDURE — 99232 SBSQ HOSP IP/OBS MODERATE 35: CPT | Performed by: INTERNAL MEDICINE

## 2024-06-26 PROCEDURE — 27603 DRAIN LOWER LEG LESION: CPT | Mod: RT | Performed by: ORTHOPAEDIC SURGERY

## 2024-06-26 PROCEDURE — 370N000017 HC ANESTHESIA TECHNICAL FEE, PER MIN: Performed by: ORTHOPAEDIC SURGERY

## 2024-06-26 PROCEDURE — 250N000009 HC RX 250

## 2024-06-26 PROCEDURE — 99100 ANES PT EXTEME AGE<1 YR&>70: CPT

## 2024-06-26 PROCEDURE — 360N000075 HC SURGERY LEVEL 2, PER MIN: Performed by: ORTHOPAEDIC SURGERY

## 2024-06-26 PROCEDURE — 258N000001 HC RX 258: Performed by: ORTHOPAEDIC SURGERY

## 2024-06-26 PROCEDURE — 258N000003 HC RX IP 258 OP 636

## 2024-06-26 PROCEDURE — 85610 PROTHROMBIN TIME: CPT | Performed by: FAMILY MEDICINE

## 2024-06-26 PROCEDURE — 999N000141 HC STATISTIC PRE-PROCEDURE NURSING ASSESSMENT: Performed by: ORTHOPAEDIC SURGERY

## 2024-06-26 PROCEDURE — 85025 COMPLETE CBC W/AUTO DIFF WBC: CPT | Performed by: INTERNAL MEDICINE

## 2024-06-26 PROCEDURE — 27603 DRAIN LOWER LEG LESION: CPT | Performed by: ANESTHESIOLOGY

## 2024-06-26 PROCEDURE — 120N000002 HC R&B MED SURG/OB UMMC

## 2024-06-26 PROCEDURE — 87205 SMEAR GRAM STAIN: CPT | Performed by: ORTHOPAEDIC SURGERY

## 2024-06-26 PROCEDURE — 250N000011 HC RX IP 250 OP 636: Mod: JZ

## 2024-06-26 PROCEDURE — 0KBS0ZZ EXCISION OF RIGHT LOWER LEG MUSCLE, OPEN APPROACH: ICD-10-PCS | Performed by: ORTHOPAEDIC SURGERY

## 2024-06-26 PROCEDURE — 710N000010 HC RECOVERY PHASE 1, LEVEL 2, PER MIN: Performed by: ORTHOPAEDIC SURGERY

## 2024-06-26 PROCEDURE — 87102 FUNGUS ISOLATION CULTURE: CPT | Performed by: ORTHOPAEDIC SURGERY

## 2024-06-26 PROCEDURE — 27603 DRAIN LOWER LEG LESION: CPT

## 2024-06-26 RX ORDER — LIDOCAINE HYDROCHLORIDE 20 MG/ML
INJECTION, SOLUTION INFILTRATION; PERINEURAL PRN
Status: DISCONTINUED | OUTPATIENT
Start: 2024-06-26 | End: 2024-06-26

## 2024-06-26 RX ORDER — SODIUM CHLORIDE, SODIUM LACTATE, POTASSIUM CHLORIDE, CALCIUM CHLORIDE 600; 310; 30; 20 MG/100ML; MG/100ML; MG/100ML; MG/100ML
INJECTION, SOLUTION INTRAVENOUS CONTINUOUS
Status: DISCONTINUED | OUTPATIENT
Start: 2024-06-26 | End: 2024-06-26 | Stop reason: HOSPADM

## 2024-06-26 RX ORDER — HYDROMORPHONE HYDROCHLORIDE 4 MG/ML
INJECTION, SOLUTION INTRAMUSCULAR; INTRAVENOUS; SUBCUTANEOUS PRN
Status: DISCONTINUED | OUTPATIENT
Start: 2024-06-26 | End: 2024-06-26

## 2024-06-26 RX ORDER — FENTANYL CITRATE 50 UG/ML
25 INJECTION, SOLUTION INTRAMUSCULAR; INTRAVENOUS EVERY 5 MIN PRN
Status: DISCONTINUED | OUTPATIENT
Start: 2024-06-26 | End: 2024-06-26 | Stop reason: HOSPADM

## 2024-06-26 RX ORDER — SODIUM CHLORIDE, SODIUM LACTATE, POTASSIUM CHLORIDE, CALCIUM CHLORIDE 600; 310; 30; 20 MG/100ML; MG/100ML; MG/100ML; MG/100ML
INJECTION, SOLUTION INTRAVENOUS CONTINUOUS PRN
Status: DISCONTINUED | OUTPATIENT
Start: 2024-06-26 | End: 2024-06-26

## 2024-06-26 RX ORDER — CEFAZOLIN SODIUM/WATER 2 G/20 ML
SYRINGE (ML) INTRAVENOUS PRN
Status: DISCONTINUED | OUTPATIENT
Start: 2024-06-26 | End: 2024-06-26

## 2024-06-26 RX ORDER — FENTANYL CITRATE 50 UG/ML
INJECTION, SOLUTION INTRAMUSCULAR; INTRAVENOUS PRN
Status: DISCONTINUED | OUTPATIENT
Start: 2024-06-26 | End: 2024-06-26

## 2024-06-26 RX ORDER — ONDANSETRON 2 MG/ML
4 INJECTION INTRAMUSCULAR; INTRAVENOUS EVERY 30 MIN PRN
Status: DISCONTINUED | OUTPATIENT
Start: 2024-06-26 | End: 2024-06-26 | Stop reason: HOSPADM

## 2024-06-26 RX ORDER — KETOROLAC TROMETHAMINE 30 MG/ML
30 INJECTION, SOLUTION INTRAMUSCULAR; INTRAVENOUS EVERY 12 HOURS
Status: DISCONTINUED | OUTPATIENT
Start: 2024-06-26 | End: 2024-06-26

## 2024-06-26 RX ORDER — PROPOFOL 10 MG/ML
INJECTION, EMULSION INTRAVENOUS PRN
Status: DISCONTINUED | OUTPATIENT
Start: 2024-06-26 | End: 2024-06-26

## 2024-06-26 RX ORDER — ONDANSETRON 2 MG/ML
INJECTION INTRAMUSCULAR; INTRAVENOUS PRN
Status: DISCONTINUED | OUTPATIENT
Start: 2024-06-26 | End: 2024-06-26

## 2024-06-26 RX ORDER — FENTANYL CITRATE 50 UG/ML
50 INJECTION, SOLUTION INTRAMUSCULAR; INTRAVENOUS EVERY 5 MIN PRN
Status: DISCONTINUED | OUTPATIENT
Start: 2024-06-26 | End: 2024-06-26 | Stop reason: HOSPADM

## 2024-06-26 RX ORDER — DEXAMETHASONE SODIUM PHOSPHATE 4 MG/ML
4 INJECTION, SOLUTION INTRA-ARTICULAR; INTRALESIONAL; INTRAMUSCULAR; INTRAVENOUS; SOFT TISSUE
Status: DISCONTINUED | OUTPATIENT
Start: 2024-06-26 | End: 2024-06-26 | Stop reason: HOSPADM

## 2024-06-26 RX ORDER — ONDANSETRON 4 MG/1
4 TABLET, ORALLY DISINTEGRATING ORAL EVERY 30 MIN PRN
Status: DISCONTINUED | OUTPATIENT
Start: 2024-06-26 | End: 2024-06-26 | Stop reason: HOSPADM

## 2024-06-26 RX ORDER — NALOXONE HYDROCHLORIDE 0.4 MG/ML
0.1 INJECTION, SOLUTION INTRAMUSCULAR; INTRAVENOUS; SUBCUTANEOUS
Status: DISCONTINUED | OUTPATIENT
Start: 2024-06-26 | End: 2024-06-26 | Stop reason: HOSPADM

## 2024-06-26 RX ADMIN — FENTANYL CITRATE 25 MCG: 50 INJECTION INTRAMUSCULAR; INTRAVENOUS at 08:07

## 2024-06-26 RX ADMIN — MICONAZOLE NITRATE: 20 POWDER TOPICAL at 21:03

## 2024-06-26 RX ADMIN — INSULIN GLARGINE 10 UNITS: 100 INJECTION, SOLUTION SUBCUTANEOUS at 22:15

## 2024-06-26 RX ADMIN — PROPOFOL 60 MG: 10 INJECTION, EMULSION INTRAVENOUS at 07:39

## 2024-06-26 RX ADMIN — MICONAZOLE NITRATE: 20 POWDER TOPICAL at 10:52

## 2024-06-26 RX ADMIN — LIDOCAINE HYDROCHLORIDE 60 MG: 20 INJECTION, SOLUTION INFILTRATION; PERINEURAL at 07:38

## 2024-06-26 RX ADMIN — FENTANYL CITRATE 25 MCG: 50 INJECTION INTRAMUSCULAR; INTRAVENOUS at 08:11

## 2024-06-26 RX ADMIN — FENTANYL CITRATE 25 MCG: 50 INJECTION INTRAMUSCULAR; INTRAVENOUS at 07:53

## 2024-06-26 RX ADMIN — OXYCODONE HYDROCHLORIDE 5 MG: 5 TABLET ORAL at 21:00

## 2024-06-26 RX ADMIN — HYDROMORPHONE HYDROCHLORIDE 0.2 MG: 4 INJECTION, SOLUTION INTRAMUSCULAR; INTRAVENOUS; SUBCUTANEOUS at 08:15

## 2024-06-26 RX ADMIN — ONDANSETRON 4 MG: 4 TABLET, ORALLY DISINTEGRATING ORAL at 00:44

## 2024-06-26 RX ADMIN — INSULIN ASPART 1 UNITS: 100 INJECTION, SOLUTION INTRAVENOUS; SUBCUTANEOUS at 22:15

## 2024-06-26 RX ADMIN — AMLODIPINE BESYLATE 5 MG: 5 TABLET ORAL at 10:55

## 2024-06-26 RX ADMIN — SODIUM CHLORIDE, POTASSIUM CHLORIDE, SODIUM LACTATE AND CALCIUM CHLORIDE: 600; 310; 30; 20 INJECTION, SOLUTION INTRAVENOUS at 07:32

## 2024-06-26 RX ADMIN — DOXYCYCLINE HYCLATE 100 MG: 100 CAPSULE ORAL at 10:48

## 2024-06-26 RX ADMIN — FENTANYL CITRATE 25 MCG: 50 INJECTION INTRAMUSCULAR; INTRAVENOUS at 07:38

## 2024-06-26 RX ADMIN — PROPOFOL 60 MG: 10 INJECTION, EMULSION INTRAVENOUS at 07:38

## 2024-06-26 RX ADMIN — Medication 1 LOZENGE: at 22:15

## 2024-06-26 RX ADMIN — METOPROLOL SUCCINATE 25 MG: 25 TABLET, FILM COATED, EXTENDED RELEASE ORAL at 10:49

## 2024-06-26 RX ADMIN — DOXYCYCLINE HYCLATE 100 MG: 100 CAPSULE ORAL at 21:01

## 2024-06-26 RX ADMIN — ACETAMINOPHEN 650 MG: 325 TABLET, FILM COATED ORAL at 21:00

## 2024-06-26 RX ADMIN — METHOCARBAMOL 500 MG: 500 TABLET ORAL at 22:22

## 2024-06-26 RX ADMIN — ONDANSETRON 4 MG: 2 INJECTION INTRAMUSCULAR; INTRAVENOUS at 08:27

## 2024-06-26 RX ADMIN — Medication 1 LOZENGE: at 16:51

## 2024-06-26 RX ADMIN — PRAVASTATIN SODIUM 40 MG: 10 TABLET ORAL at 22:15

## 2024-06-26 RX ADMIN — Medication 2 G: at 07:50

## 2024-06-26 ASSESSMENT — ACTIVITIES OF DAILY LIVING (ADL)
ADLS_ACUITY_SCORE: 45
ADLS_ACUITY_SCORE: 50
ADLS_ACUITY_SCORE: 50
ADLS_ACUITY_SCORE: 45
ADLS_ACUITY_SCORE: 50
ADLS_ACUITY_SCORE: 50
ADLS_ACUITY_SCORE: 45
ADLS_ACUITY_SCORE: 50
ADLS_ACUITY_SCORE: 45
ADLS_ACUITY_SCORE: 50
ADLS_ACUITY_SCORE: 45
ADLS_ACUITY_SCORE: 50
ADLS_ACUITY_SCORE: 45
ADLS_ACUITY_SCORE: 45
ADLS_ACUITY_SCORE: 50
ADLS_ACUITY_SCORE: 50
ADLS_ACUITY_SCORE: 45

## 2024-06-26 ASSESSMENT — ENCOUNTER SYMPTOMS: DYSRHYTHMIAS: 1

## 2024-06-26 NOTE — PROGRESS NOTES
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  She was initially admitted to [facility] 6/14/2024 with right leg cellulitis.  MRI on 6/15 showed superficial soft tissue edema, repeat MRI on 6/21 discovered new fluid collection and possible anterior tibialis tenosynovitis despite being on IV ceftriaxone and IV vancomycin.  Wound culture 6/19 grew MRSA.  Transferred to [facility] on 6/22/24 for orthopedic surgery consultation.  Per Dr Batres, \" Dr. Mays from orthopaedics discussed pt's case with [facility] team and patient accepted to [facility] for medicine admission with orthopaedic surgery consultation\"    Right shin cellulitis w/ abscess  ---   Right lower extremity skin cancer extraction ~ 6/4/2024, path revealed hypertrophic lichen planus with squamous cell carcinoma with unclear margins.   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  ---   Wound culture 6/19 grew MRSA.  ---   ---92.9---104--->168 --->177.21  ---   WBC 17.0 ---> ---> ---> ---> ---> 15.2 ---> 12.9  ---   S/p IV Vancomycin 6/13 - 6/25  ---   S/p IV Zosyn since 6/22 - 6/25  ---   S/p ceftriaxone added 6/18 - 6/22  ---   Continue Levaquin 750 mg po q48 hr, started on 6/25  ---   Continue Doxycycline 100 mg po bid, started on 6/25  ---   ID, plastic and orthopedics consult services following  ---   to OR today for I&D by Ortho    Chronic atrial " fibrillation  ---   PAF 55% in 9/2019  ---   in NSR during this eval  ---   Continue metoprolol for rate control  ---   [id number] score of at least 5   ---   Continue Coumadin for stroke prophylaxis  ---   INR 2.28 today    HTN  ---   Continue home Norvasc and metoprolol with holding parameters .     Acute renal insuffiency  on CKD3  ---   Baseline Cr ~ 1.2 - 1.5  ---   Admit Cr was 1.62 ---> 1.26 ---> ---> ---> 1.79 ---> 1.57 ---> 1.46 today  ---   Avoid nephrotoxins o/t IV abx     T2DM  ---  Hgba1 was 7.0% on 6/14/24  ---   Glucose under reasonable control values around 150.  ---   Glipizide held since admission.  ---   Continue home Lantus 10 units at bedtime  ---   Continue Novolog sliding scale insulin      HLD  ---   Continue home Pravachol.     Mild cognitive impairment  ---   Family notes that patient has been a bit more confused than her usual baseline since admission.  ---   May be related to oxycodone.  ---   OT consulted for slums assessment.            Diet: Snacks/Supplements Adult: Glucerna; With Meals  Advance Diet as Tolerated: Clear Liquid Diet    DVT Prophylaxis: Warfarin  Villalobos Catheter: PRESENT, indication: Acute retention or obstruction  Lines: None     Cardiac Monitoring: None  Code Status: No CPR- Do NOT Intubate    Disposition Plan    Per Ortho rec              Joe Arshad MD  Hospitalist Service, GOLD TEAM 18  [facility]  Securely message with [email or web address] (more info)  Text page via [email or web address] Paging/Directory   See signed in provider for up to date coverage information  ______________________________________________________________________    Interval History   Patient has been n.p.o. since midnight  INR does not appear to have been reversed by pharmacy  Right lower extremity pain erythema continue to improve  Patient did not have any complaint during my eval  To OR this morning for right shin abscess I&D      Physical Exam   Vital Signs: Temp: 97.8   F (36.6  C) Temp src: Oral BP: (!) 141/50 Pulse: 73   Resp: 18 SpO2: 100 % O2 Device: None (Room air) Oxygen Delivery: 2 LPM  Weight: 142 lbs 10.2 oz  General appearence: awake alert  in  no apparent distress  RESPIRATORY: lungs clear    CARDIOVASCULAR:S1 S2 regular rate and rhythm, no rubs gallops or murmurs appreciated  GASTROINTESTINAL:soft, non-distended , non-tender , + bowel sounds,   SKIN: warm and dry, no mottling noted   NEUROLOGIC; awake alert and oriented, no focal deficits found  EXTREMITIES: no clubbing, cyanosis or edema ,  able to wigged toes in right, no calf tenderness , calf soft , see images of wound      6/23:            Data     I have personally reviewed the following data over the past 24 hrs:    11.7 (H)  \   10.6 (L)   / 361     143 102 25.1 (H) /  132 (H)   5.1 26 1.46 (H) \     Procal: N/A CRP: 65.27 (H) Lactic Acid: N/A       INR:  2.20 (H) PTT:  N/A   D-dimer:  N/A Fibrinogen:  N/A       Imaging results reviewed over the past 24 hrs:   No results found for this or any previous visit (from the past 24 hour(s)).

## 2024-06-26 NOTE — PROGRESS NOTES
I saw and examined this pleasant 84-year-old patient preoperatively this morning in the preop area at [facility]. The diagnosis, treatment plan, and the risks, benefits, and alternatives to surgery were all discussed in layman's terms. All questions were answered. The correct surgical site was marked with patient participation. All questions were answered.

## 2024-06-26 NOTE — PROGRESS NOTES
Brief orthopedic update:    Have discussed with plastic surgery about wound management and asked plastic surgery to be available tomorrow morning for I&D of the right lower extremity.  Per plastic surgery, they report somebody may not be available to join intraoperatively for soft tissue management.  However, they recommend if nobody is able to join from their team, to pack with Vashe soaked packing and avoid wound vac given anticoagulation. Once no further debridement is needed, plastics will plan to take over wound management.     Renae Bello MD PGY1  Orthopedic Surgery

## 2024-06-26 NOTE — ANESTHESIA PROCEDURE NOTES
Airway       Patient location during procedure: OR  Staff -        Anesthesiologist:  Kiko Street MD       CRNA: Nghia Dobbins APRN CRNA       Performed By: CRNA  Consent for Airway        Urgency: elective  Indications and Patient Condition       Indications for airway management: marvel-procedural       Induction type:intravenous       Mask difficulty assessment: 1 - vent by mask    Final Airway Details       Final airway type: supraglottic airway    Supraglottic Airway Details        Type: LMA       Brand: Air-Q       LMA size: 4    Post intubation assessment        Placement verified by: capnometry, equal breath sounds and chest rise        Number of attempts at approach: 1       Secured with: tape       Ease of procedure: easy       Dentition: Intact and Unchanged

## 2024-06-26 NOTE — ANESTHESIA POSTPROCEDURE EVALUATION
Patient: Francy Sherman    Procedure: Procedure(s):  Incision and drainage lower extremity       Anesthesia Type:  General    Note:  Disposition: Inpatient   Postop Pain Control: Uneventful            Sign Out: Well controlled pain   PONV: No   Neuro/Psych: Uneventful            Sign Out: Acceptable/Baseline neuro status   Airway/Respiratory: Uneventful            Sign Out: Acceptable/Baseline resp. status   CV/Hemodynamics: Uneventful            Sign Out: Acceptable CV status; No obvious hypovolemia; No obvious fluid overload   Other NRE: NONE   DID A NON-ROUTINE EVENT OCCUR?            Last vitals:  Vitals Value Taken Time   /56 06/26/24 0930   Temp 36.7  C (98.1  F) 06/26/24 0845   Pulse 67 06/26/24 0942   Resp 19 06/26/24 0942   SpO2 97 % 06/26/24 0943   Vitals shown include unfiled device data.    Electronically Signed By: Kiko Street MD  June 26, 2024  11:27 AM

## 2024-06-26 NOTE — PROGRESS NOTES
Orthopaedic Surgery Progress Note 06/26/2024    E: No acute events overnight.    S: Pain well controlled. States her swelling and pain has largely improved.     O:  Temp: 98.4  F (36.9  C) Temp src: Oral BP: (!) 143/52 Pulse: 81   Resp: 17 SpO2: 94 % O2 Device: None (Room air)      Exam:  Gen: No acute distress, resting comfortably in bed.  Resp: Non-labored breathing    MSK:    RLE  Erythema and swelling improved   Fires EHL/FHL/TA/Gsc  SILT in sp/dp/s/s/t nerve distributions   2+ DP        Recent Labs   Lab 06/26/24  0558 06/25/24  0600 06/24/24  0733 06/23/24  0708 06/22/24  0857   WBC 11.7* 12.9* 15.2*   < >  --    HGB 10.6* 10.0*  --   --  10.8*    324  --   --   --     < > = values in this interval not displayed.         Assessment: Francy Sherman is a 85 yo female s/p excision of squamous cell carcimona of the right lower leg ~ 6/4/2024 admitted to [facility] on 6/14 with cellulitis around the excision site. Wound culture grew MRSA. MRI at [facility] demonstrated a 1 x 1 x 2 cm fluid collection at the excision site.     Plan for I&D RLE today with Dr. Joie CARDENAS for OR  Plastic surgery to manage wound post op      Elder Holley MD 06/26/2024  Orthopaedic Surgery Resident, PGY4

## 2024-06-26 NOTE — PLAN OF CARE
Goal Outcome Evaluation:      Plan of Care Reviewed With: patient    Overall Patient Progress: no changeOverall Patient Progress: no change     Pt AO4. Pt wound on R.shin covered with mepilex. Pt has a chronic schilling catheter and was worried that she was not voiding. Notified of output of 450 ml with clear urine. Pt stated that she did not feel any pain as she did a couple hrs ago before irrigation was completed. Pt not oob.     Pt NPO after midnight. Pt noted that she was not aware of the oncoming procedure. Pre-op checklist completed, vitals completed, and CHG baths X 2 completed. Pre-op RN notified, provider requested to come to the unit and discuss and complete consent form.     Pt off the unit at 0700 for procedure.

## 2024-06-26 NOTE — PLAN OF CARE
"  Problem: Adult Inpatient Plan of Care  Goal: Patient-Specific Goal (Individualized)  Description: You can add care plan individualizations to a care plan. Examples of Individualization might be:  \"Parent requests to be called daily at 9am for status\", \"I have a hard time hearing out of my right ear\", or \"Do not touch me to wake me up as it startles  me\".  Outcome: Progressing  Goal: Absence of Hospital-Acquired Illness or Injury  Outcome: Progressing  Intervention: Identify and Manage Fall Risk  Recent Flowsheet Documentation  Taken 6/25/2024 2200 by Shira Butterfield RN  Safety Promotion/Fall Prevention:   assistive device/personal items within reach   clutter free environment maintained   increased rounding and observation   increase visualization of patient   lighting adjusted   nonskid shoes/slippers when out of bed   patient and family education   room near nurse's station   room organization consistent   safety round/check completed  Intervention: Prevent Skin Injury  Recent Flowsheet Documentation  Taken 6/25/2024 2200 by Shira Butterfield RN  Body Position: position changed independently  Skin Protection: adhesive use limited  Device Skin Pressure Protection: absorbent pad utilized/changed  Intervention: Prevent and Manage VTE (Venous Thromboembolism) Risk  Recent Flowsheet Documentation  Taken 6/25/2024 2200 by Shira Butterfield RN  VTE Prevention/Management: patient refused intervention  Intervention: Prevent Infection  Recent Flowsheet Documentation  Taken 6/25/2024 2200 by Shira Butterfield RN  Infection Prevention:   rest/sleep promoted   single patient room provided  Goal: Optimal Comfort and Wellbeing  Outcome: Progressing  Intervention: Monitor Pain and Promote Comfort  Recent Flowsheet Documentation  Taken 6/25/2024 1954 by Shira Butterfield RN  Pain Management Interventions:   relaxation techniques promoted   rest   repositioned   medication (see MAR)  Taken 6/25/2024 1611 by Scout, " CLARA Silva  Pain Management Interventions:   relaxation techniques promoted   rest   repositioned  Goal: Readiness for Transition of Care  Outcome: Progressing  Flowsheets (Taken 6/25/2024 2335)  Transportation Anticipated: family or friend will provide  Concerns to be Addressed: discharge planning  Intervention: Mutually Develop Transition Plan  Recent Flowsheet Documentation  Taken 6/25/2024 2335 by Shira Butterfield RN  Transportation Anticipated: family or friend will provide  Concerns to be Addressed: discharge planning     Problem: Risk for Delirium  Goal: Optimal Coping  Outcome: Progressing  Goal: Improved Behavioral Control  Outcome: Progressing  Intervention: Minimize Safety Risk  Recent Flowsheet Documentation  Taken 6/25/2024 2200 by Shira Butterfield RN  Communication Enhancement Strategies: call light answered in person  Enhanced Safety Measures:   pain management   review medications for side effects with activity   room near unit station  Goal: Improved Attention and Thought Clarity  Outcome: Progressing  Intervention: Maximize Cognitive Function  Recent Flowsheet Documentation  Taken 6/25/2024 2200 by Shira Butterfield RN  Sensory Stimulation Regulation:   care clustered   lighting decreased   quiet environment promoted  Reorientation Measures:   calendar in view   clock in view  Goal: Improved Sleep  Outcome: Progressing   Goal Outcome Evaluation:         VS: BP (!) 141/56 (BP Location: Right arm)   Pulse 77   Temp 98.6  F (37  C) (Oral)   Resp 17   SpO2 93%      O2: Sating >90% on RA. Lung sounds clear. Denies chest pain and SOB.   Output:  Villalobos in place irrigation done this shift.  Pt. Uses BSC    Last BM: LBM 6/24   Activity: Up with 2 assist/ walker and GB NOOB this shift    Skin: Redness in folds and groin area  R leg cellulitis    Pain: Pain was managed with PRN oxy and robaxin this shift    CMS: A&Ox4. Denies N/T.    Dressing: Dressing to R shin  C/D/I.   Diet: Regular.  Appetite  BG checks with meals and at bedtime sliding scale    LDA: PIV to  R is S/L.    Equipment: IV pole, FWW, gait belt, and personal belongings. Call light within reach and uses appropriately.   Plan:     Additional Info: NPO at midnight

## 2024-06-26 NOTE — BRIEF OP NOTE
[facility]    Brief Operative Note    Pre-operative diagnosis: History of squamous cell carcinoma [Z85.89]  Wound infection [T14.8XXA, L08.9]  Post-operative diagnosis Same as pre-operative diagnosis    Procedure: Incision and drainage lower extremity, Possible Wound Vac Application, Biopsy, Possible Complex Wound Closure, Right - Leg    Surgeon: Surgeons and Role:     * Edgard Salter MD - Primary  Anesthesia: General   Estimated Blood Loss: Minimal    Drains: None  Specimens: * No specimens in log *  Findings:   See dictated op report  Complications: None.  Implants: * No implants in log *    Assessment/Plan: Francy Sherman is a 85 yo female who recently underwent excision of squamous cell carcimona of the right lower leg ~ 6/4/2024 admitted to [facility] on 6/14 with cellulitis around the excision site now s/p I&D RLE on 6/26/24 with Dr. Salter.    No further operative debridements anticipated. Wound packed with Vashe soaked gauze per Plastic surgery recs. Plastics to take over wound management going forward. Otherwise, resume previous cares per medicine/ID teams (antibiotics, anticoagulation, etc.).     Medicine Primary  Activity: Up with assist.  Weight bearing status: NWB RLE  Antibiotics: Per ID  Start doxycycline 100mg PO q12hrs  Start levofloxacin 750mg PO q48hrs  Diet: Begin with clear fluids and progress diet as tolerated.  DVT prophylaxis: per primary, okay to resume PTA AC starting POD1 from orthopedic perspective  Elevation: Elevate RLE as much as possible.  Wound Care: keep dressing in place until plastic surgery plan established  Pain management: Transition from IV to orals as tolerated.   X-rays:  complete  PT/OT: eval and treat  Labs: per primary  Consults: PT, OT. , appreciate assistance in caring for this patient.  Follow-up: per plastics    Disposition: per plastics    Ulises Alvarado MD  Orthopaedic Surgery, PGY-4  Pager: [phone]

## 2024-06-26 NOTE — PROGRESS NOTES
Plastic Surgery Progress Note    POD0 from I&D of right anterior leg with orthopedic surgery. Doing well post operatively, minimal pain.     Temp:  [97.8  F (36.6  C)-98.6  F (37  C)] 97.8  F (36.6  C)  Pulse:  [69-81] 76  Resp:  [13-18] 18  BP: (117-151)/(44-82) 151/52  SpO2:  [89 %-100 %] 93 %    Intake/Output Summary (Last 24 hours) at 6/26/2024 1247  Last data filed at 6/26/2024 0845  Gross per 24 hour   Intake 400 ml   Output 2050 ml   Net -1650 ml       General: Alert, well-appearing in no acute distress.  Respiratory: Non-labored breathing  Cardiovascular: Regular rate  Gastrointestinal: Abdomen non-distended  Extremities: wwp   Skin: No obvious rashes     RLE: Wrapped in ace, kerlix and vashe soaked gauze to wound. Per operative report 3.5x1.5cm defect in anterior leg with healthy bleeding tissue at the wound base. Packed with vashe soaked gauze.     Francy Sherman is a 84 year old F s/p 6/26 I&D RLE abscess with orthopedics after SCC excision and closure at outpatient dermatology, plastic surgery following for assistance with wound management    - BID dressing changes to the anterior leg wound with vashe soaked gauze, kerlix and ace wrap. (Wound care order placed for you).   - Do not recommend wound vac due to patient being on coumadin and therapeutic INR  - Recommend high protein diet with supplements.   - Plan for healing by secondary intention  - Due to patients functional status would likely benefit from TCU placement.      Babar Romero MD PGY-2  Plastic and Reconstructive Surgery Resident  Text page on call resident via [facility] Paging/Directory

## 2024-06-26 NOTE — OR NURSING
PACU to Inpatient Nursing Handoff    Patient Francy Sherman is a 84 year old female who speaks English.   Procedure Procedure(s):  Incision and drainage lower extremity   Surgeon(s) Primary: Edgard Salter MD     Allergies   Allergen Reactions    Macrobid [Nitrofurantoin] Rash       Isolation  Contact     Past Medical History   has no past medical history on file.    Anesthesia General   Dermatome Level     Preop Meds Not applicable   Nerve block Not applicable   Intraop Meds fentanyl (Sublimaze): 100 mcg total  hydromorphone (Dilaudid): 0.2 mg total  ondansetron (Zofran): last given at 0827   Local Meds No   Antibiotics cefazolin (Ancef) - last given at 0750     Pain Patient Currently in Pain: denies   PACU meds  Not applicable   PCA / epidural No   Capnography     Telemetry ECG Rhythm: Sinus rhythm   Inpatient Telemetry Monitor Ordered? No        Labs Glucose Lab Results   Component Value Date     06/26/2024     06/26/2024     04/18/2022       Hgb Lab Results   Component Value Date    HGB 10.6 06/26/2024       INR Lab Results   Component Value Date    INR 2.20 06/26/2024      PACU Imaging Not applicable     Wound/Incision Wound Leg Other (comment) (Active)   Wound Bed Other (Comment) 06/25/24 2200   Charis-wound Assessment Erythema 06/25/24 0850   Estimated Circumference ( if not measured quarter sized 06/22/24 0900   Drainage Amount UTV 06/26/24 0915   Drainage Color/Characteristics Creamy 06/25/24 0850   Wound Care/Cleansing Wound cleanser 06/25/24 0850   Dressing Per Plan of Care 06/26/24 0915   Dressing Status Clean, dry, intact 06/26/24 0915   Dressing Change Due 06/26/24 06/25/24 0850   Number of days: 12       Incision/Surgical Site 06/26/24 Anterior;Right Tibial (Active)   Incision Assessment UTV 06/26/24 0915   Dressing Per Plan of Care 06/26/24 0845   Charis-Incision Assessment UTV 06/26/24 0845   Closure SHIV 06/26/24 0915   Incision Drainage Amount UTV 06/26/24 0915   Drainage  Description UTV 06/26/24 0915   Dressing Intervention Clean, dry, intact 06/26/24 0915   Number of days: 0      CMS        Equipment Not applicable   Other LDA       IV Access Peripheral IV 06/22/24 Anterior;Right Hand (Active)   Site Assessment WDL 06/26/24 0845   Line Status Saline locked 06/26/24 0845   Dressing Transparent 06/26/24 0845   Dressing Status clean;intact;old drainage 06/26/24 0845   Dressing Intervention Dressing reinforced 06/26/24 0845   Line Intervention Flushed 06/26/24 0845   Phlebitis Scale 0-->no symptoms 06/26/24 0845   Infiltration? no 06/26/24 0845   Number of days: 4       Peripheral IV 06/26/24 Right Lower forearm (Active)   Site Assessment [facility] 06/26/24 0845   Line Status Infusing 06/26/24 0845   Dressing Transparent 06/26/24 0845   Dressing Status clean;dry;intact 06/26/24 0845   Phlebitis Scale 0-->no symptoms 06/26/24 0845   Infiltration? no 06/26/24 0845   Number of days: 0      Blood Products Not applicable EBL 0 mL   Intake/Output Date 06/26/24 0700 - 06/27/24 0659   Shift 3440-8609 6006-6739 5012-1043 24 Hour Total   INTAKE   I.V. 400   400   Shift Total(mL/kg) 400(6.18)   400(6.18)   OUTPUT   Urine 300   300   Shift Total(mL/kg) 300(4.64)   300(4.64)   Weight (kg) 64.7 64.7 64.7 64.7      Drains / Schilling Urethral Catheter 06/22/24 (Active)   Tube Description Positional 06/26/24 0845   Catheter Care Catheter wipes;Done 06/26/24 0500   Collection Container Standard 06/26/24 0915   Securement Method Securing device (Describe) 06/26/24 0915   Rationale for Continued Use Acute retention or obstruction 06/26/24 0915   Urine Output 300 mL 06/26/24 0845   Number of days: 4      Time of void PreOp Time of Void Prior to Procedure:  (schilling) (06/26/24 0704)    PostOp Voided (mL): 850 mL (06/25/24 2231)    Diapered? No   Bladder Scan Bladder Scan Volume (mL): 999 ml (06/24/24 1626)   PO    tolerating sips     Vitals    B/P: (!) 147/64  T: 98.1  F (36.7  C)    Temp src: Axillary  P:  Pulse: 78  (06/26/24 0915)          R: 18  O2:  SpO2: 91 %    O2 Device: None (Room air) (06/26/24 0915)    Oxygen Delivery: 4 LPM (06/26/24 0915)         Family/support present Brother called to check on patient   Patient belongings     Patient transported on cart   DC meds/scripts (obs/outpt) Not applicable   Inpatient Pain Meds Released? Yes       Special needs/considerations None   Tasks needing completion None       Saloni Sorto RN

## 2024-06-26 NOTE — OP NOTE
DATE OF SURGERY:  06/26/2024.     PREOPERATIVE DIAGNOSIS:  Right leg abscess.    POSTOPERATIVE DIAGNOSIS:  Right leg abscess.     PROCEDURE:  Debridement and irrigation of right leg abscess.     SURGEON:  Edgard Salter MD.     ANESTHESIA:  General laryngeal mask airway.     COMPLICATIONS:  None apparent intraoperatively or immediately postoperatively.     SIGNIFICANT FINDINGS:  Purulence seen in the area of the abscess noted on the preop MRI scan in the region of the tibialis anterior and EHL muscle bellies.  Technique:  Cutting, curettting.  Instruments:  Scalpel, scissors, Adson elevator.  Nature of debrided tissue:  Friable, loosely attached, nonviable skin, subcutaneous tissue, and muscle; purulent fluid.  Appearance of wound after:  Down to healthy, bleeding tissue.  Area of debridement:  Approximately 3.5 cm proximo-distal x  1.5 cm medio-lateral x 1.5 cm deep.  Depth of debridement:  Down to and including muscle.     SPECIMENS SENT:  Tissue and fluid cultures sent to microbiology for gram stain, aerobic culture, anaerobic culture, and fungal culture.  Tissue specimen from skin margin at the 8 o'clock position sent to pathology.     DRAINS:  None.     ESTIMATED BLOOD LOSS:  5 mL.    TOURNIQUET TIME:  0 minutes.      INDICATIONS:  Francy Sherman is a pleasant 84-year-old female patient with a history of right anterior leg squamous cell carcinoma who appears to have developed an open wound with underlying abscess at the site of the resection.  The recommendation was made for debridement and irrigation.  The diagnosis, nature of the recommended procedure, the risks, benefits, and alternatives, and the postoperative plan were all discussed with the patient in layman's terms. No guarantees were expressed or implied as to outcome.  The patient had the opportunity to have all questions at the time asked and answered, verbalized understanding of this discussion, and verbalized the wish to proceed with the planned  procedure.  Written informed consent was obtained.    DESCRIPTION OF PROCEDURE:             The patient, Francy Sherman, was met in the preoperative area where the correct surgical site was identified with patient participation and marked by the primary surgeon.  All questions were answered.  The patient was then brought to the operating room, where she was carefully positioned supine on the OR table with all bony prominences well padded and a safety strap across the torso.  The anesthesia team successfully induced general anesthesia and placed a laryngeal mask airway.  The skin over the planned surgical site was inspected and was notable for necrotic open wound over the anterior right leg.  Intravenous antibiotics were administered consisting of cefazolin.  Venous thromboembolic prophylaxis was performed with sequential compression devices on the lower extremity.  A soft bump was placed under the hip of the operative lower extremity to keep the toes pointing upwards.  A tourniquet was placed on the distal thigh of the operative lower extremity as a precaution but never inflated or required for the procedure.  The patient's operative lower extremity was then prepped with betadine scrub and paint and draped free in the usual sterile fashion.  Prior to proceeding with the operation, a timeout was held per hospital policy correctly identifying the patient, procedure to be performed, and operative site including laterality.  All in the room agreed.    Using a scalpel, a small amount of frankly necrotic dry eschar consisting of skin and a superficial layer of subcutaneous tissue was sharply excised over the wound to healthy bleeding edges.  A very minimal amount of the surrounding rim of skin was excised.  Deep to the necrotic tissue debrided there was grossly evident necrotic subcutaneous fat, fascia, and muscle that was sharply excised and portions sent to microbiology.  There was visible purulent fluid leaking from an  abscess in the region of the tibialis anterior and EHL muscle bellies that was bluntly and thoroughly explored with scissors, and all fluid was manually expressed.  This fluid was aspirated and sent to microbiology.  No further abscesses or sinus tracts were noted.  The wound did not appear to track to bone.  The entire wound was thoroughly and gently curetted including skin, subcutaneous tissue, fascia, and muscle, and then irrigated with 3 L of sterile normal saline using cystoscopy tubing.  A small sample of skin at the 8 o'clock position of the wound was biopsied and sent to pathology.  At the conclusion of the debridement and irrigation the wound appeared quite clean and dry.  Meticulous hemostasis was achieved.  The wound was packed open at the instructions of the Plastic surgery team with Vashe-soaked gauze.  Sterile dressings were applied, followed by an ace wrap.  Care was taken to cover all skin with sterile cast padding underneath the ace wrap and to not apply it tightly.  All surgical counts were reported as correct at the end of the case.  The patient was then carefully and safely transferred to the [facility] in the supine position, and then taken to the recovery room in stable condition.  I was present and scrubbed in for the entire case.      Edgard Salter MD  Attending surgeon  Orthopaedic Surgery&D

## 2024-06-26 NOTE — ANESTHESIA PREPROCEDURE EVALUATION
Anesthesia Pre-Procedure Evaluation    Patient: Francy Sherman   MRN: [id number] : 1940        Procedure : Procedure(s):  Incision and drainage lower extremity, Possible Wound Vac Application, Biopsy, Possible Complex Wound Closure          No past medical history on file.   Past Surgical History:   Procedure Laterality Date    CHOLECYSTECTOMY        Allergies   Allergen Reactions    Macrobid [Nitrofurantoin] Rash      Social History     Tobacco Use    Smoking status: Former     Types: Cigarettes    Smokeless tobacco: Never   Substance Use Topics    Alcohol use: Never      Wt Readings from Last 1 Encounters:   24 64.7 kg (142 lb 10.2 oz)        Anesthesia Evaluation            ROS/MED HX  ENT/Pulmonary:       Neurologic:       Cardiovascular:     (+) Dyslipidemia hypertension- -   -  - -                        dysrhythmias (RBBB), a-fib,             METS/Exercise Tolerance:     Hematologic:       Musculoskeletal:       GI/Hepatic:       Renal/Genitourinary:     (+) renal disease, type: CRI,            Endo:     (+)  type II DM,                    Psychiatric/Substance Use:       Infectious Disease:       Malignancy:       Other:               OUTSIDE LABS:  CBC:   Lab Results   Component Value Date    WBC 11.7 (H) 2024    WBC 12.9 (H) 2024    HGB 10.6 (L) 2024    HGB 10.0 (L) 2024    HCT 33.2 (L) 2024    HCT 31.9 (L) 2024     2024     2024     BMP:   Lab Results   Component Value Date     2024     2024    POTASSIUM 4.1 2024    POTASSIUM 5.2 2024    CHLORIDE 105 2024    CHLORIDE 100 2024    CO2 24 2024    CO2 23 2024    BUN 31.0 (H) 2024    BUN 38.7 (H) 2024    CR 1.57 (H) 2024    CR 1.79 (H) 2024    CR 1.79 (H) 2024     (H) 2024     (H) 2024     COAGS:   Lab Results   Component Value Date    PTT 64 (H) 2024    INR 2.48 (H)  "06/25/2024     POC: No results found for: \"BGM\", \"HCG\", \"HCGS\"  HEPATIC:   Lab Results   Component Value Date    ALBUMIN 3.2 (L) 06/16/2024    PROTTOTAL 6.0 (L) 06/16/2024    ALT 97 (H) 06/16/2024    AST 50 (H) 06/16/2024    ALKPHOS 115 06/16/2024    BILITOTAL 0.5 06/16/2024     OTHER:   Lab Results   Component Value Date    PH 7.24 (LL) 09/20/2019    LACT 0.9 06/14/2024    A1C 7.0 (H) 06/14/2024    KIRBY 8.9 06/25/2024    PHOS 5.0 (H) 09/22/2019    MAG 2.0 09/21/2019    TSH 2.22 04/18/2022    SED 88 (H) 06/22/2024       Anesthesia Plan    ASA Status:  3    NPO Status:  NPO Appropriate    Anesthesia Type: General.     - Airway: LMA   Induction: Intravenous.   Maintenance: Balanced.        Consents    Anesthesia Plan(s) and associated risks, benefits, and realistic alternatives discussed. Questions answered and patient/representative(s) expressed understanding.     - Discussed:     - Discussed with:  Patient            Postoperative Care            Comments:               Kiko Street MD    I have reviewed the pertinent notes and labs in the chart from the past 30 days and (re)examined the patient.  Any updates or changes from those notes are reflected in this note.             "

## 2024-06-26 NOTE — PROGRESS NOTES
VS: /45   Pulse 73   Temp 97.8  F (36.6  C) (Oral)   Resp 18   Wt 64.7 kg (142 lb 10.2 oz)   SpO2 96%   BMI 23.74 kg/m      O2: RA   Output: Villalobos in place   Last BM: 6/26   Activity: SBA to 1 assist when OOB, she is NWB on RLE   Skin: Intact except for RLE    Pain: denies   CMS: Intact, denies numbness or tingling   Dressing: Ace wrap on RLE, CDI   Diet: Reg diet   LDA: PIVs in R hand & RFA   Equipment: COOKIE, walker, BSC   Plan: Discharge to [facility] possibly tomorrow   Additional Info:

## 2024-06-26 NOTE — ANESTHESIA CARE TRANSFER NOTE
Patient: Francy Sherman    Procedure: Procedure(s):  Incision and drainage lower extremity       Diagnosis: History of squamous cell carcinoma [Z85.89]  Wound infection [T14.8XXA, L08.9]  Diagnosis Additional Information: No value filed.    Anesthesia Type:   General     Note:    Oropharynx: oropharynx clear of all foreign objects  Level of Consciousness: drowsy  Oxygen Supplementation: face mask  Level of Supplemental Oxygen (L/min / FiO2): 6  Independent Airway: airway patency satisfactory and stable  Dentition: dentition unchanged  Vital Signs Stable: post-procedure vital signs reviewed and stable  Report to RN Given: handoff report given  Patient transferred to: PACU    Handoff Report: Identifed the Patient, Identified the Reponsible Provider, Reviewed the pertinent medical history, Discussed the surgical course, Reviewed Intra-OP anesthesia mangement and issues during anesthesia, Set expectations for post-procedure period and Allowed opportunity for questions and acknowledgement of understanding      Vitals:  Vitals Value Taken Time   /75    Temp 98.1    Pulse 78 06/26/24 0855   Resp 15 06/26/24 0855   SpO2 99 % 06/26/24 0855   Vitals shown include unfiled device data.    Electronically Signed By: MICHAEL Gustafson CRNA  June 26, 2024  8:55 AM

## 2024-06-26 NOTE — PROGRESS NOTES
[facility] ID Service: Follow Up Note      Patient:  Francy Sherman   Date of birth 1940, Medical record number [id number]  Date of Visit:  06/26/2024  Date of Admission: 6/22/2024         Assessment and Recommendations:   ID Problem List:  RLE cellulitis, MRSA on culture  - s/p I&D 6/26/24, cultures pending  Non-healing anterior shin wound s/p recent biopsies  MRI w/ fluid collection 1.2 x1x2.2cm, which appeared to be communicating with anterior tibialis, c/f possible septic tenosynovitis, likely anterior compartment myositis of right leg  Recent excisional skin bx RLE with hypertrophic lichen planus, squamous cell carcinoma with unclear margins (6/10/24)  Previous bx 6/4 resulted benign (possibly not deep enough)  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    Culture with MRSA, was started on vancomycin on 6/14. Has had ongoing purulence and erythema at site with elevated inflammatory markers. Ceftriaxone was added 6/19. MRI 6/21 with fluid collection 1.2 x1x2.2cm, which appeared to be communicating with anterior tibialis, c/f possible septic tenosynovitis, likely anterior compartment myositis of right leg. Went to OR on 6/26 with orthopedics- found to have necrotic tissue, subcutaneous fat and fascia with purulence from abscess in area of tibialis anterior and EHL; did not appear to track to bone; cultures were sent.    Antibiotics  expanded to vancomycin + Zosyn with improvement x48-72 hours. Ideally, will avoid IV abx if able due to risk of side effects and adverse events. Transitioned to PO doxycycline (MRSA) and levofloxacin (gram negatives, Pseudomonas, some anaerobes) on 6/25. Will continue to monitor wound. Suspect that surgery/source control will help.      Recs were discussed with primary team today. Don't hesitate to call with questions.     Attestation:  I have reviewed today's vital signs, medications, labs and imaging.    Patricia Murray PA-C  Pronouns: she/her/hers  Infectious Diseases  Contact via [employer] or [email or web address] Paging/Directory       40 MINUTES SPENT BY ME on the date of service doing chart review, history, exam, documentation & further activities per the note.             Interval History:       Afebrile, VSS. Went to OR this AM for I&D with necrotic tissue and abscess encountered. Feeling fairly well, worried about having more pain after surgery but doing well so far. No fevers, chills, nausea, vomiting. Discussed antibiotic plan.         Current Antimicrobials   Current:  - doxycycline 6/25-present  - levofloxacin 6/25-present    Prior:  - vancomycin 6/14-6/25  - Zosyn 6/23 - 6/25  - ceftriaxone 6/18-6/23  - cefepime 6/14         Physical Exam:   Ranges for vital signs:  Temp:  [97.8  F (36.6  C)-98.6  F (37  C)] 97.8  F (36.6  C)  Pulse:  [69-81] 73  Resp:  [13-18] 18  BP: (117-151)/(44-82) 139/45  SpO2:  [89 %-100 %] 96 %    Intake/Output Summary (Last 24 hours) at 6/24/2024 1531  Last data filed at 6/23/2024 2243  Gross per 24 hour   Intake --   Output 400 ml   Net -400 ml     Exam:  GENERAL:  well-developed, well-nourished, supine in bed in no acute distress.   LUNGS:  CTAB. Normal respiratory effort on RA.  CARDIAC: RRR, +S1/S2.  EXT: Extremities warm. No LE edema. DP pulses intact bilaterally.  SKIN:  No acute rashes, jaundice, or diaphoresis. Ace wrap in place over RLE, wound not visible.  NEUROLOGIC:  Active x4  extremities           Laboratory Data:   Reviewed.  Pertinent for:    Culture data:  Culture   Date Value Ref Range Status   06/19/2024 3+ Staphylococcus aureus MRSA (A)  Final   06/14/2024 No Growth  Final     GS Culture   Date Value Ref Range Status   06/26/2024 See corresponding culture for results  Final   06/26/2024 See corresponding culture for results  Final       Inflammatory Markers    Recent Labs   Lab Test 06/26/24  0558 06/24/24  0733 06/22/24  0857 06/21/24  0537 06/19/24  0707 06/15/24  1158   SED  --   --  88* 82* 66* 58*   CRPI 65.27* 177.21* 168.00* 104.00* 92.90* 146.00*       Hematology Studies    Recent Labs   Lab Test 06/26/24  0558 06/25/24  0600 06/24/24  0733 06/23/24  0708 06/22/24  0857 06/17/24  0746 06/15/24  0631   WBC 11.7* 12.9* 15.2* 16.6*  --  10.3 13.9*   HGB 10.6* 10.0*  --   --  10.8* 10.8* 11.4*   MCV 94 95  --   --   --  95 94    324  --   --   --  201 162     No lab results found.    Metabolic Studies     Recent Labs   Lab Test 06/26/24  0558 06/25/24  0600 06/24/24  0733 06/23/24  0708 06/17/24  0746 06/16/24  0856    141 137  --   --  138   POTASSIUM 5.1 4.1 5.2  --   --  4.5   CHLORIDE 102 105 100  --   --  105   CO2 26 24 23  --   --  21*   BUN 25.1* 31.0* 38.7*  --   --  33.7*   CR 1.46* 1.57* 1.79*  1.79* 1.54*   < > 1.26*   GFRESTIMATED 35* 32* 28*  28* 33*   < > 42*    < > = values in this interval not displayed.       Hepatic Studies    Recent Labs   Lab Test 06/16/24  0856 06/15/24  0631 06/14/24  0744   BILITOTAL 0.5 0.4 0.7   ALKPHOS 115 120 89   ALBUMIN 3.2* 3.4* 3.7   AST 50* 99* 59*   ALT 97* 143* 72*            Imaging:     MRI Tibia Fibula RLE w/o & w/ contrast 6/21/24  IMPRESSION:  1.  Open wound redemonstrated anterolateral to the midshaft right tibia with adjacent cellulitis.  2.  Defined subcutaneous fluid collection deep to the wound in the anterior mid right leg soft tissues along the superficial muscle belly of the anterior tibialis,  approximately 1.2 x 1.0 x 2.2 cm.  3.  The collection appears to communicate with the anterior tibialis greater than extensor digitorum longus tendon sheaths, concerning for septic tenosynovitis.  4.  Likely anterior compartment myositis right leg near the wound.  5.  No evidence for right tibia or fibula acute osteomyelitis.

## 2024-06-26 NOTE — PROGRESS NOTES
I called the numbers listed for the patient's son Jose [phone] and her spouse Dae [phone] immediately postop. Both calls went to voicemail. Voicemails were left.

## 2024-06-27 LAB
ANION GAP SERPL CALCULATED.3IONS-SCNC: 12 MMOL/L (ref 7–15)
BASOPHILS # BLD AUTO: 0.1 10E3/UL (ref 0–0.2)
BASOPHILS NFR BLD AUTO: 1 %
BUN SERPL-MCNC: 27.3 MG/DL (ref 8–23)
CALCIUM SERPL-MCNC: 9.5 MG/DL (ref 8.8–10.2)
CHLORIDE SERPL-SCNC: 103 MMOL/L (ref 98–107)
CREAT SERPL-MCNC: 1.36 MG/DL (ref 0.51–0.95)
DEPRECATED HCO3 PLAS-SCNC: 25 MMOL/L (ref 22–29)
EGFRCR SERPLBLD CKD-EPI 2021: 38 ML/MIN/1.73M2
EOSINOPHIL # BLD AUTO: 0.2 10E3/UL (ref 0–0.7)
EOSINOPHIL NFR BLD AUTO: 2 %
ERYTHROCYTE [DISTWIDTH] IN BLOOD BY AUTOMATED COUNT: 11.9 % (ref 10–15)
GLUCOSE BLDC GLUCOMTR-MCNC: 131 MG/DL (ref 70–99)
GLUCOSE BLDC GLUCOMTR-MCNC: 165 MG/DL (ref 70–99)
GLUCOSE BLDC GLUCOMTR-MCNC: 168 MG/DL (ref 70–99)
GLUCOSE BLDC GLUCOMTR-MCNC: 178 MG/DL (ref 70–99)
GLUCOSE BLDC GLUCOMTR-MCNC: 99 MG/DL (ref 70–99)
GLUCOSE SERPL-MCNC: 111 MG/DL (ref 70–99)
HCT VFR BLD AUTO: 33.4 % (ref 35–47)
HGB BLD-MCNC: 11 G/DL (ref 11.7–15.7)
IMM GRANULOCYTES # BLD: 0.1 10E3/UL
IMM GRANULOCYTES NFR BLD: 1 %
INR PPP: 2.03 (ref 0.85–1.15)
LYMPHOCYTES # BLD AUTO: 1.5 10E3/UL (ref 0.8–5.3)
LYMPHOCYTES NFR BLD AUTO: 12 %
MCH RBC QN AUTO: 30.9 PG (ref 26.5–33)
MCHC RBC AUTO-ENTMCNC: 32.9 G/DL (ref 31.5–36.5)
MCV RBC AUTO: 94 FL (ref 78–100)
MONOCYTES # BLD AUTO: 1.1 10E3/UL (ref 0–1.3)
MONOCYTES NFR BLD AUTO: 9 %
NEUTROPHILS # BLD AUTO: 9.4 10E3/UL (ref 1.6–8.3)
NEUTROPHILS NFR BLD AUTO: 75 %
NRBC # BLD AUTO: 0 10E3/UL
NRBC BLD AUTO-RTO: 0 /100
PLATELET # BLD AUTO: 350 10E3/UL (ref 150–450)
POTASSIUM SERPL-SCNC: 4.2 MMOL/L (ref 3.4–5.3)
RBC # BLD AUTO: 3.56 10E6/UL (ref 3.8–5.2)
SODIUM SERPL-SCNC: 140 MMOL/L (ref 135–145)
WBC # BLD AUTO: 12.3 10E3/UL (ref 4–11)

## 2024-06-27 PROCEDURE — 85025 COMPLETE CBC W/AUTO DIFF WBC: CPT | Performed by: INTERNAL MEDICINE

## 2024-06-27 PROCEDURE — 85610 PROTHROMBIN TIME: CPT | Performed by: FAMILY MEDICINE

## 2024-06-27 PROCEDURE — 250N000013 HC RX MED GY IP 250 OP 250 PS 637: Performed by: FAMILY MEDICINE

## 2024-06-27 PROCEDURE — 250N000013 HC RX MED GY IP 250 OP 250 PS 637: Performed by: INTERNAL MEDICINE

## 2024-06-27 PROCEDURE — 99232 SBSQ HOSP IP/OBS MODERATE 35: CPT | Performed by: INTERNAL MEDICINE

## 2024-06-27 PROCEDURE — 120N000002 HC R&B MED SURG/OB UMMC

## 2024-06-27 PROCEDURE — 80048 BASIC METABOLIC PNL TOTAL CA: CPT | Performed by: INTERNAL MEDICINE

## 2024-06-27 PROCEDURE — 36415 COLL VENOUS BLD VENIPUNCTURE: CPT | Performed by: FAMILY MEDICINE

## 2024-06-27 PROCEDURE — 99233 SBSQ HOSP IP/OBS HIGH 50: CPT | Mod: 24 | Performed by: PHYSICIAN ASSISTANT

## 2024-06-27 PROCEDURE — 250N000011 HC RX IP 250 OP 636: Performed by: FAMILY MEDICINE

## 2024-06-27 PROCEDURE — 250N000013 HC RX MED GY IP 250 OP 250 PS 637: Performed by: STUDENT IN AN ORGANIZED HEALTH CARE EDUCATION/TRAINING PROGRAM

## 2024-06-27 RX ORDER — WARFARIN SODIUM 2 MG/1
2 TABLET ORAL
Status: DISCONTINUED | OUTPATIENT
Start: 2024-06-27 | End: 2024-06-27

## 2024-06-27 RX ORDER — WARFARIN SODIUM 2.5 MG/1
2.5 TABLET ORAL
Status: COMPLETED | OUTPATIENT
Start: 2024-06-27 | End: 2024-06-27

## 2024-06-27 RX ADMIN — INSULIN ASPART 1 UNITS: 100 INJECTION, SOLUTION INTRAVENOUS; SUBCUTANEOUS at 17:53

## 2024-06-27 RX ADMIN — LEVOFLOXACIN 750 MG: 250 TABLET, FILM COATED ORAL at 08:42

## 2024-06-27 RX ADMIN — OXYCODONE HYDROCHLORIDE 5 MG: 5 TABLET ORAL at 09:56

## 2024-06-27 RX ADMIN — PRAVASTATIN SODIUM 40 MG: 10 TABLET ORAL at 21:08

## 2024-06-27 RX ADMIN — ACETAMINOPHEN 650 MG: 325 TABLET, FILM COATED ORAL at 09:07

## 2024-06-27 RX ADMIN — OXYCODONE HYDROCHLORIDE 5 MG: 5 TABLET ORAL at 21:08

## 2024-06-27 RX ADMIN — WARFARIN SODIUM 2.5 MG: 2.5 TABLET ORAL at 17:53

## 2024-06-27 RX ADMIN — INSULIN ASPART 1 UNITS: 100 INJECTION, SOLUTION INTRAVENOUS; SUBCUTANEOUS at 22:30

## 2024-06-27 RX ADMIN — HYDROMORPHONE HYDROCHLORIDE 0.2 MG: 0.2 INJECTION, SOLUTION INTRAMUSCULAR; INTRAVENOUS; SUBCUTANEOUS at 12:09

## 2024-06-27 RX ADMIN — METHOCARBAMOL 500 MG: 500 TABLET ORAL at 17:06

## 2024-06-27 RX ADMIN — AMLODIPINE BESYLATE 5 MG: 5 TABLET ORAL at 08:42

## 2024-06-27 RX ADMIN — INSULIN ASPART 1 UNITS: 100 INJECTION, SOLUTION INTRAVENOUS; SUBCUTANEOUS at 12:52

## 2024-06-27 RX ADMIN — DOXYCYCLINE HYCLATE 100 MG: 100 CAPSULE ORAL at 08:42

## 2024-06-27 RX ADMIN — INSULIN GLARGINE 10 UNITS: 100 INJECTION, SOLUTION SUBCUTANEOUS at 22:31

## 2024-06-27 RX ADMIN — MICONAZOLE NITRATE: 20 POWDER TOPICAL at 20:55

## 2024-06-27 RX ADMIN — METOPROLOL SUCCINATE 25 MG: 25 TABLET, FILM COATED, EXTENDED RELEASE ORAL at 08:42

## 2024-06-27 RX ADMIN — METHOCARBAMOL 500 MG: 500 TABLET ORAL at 09:56

## 2024-06-27 RX ADMIN — DOXYCYCLINE HYCLATE 100 MG: 100 CAPSULE ORAL at 20:55

## 2024-06-27 RX ADMIN — MICONAZOLE NITRATE: 20 POWDER TOPICAL at 08:44

## 2024-06-27 RX ADMIN — OXYCODONE HYDROCHLORIDE 5 MG: 5 TABLET ORAL at 14:35

## 2024-06-27 ASSESSMENT — ACTIVITIES OF DAILY LIVING (ADL)
ADLS_ACUITY_SCORE: 46
ADLS_ACUITY_SCORE: 48
ADLS_ACUITY_SCORE: 46
ADLS_ACUITY_SCORE: 45
ADLS_ACUITY_SCORE: 46
ADLS_ACUITY_SCORE: 48

## 2024-06-27 NOTE — PROGRESS NOTES
"[facility]    Medicine Progress Note - Hospitalist Service, GOLD TEAM 18    Date of Admission:  6/22/2024    Assessment & Plan   Francy Sherman is an 85 yo female w/ h/o T2DM, chronic afib on warfarin, HTN, HLD, hypertrophic lichen planus ( newly diagnosed ) with squamous cell ca.  She was initially admitted to [facility] 6/14/2024 with right leg cellulitis.  MRI on 6/15 showed superficial soft tissue edema, repeat MRI on 6/21 discovered new fluid collection and possible anterior tibialis tenosynovitis despite being on IV ceftriaxone and IV vancomycin.  Wound culture 6/19 grew MRSA.  Transferred to [facility] on 6/22/24 for orthopedic surgery consultation.  Per Dr Batres, \" Dr. Mays from orthopaedics discussed pt's case with [facility] team and patient accepted to [facility] for medicine admission with orthopaedic surgery consultation\"    Right shin cellulitis w/ abscess  ---   Right lower extremity skin cancer extraction ~ 6/4/2024, path revealed hypertrophic lichen planus with squamous cell carcinoma with unclear margins.   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  ---   Wound culture 6/19 grew MRSA.  ---   ---92.9---104--->168 --->177.21 ---> 65.27  ---   WBC 17.0 ---> ---> ---> ---> ---> 15.2 ---> 12.9 ---> 11.7 ---> 12.3  ---   S/p I&D right shin abscess 6/26, intra-op cx grew 3+ MRSA, 2+ GPC and 2+ wbc  ---   S/p IV Vancomycin 6/13 - 6/25  ---   S/p IV Zosyn since 6/22 - 6/25  ---   S/p ceftriaxone added 6/18 - 6/22  ---   Continue Levaquin 750 mg po q48 hr, started on 6/25  ---   Continue Doxycycline 100 mg po bid, started on 6/25  ---  "  Wound care per plastic surgery  ---   ID consult service following  ---   Orthopedics consult service signed off    Chronic atrial fibrillation  ---   PAF 55% in 9/2019  ---   in NSR during this eval  ---   Continue metoprolol for rate control  ---   [id number] score at least 5   ---   Continue Coumadin for stroke prophylaxis  ---   INR 2.03 today    HTN  ---   Continue home Norvasc and metoprolol with holding parameters .     Acute renal insuffiency  on CKD3  ---   Baseline Cr ~ 1.2 - 1.5  ---   Admit Cr was 1.62 ---> 1.26 ---> ---> ---> 1.79 ---> 1.57 ---> 1.46 ---> 1.36 today  ---   Avoid nephrotoxins     T2DM  ---   Hgba1 was 7.0% on 6/14/24  ---   Fasting glucose 99  ---   Glipizide on hold since admit  ---   Continue home Lantus 10 units at bedtime  ---   Continue Novolog sliding scale insulin      HLD  ---   Continue home Pravachol.     Mild cognitive impairment  ---   Family notes that patient has been a bit more confused than her usual baseline since admission.  ---   May be related to oxycodone.  ---   OT consulted for slums assessment.            Diet: Snacks/Supplements Adult: Glucerna; With Meals  Regular Diet Adult    DVT Prophylaxis: Warfarin  Villalobos Catheter: PRESENT, indication: Acute retention or obstruction  Lines: None     Cardiac Monitoring: None  Code Status: No CPR- Do NOT Intubate    Disposition Plan    Per Ortho rec              Joe Arshad MD  Hospitalist Service, GOLD TEAM [address] [place] [address]  Securely message with [employer] (more info)  Text page via [employer] Paging/Directory   See signed in provider for up to date coverage information  ______________________________________________________________________    Interval History   Has more pain in her right shin today  Remained afebrile and hemodynamically stable  No other complaints  Uneventful night  Denies CP, SOB, nausea, vomiting or abdominal pain    Physical Exam   Vital Signs: Temp: 98.9  F (37.2   C) Temp src: Oral BP: (!) 155/63 Pulse: 81   Resp: 16 SpO2: 95 % O2 Device: Nasal cannula    Weight: 142 lbs 10.2 oz  General appearence: awake alert  in  no apparent distress  RESPIRATORY: lungs clear    CARDIOVASCULAR:S1 S2 regular rate and rhythm, no rubs gallops or murmurs appreciated  GASTROINTESTINAL:soft, non-distended , non-tender , + bowel sounds,   SKIN: warm and dry, no mottling noted   NEUROLOGIC; awake alert and oriented, no focal deficits found  EXTREMITIES: no clubbing, cyanosis or edema, able to wigged toes in right, no calf tenderness , calf soft , see images of wound              Data     I have personally reviewed the following data over the past 24 hrs:    12.3 (H)  \   11.0 (L)   / 350     140 103 27.3 (H) /  99   4.2 25 1.36 (H) \     INR:  2.03 (H) PTT:  N/A   D-dimer:  N/A Fibrinogen:  N/A       Imaging results reviewed over the past 24 hrs:   No results found for this or any previous visit (from the past 24 hour(s)).

## 2024-06-27 NOTE — PROGRESS NOTES
Plastic Surgery Progress Note    Subjective: Doing well post-op, pain significantly improved. Afebrile.    Temp:  [97.8  F (36.6  C)-98.6  F (37  C)] 98.1  F (36.7  C)  Pulse:  [69-81] 76  Resp:  [16-18] 16  BP: (139-151)/(45-64) 147/50  SpO2:  [89 %-100 %] 95 %    Intake/Output Summary (Last 24 hours) at 6/26/2024 1247  Last data filed at 6/26/2024 0845  Gross per 24 hour   Intake 400 ml   Output 2050 ml   Net -1650 ml       General: Alert, well-appearing in no acute distress.  Respiratory: Non-labored breathing  Cardiovascular: Regular rate  Gastrointestinal: Abdomen non-distended  Extremities: RLE: 3 cm x 2 cm x 2 cm wound is noted over the anterolateral aspect of right leg, surrounding blanching erythema, nontender to palpation, base of wound 4 by exudative debris and muscle, no palpable bone, no tunneling, no visible purulence, I was unable to express any purulence either, Patient was able to dorsiflex and plantarflex her right foot, some minimal tenderness over the dorsum of the foot, vascular exam of the foot and leg is otherwise unremarkable, strong DP and PT pulses.  Overall, from previous markings, erythema seems to be reducing, however it is not completely resolved.        Labs: WBC 12.3 (11.7)  Cultures: 6/26 Gram stain GPC- awaiting speciation       Francy Sherman is a 84 year old F s/p 6/26 I&D RLE abscess with orthopedics after SCC excision and closure at outpatient dermatology, plastic surgery following for assistance with wound management    - Recommend continued wound care; BID dressing changes to the anterior leg wound with vashe soaked gauze, ABD  and ace wrap.    - Given there is no exposed bone or tendon, we will plan to have this wound heal secondarily.  Patient does not need any form of surgical reconstruction from plastic surgery perspective.  - Do not recommend wound vac at this time, can re-assess if wound appears healthier.   - Atbx per ID   -Would leave weightbearing status upto  orthopedic team, but would recommend patient ambulate and weight-bear as tolerated to prevent stiffness  - Due to patients functional status would likely benefit from TCU placement.  - Recommend high protein diet with supplements.   - Recommend WOC consult; WOC to upload pictures during dressing changes.  - If patient remains inpatient, plastic surgery will follow weekly, if patient discharges to TCU, plastic surgery will coordinate follow-up in 2 weeks.    Discussed with staff Dr. Lily oMura MD PGY-4  Plastic and Reconstructive Surgery Resident  Text page on call resident via [facility] Paging/Directory

## 2024-06-27 NOTE — PHARMACY-ANTICOAGULATION SERVICE
Clinical Pharmacy - Warfarin Dosing Consult     Pharmacy has been consulted to manage this patient s warfarin therapy.  Indication: Atrial Fibrillation  Therapy Goal: INR 2-3  Provider/Team: DARIAN  Warfarin Prior to Admission: Yes  Warfarin PTA Regimen: 7.5 mg Mon, Wed, Friday, and 5 mg all other days  Significant drug interactions: doxycycline, levofloxacin (can increase INR)  Recent documented change in oral intake/nutrition: No  Dose Comments: Will give a decreased dose given drug interactions and that INR is still >2 after 5 held doses.    INR   Date Value Ref Range Status   06/27/2024 2.03 (H) 0.85 - 1.15 Final   06/26/2024 2.20 (H) 0.85 - 1.15 Final       Recommend warfarin 2.5 mg today.  Pharmacy will monitor Francy Sherman daily and order warfarin doses to achieve specified goal.      Please contact pharmacy as soon as possible if the warfarin needs to be held for a procedure or if the warfarin goals change.

## 2024-06-27 NOTE — PROGRESS NOTES
[facility] [place] ID Service: Follow Up Note      Patient:  Francy Sherman   Date of birth 1940, Medical record number [id number]  Date of Visit:  06/27/2024  Date of Admission: 6/22/2024         Assessment and Recommendations:   ID Problem List:  RLE cellulitis, MRSA on culture  - s/p I&D 6/26/24, cultures pending  Non-healing anterior shin wound s/p recent biopsies  MRI w/ fluid collection 1.2 x1x2.2cm, which appeared to be communicating with anterior tibialis, c/f possible septic tenosynovitis, likely anterior compartment myositis of right leg  Recent excisional skin bx RLE with hypertrophic lichen planus, squamous cell carcinoma with unclear margins (6/10/24)  Previous bx 6/4 resulted benign (possibly not deep enough)  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   Follow cultures from OR on 6/26- thus far S.aureus  Follow daily WBC and CRP q48hrs  ID will sign off at this time, please don't hesitate to contact the [facility] [place] [place] team should further questions arise.    Discussion:  Francy Sherman is an 84 year old female with history of type II DM, CKD3, and a.fib on warfarin, recent skin biopsy with SCC who was initially admitted to [facility] with cellulitis at biopsy site.    Culture with MRSA, was started on vancomycin on 6/14. Has had ongoing purulence and erythema at site with elevated inflammatory markers. Ceftriaxone was added 6/19. MRI 6/21 with fluid collection 1.2 x1x2.2cm, which appeared to be communicating with anterior tibialis, c/f possible septic tenosynovitis, likely anterior compartment myositis of right leg. Went to OR on 6/26 with orthopedics- found to have necrotic tissue, subcutaneous fat and fascia with purulence from abscess in area of  tibialis anterior and EHL; did not appear to track to bone; cultures with S.aureus (known from prior cx).    Antibiotics expanded to vancomycin + Zosyn with improvement x48-72 hours. Transitioned to PO doxycycline (MRSA) and levofloxacin (gram negatives, Pseudomonas, some anaerobes) on 6/25, tolerating well thus far. Plan to continue abx for at least 2 weeks until ID follow up, may need to extend due to depth infection.      Recs were discussed with primary team today. Don't hesitate to call with questions.     Attestation:  I have reviewed today's vital signs, medications, labs and imaging.    Patricia Murray PA-C  Pronouns: she/her/hers  Infectious Diseases  Contact via [employer] or [employer] Paging/Directory       50 MINUTES SPENT BY ME on the date of service doing chart review, history, exam, documentation & further activities per the note.             Interval History:       Was feeling well until dressing change this morning. More pain after this. No new symptoms, fever, chills, nausea, vomiting, diarrhea.          Current Antimicrobials   Current:  - doxycycline 6/25-present  - levofloxacin 6/25-present    Prior:  - vancomycin 6/14-6/25  - Zosyn 6/23 - 6/25  - ceftriaxone 6/18-6/23  - cefepime 6/14         Physical Exam:   Ranges for vital signs:  Temp:  [98.1  F (36.7  C)-98.6  F (37  C)] 98.1  F (36.7  C)  Pulse:  [73-81] 76  Resp:  [16-18] 16  BP: (139-151)/(45-52) 147/50  SpO2:  [93 %-96 %] 95 %    Intake/Output Summary (Last 24 hours) at 6/24/2024 1531  Last data filed at 6/23/2024 2243  Gross per 24 hour   Intake --   Output 400 ml   Net -400 ml     Exam:  GENERAL:  well-developed, well-nourished, supine in bed in no acute distress.   HEENT: atraumatic, normocephalic. Anicteric sclerae, noninjected conjunctivae.   LUNGS:  CTAB. Normal respiratory effort on RA.  CARDIAC: RRR, +S1/S2.  SKIN:  No acute rashes, jaundice, or diaphoresis. Ace wrap in place over RLE, wound not visible.  NEUROLOGIC:  Active x4  extremities           Laboratory Data:   Reviewed.  Pertinent for:    Culture data:  Culture   Date Value Ref Range Status   06/26/2024 Culture in progress  Preliminary   06/26/2024 1+ Staphylococcus aureus (A)  Preliminary   06/26/2024 Culture in progress  Preliminary   06/26/2024 1+ Staphylococcus aureus (A)  Preliminary   06/19/2024 3+ Staphylococcus aureus MRSA (A)  Final   06/14/2024 No Growth  Final     GS Culture   Date Value Ref Range Status   06/26/2024 See corresponding culture for results  Final   06/26/2024 See corresponding culture for results  Final       Inflammatory Markers    Recent Labs   Lab Test 06/26/24  0558 06/24/24  0733 06/22/24  0857 06/21/24  0537 06/19/24  0707 06/15/24  1158   SED  --   --  88* 82* 66* 58*   CRPI 65.27* 177.21* 168.00* 104.00* 92.90* 146.00*       Hematology Studies    Recent Labs   Lab Test 06/27/24  0645 06/26/24  0558 06/25/24  0600 06/24/24  0733 06/23/24  0708 06/22/24  0857 06/17/24  0746   WBC 12.3* 11.7* 12.9* 15.2*   < >  --  10.3   HGB 11.0* 10.6* 10.0*  --   --  10.8* 10.8*   MCV 94 94 95  --   --   --  95    361 324  --   --   --  201    < > = values in this interval not displayed.     No lab results found.    Metabolic Studies     Recent Labs   Lab Test 06/27/24  0645 06/26/24  0558 06/25/24  0600 06/24/24  0733    143 141 137   POTASSIUM 4.2 5.1 4.1 5.2   CHLORIDE 103 102 105 100   CO2 25 26 24 23   BUN 27.3* 25.1* 31.0* 38.7*   CR 1.36* 1.46* 1.57* 1.79*  1.79*   GFRESTIMATED 38* 35* 32* 28*  28*       Hepatic Studies    Recent Labs   Lab Test 06/16/24  0856 06/15/24  0631 06/14/24  0744   BILITOTAL 0.5 0.4 0.7   ALKPHOS 115 120 89   ALBUMIN 3.2* 3.4* 3.7   AST 50* 99* 59*   ALT 97* 143* 72*            Imaging:     MRI Tibia Fibula RLE w/o & w/ contrast 6/21/24  IMPRESSION:  1.  Open wound redemonstrated anterolateral to the midshaft right tibia with adjacent cellulitis.  2.  Defined subcutaneous fluid collection deep to the wound in the  anterior mid right leg soft tissues along the superficial muscle belly of the anterior tibialis, approximately 1.2 x 1.0 x 2.2 cm.  3.  The collection appears to communicate with the anterior tibialis greater than extensor digitorum longus tendon sheaths, concerning for septic tenosynovitis.  4.  Likely anterior compartment myositis right leg near the wound.  5.  No evidence for right tibia or fibula acute osteomyelitis.

## 2024-06-27 NOTE — PROGRESS NOTES
Orthopaedic Surgery Progress Note 06/27/2024    S: OR yesterday. No acute events overnight.  Pain controlled on current pain regimen. Questions answered. POC reviewed.    O:  Temp: 98.1  F (36.7  C) Temp src: Oral BP: (!) 147/50 Pulse: 76   Resp: 16 SpO2: 95 % O2 Device: None (Room air) Oxygen Delivery: 2 LPM    Exam:  Gen: No acute distress, resting comfortably in bed.  Resp: Non-labored breathing  MSK:  RLE:  - Dressings c/d/i  - SILT femoral/tibial/sural/saphenous/DP/SP nerves  - Fires Quad, TA, EHL, FHL, GaSC  - PT/DP pulses 2+, foot wwp    Recent Labs   Lab 06/27/24  0645 06/26/24  0558 06/25/24  0600   WBC 12.3* 11.7* 12.9*   HGB 11.0* 10.6* 10.0*    361 324       Assessment/Plan: Francy Sherman is a 85 yo female who recently underwent excision of squamous cell carcimona of the right lower leg ~ 6/4/2024 admitted to [facility] on 6/14 with cellulitis around the excision site now s/p I&D RLE on 6/26/24 with Dr. Salter.    Wound packed with Vashe soaked gauze per Plastic surgery recs. No further operative debridements anticipated from orthopedic perspective. Plastics to take over wound management going forward. Otherwise, resume previous cares per medicine/ID teams (antibiotics, anticoagulation, etc.).      Medicine Primary  Activity: Up with assist.  Weight bearing status: NWB RLE  Antibiotics: Per ID  Start doxycycline 100mg PO q12hrs  Start levofloxacin 750mg PO q48hrs  Diet: Begin with clear fluids and progress diet as tolerated.  DVT prophylaxis: per primary, okay to resume PTA AC starting POD1 from orthopedic perspective  Elevation: Elevate RLE as much as possible.  Wound Care: keep dressing in place until plastic surgery plan established  Pain management: Transition from IV to orals as tolerated.   X-rays:  complete  PT/OT: eval and treat  Labs: per primary  Consults: PT, OT. , appreciate assistance in caring for this patient.  Follow-up: per plastics     Disposition: per plastics    Orthopaedic team  will follow patient peripherally.  Please page with questions or concerns regarding the orthopaedic care of this patient.       Ulises Alvarado MD  Orthopaedic Surgery, PGY-4  Pager: [phone]

## 2024-06-27 NOTE — PROGRESS NOTES
VS:    O2: RA   Output: Voids in BSC   Last BM: 6/26   Activity: 1 assist w/ GB & walker   Skin: Intact except for R lower leg wound w/ dressing, CDI   Pain: Today pt has complained of pain all day since the plastics DR changed her dressing.     CMS: Intact denies numbness or tingling   Dressing: RLE dressing, CDI   Diet: reg   LDA: PIV in RFA & R hand   Equipment: GB, walker, BSC   Plan: Discharge to [facility] in [place]   Additional Info:

## 2024-06-27 NOTE — PLAN OF CARE
Goal Outcome Evaluation:      Plan of Care Reviewed With: patient    Overall Patient Progress: improvingOverall Patient Progress: improving    VS: BP (!) 147/50 (BP Location: Left arm, Patient Position: Semi-Vital's, Cuff Size: Adult Regular)   Pulse 76   Temp 98.1  F (36.7  C) (Oral)   Resp 16   Wt 64.7 kg (142 lb 10.2 oz)   SpO2 95%   BMI 23.74 kg/m      O2: Stable on room air, denies SOB, CP   Output: Villalobos cath in place.   Last BM: 6/26   Activity: SBA with assist of 1 with Gait belt and walker  NWB RLE   Skin: RLE surgical incision   Redness to groin and abdominal -powder applied   Pain: Pain managed with 5 mg oxycodone, Robaxin and Tylenol    Sore throat managed with lozenge   CMS: Intact,  denies N/T   Dressing: CDI RLE   Diet: Reg diet. BG checks 160, 131   LDA: Right PIV, Right hand PIV- SL   Plan: Discharge to [facility] today 6/27   Additional Info: Contact precaution maintained

## 2024-06-28 ENCOUNTER — TELEPHONE (OUTPATIENT)
Dept: WOUND CARE | Facility: CLINIC | Age: 84
End: 2024-06-28
Payer: COMMERCIAL

## 2024-06-28 LAB
ALBUMIN UR-MCNC: 30 MG/DL
ANION GAP SERPL CALCULATED.3IONS-SCNC: 14 MMOL/L (ref 7–15)
APPEARANCE UR: ABNORMAL
BACTERIA TISS BX CULT: ABNORMAL
BASOPHILS # BLD AUTO: 0.1 10E3/UL (ref 0–0.2)
BASOPHILS NFR BLD AUTO: 0 %
BILIRUB UR QL STRIP: NEGATIVE
BUN SERPL-MCNC: 30.3 MG/DL (ref 8–23)
CALCIUM SERPL-MCNC: 9.6 MG/DL (ref 8.8–10.2)
CHLORIDE SERPL-SCNC: 99 MMOL/L (ref 98–107)
COLOR UR AUTO: YELLOW
CREAT SERPL-MCNC: 1.3 MG/DL (ref 0.51–0.95)
CRP SERPL-MCNC: 51.78 MG/L
DEPRECATED HCO3 PLAS-SCNC: 23 MMOL/L (ref 22–29)
EGFRCR SERPLBLD CKD-EPI 2021: 41 ML/MIN/1.73M2
EOSINOPHIL # BLD AUTO: 0.1 10E3/UL (ref 0–0.7)
EOSINOPHIL NFR BLD AUTO: 1 %
ERYTHROCYTE [DISTWIDTH] IN BLOOD BY AUTOMATED COUNT: 12 % (ref 10–15)
GLUCOSE BLDC GLUCOMTR-MCNC: 162 MG/DL (ref 70–99)
GLUCOSE BLDC GLUCOMTR-MCNC: 164 MG/DL (ref 70–99)
GLUCOSE BLDC GLUCOMTR-MCNC: 165 MG/DL (ref 70–99)
GLUCOSE BLDC GLUCOMTR-MCNC: 169 MG/DL (ref 70–99)
GLUCOSE BLDC GLUCOMTR-MCNC: 191 MG/DL (ref 70–99)
GLUCOSE SERPL-MCNC: 167 MG/DL (ref 70–99)
GLUCOSE UR STRIP-MCNC: NEGATIVE MG/DL
HCT VFR BLD AUTO: 34.6 % (ref 35–47)
HGB BLD-MCNC: 11.2 G/DL (ref 11.7–15.7)
HGB UR QL STRIP: ABNORMAL
HYALINE CASTS: 4 /LPF
IMM GRANULOCYTES # BLD: 0.1 10E3/UL
IMM GRANULOCYTES NFR BLD: 1 %
INR PPP: 1.56 (ref 0.85–1.15)
KETONES UR STRIP-MCNC: 10 MG/DL
LEUKOCYTE ESTERASE UR QL STRIP: ABNORMAL
LYMPHOCYTES # BLD AUTO: 1.4 10E3/UL (ref 0.8–5.3)
LYMPHOCYTES NFR BLD AUTO: 8 %
MCH RBC QN AUTO: 30.3 PG (ref 26.5–33)
MCHC RBC AUTO-ENTMCNC: 32.4 G/DL (ref 31.5–36.5)
MCV RBC AUTO: 94 FL (ref 78–100)
MONOCYTES # BLD AUTO: 1.5 10E3/UL (ref 0–1.3)
MONOCYTES NFR BLD AUTO: 8 %
MUCOUS THREADS #/AREA URNS LPF: PRESENT /LPF
NEUTROPHILS # BLD AUTO: 14.6 10E3/UL (ref 1.6–8.3)
NEUTROPHILS NFR BLD AUTO: 82 %
NITRATE UR QL: NEGATIVE
NRBC # BLD AUTO: 0 10E3/UL
NRBC BLD AUTO-RTO: 0 /100
PH UR STRIP: 5.5 [PH] (ref 5–7)
PLATELET # BLD AUTO: 348 10E3/UL (ref 150–450)
POTASSIUM SERPL-SCNC: 4.4 MMOL/L (ref 3.4–5.3)
RBC # BLD AUTO: 3.7 10E6/UL (ref 3.8–5.2)
RBC URINE: 94 /HPF
SODIUM SERPL-SCNC: 136 MMOL/L (ref 135–145)
SP GR UR STRIP: 1.01 (ref 1–1.03)
SQUAMOUS EPITHELIAL: 2 /HPF
TRANSITIONAL EPI: 1 /HPF
UROBILINOGEN UR STRIP-MCNC: NORMAL MG/DL
WBC # BLD AUTO: 17.9 10E3/UL (ref 4–11)
WBC CLUMPS #/AREA URNS HPF: PRESENT /HPF
WBC URINE: >182 /HPF
YEAST #/AREA URNS HPF: ABNORMAL /HPF

## 2024-06-28 PROCEDURE — 82374 ASSAY BLOOD CARBON DIOXIDE: CPT | Performed by: INTERNAL MEDICINE

## 2024-06-28 PROCEDURE — 36415 COLL VENOUS BLD VENIPUNCTURE: CPT | Performed by: INTERNAL MEDICINE

## 2024-06-28 PROCEDURE — 120N000002 HC R&B MED SURG/OB UMMC

## 2024-06-28 PROCEDURE — 250N000011 HC RX IP 250 OP 636: Performed by: FAMILY MEDICINE

## 2024-06-28 PROCEDURE — 81001 URINALYSIS AUTO W/SCOPE: CPT | Performed by: INTERNAL MEDICINE

## 2024-06-28 PROCEDURE — 87106 FUNGI IDENTIFICATION YEAST: CPT | Performed by: INTERNAL MEDICINE

## 2024-06-28 PROCEDURE — 85610 PROTHROMBIN TIME: CPT | Performed by: INTERNAL MEDICINE

## 2024-06-28 PROCEDURE — 87086 URINE CULTURE/COLONY COUNT: CPT | Performed by: INTERNAL MEDICINE

## 2024-06-28 PROCEDURE — 86140 C-REACTIVE PROTEIN: CPT | Performed by: INTERNAL MEDICINE

## 2024-06-28 PROCEDURE — 99232 SBSQ HOSP IP/OBS MODERATE 35: CPT | Performed by: INTERNAL MEDICINE

## 2024-06-28 PROCEDURE — 250N000013 HC RX MED GY IP 250 OP 250 PS 637: Performed by: INTERNAL MEDICINE

## 2024-06-28 PROCEDURE — 250N000013 HC RX MED GY IP 250 OP 250 PS 637: Performed by: FAMILY MEDICINE

## 2024-06-28 PROCEDURE — 85025 COMPLETE CBC W/AUTO DIFF WBC: CPT | Performed by: INTERNAL MEDICINE

## 2024-06-28 PROCEDURE — 250N000013 HC RX MED GY IP 250 OP 250 PS 637: Performed by: STUDENT IN AN ORGANIZED HEALTH CARE EDUCATION/TRAINING PROGRAM

## 2024-06-28 PROCEDURE — 82565 ASSAY OF CREATININE: CPT | Performed by: INTERNAL MEDICINE

## 2024-06-28 RX ORDER — WARFARIN SODIUM 5 MG/1
5 TABLET ORAL
Status: DISCONTINUED | OUTPATIENT
Start: 2024-06-28 | End: 2024-06-28

## 2024-06-28 RX ORDER — WARFARIN SODIUM 7.5 MG/1
7.5 TABLET ORAL
Status: COMPLETED | OUTPATIENT
Start: 2024-06-28 | End: 2024-06-28

## 2024-06-28 RX ADMIN — MICONAZOLE NITRATE: 20 POWDER TOPICAL at 09:28

## 2024-06-28 RX ADMIN — METHOCARBAMOL 500 MG: 500 TABLET ORAL at 01:37

## 2024-06-28 RX ADMIN — DOXYCYCLINE HYCLATE 100 MG: 100 CAPSULE ORAL at 20:08

## 2024-06-28 RX ADMIN — ONDANSETRON 4 MG: 4 TABLET, ORALLY DISINTEGRATING ORAL at 17:47

## 2024-06-28 RX ADMIN — WARFARIN SODIUM 7.5 MG: 7.5 TABLET ORAL at 18:19

## 2024-06-28 RX ADMIN — ONDANSETRON 4 MG: 4 TABLET, ORALLY DISINTEGRATING ORAL at 08:06

## 2024-06-28 RX ADMIN — INSULIN GLARGINE 10 UNITS: 100 INJECTION, SOLUTION SUBCUTANEOUS at 22:18

## 2024-06-28 RX ADMIN — PRAVASTATIN SODIUM 40 MG: 10 TABLET ORAL at 22:18

## 2024-06-28 RX ADMIN — Medication 1 LOZENGE: at 08:07

## 2024-06-28 RX ADMIN — METOPROLOL SUCCINATE 25 MG: 25 TABLET, FILM COATED, EXTENDED RELEASE ORAL at 09:25

## 2024-06-28 RX ADMIN — DOXYCYCLINE HYCLATE 100 MG: 100 CAPSULE ORAL at 09:25

## 2024-06-28 RX ADMIN — AMLODIPINE BESYLATE 5 MG: 5 TABLET ORAL at 09:25

## 2024-06-28 ASSESSMENT — ACTIVITIES OF DAILY LIVING (ADL)
ADLS_ACUITY_SCORE: 43
ADLS_ACUITY_SCORE: 48
ADLS_ACUITY_SCORE: 45
ADLS_ACUITY_SCORE: 44
ADLS_ACUITY_SCORE: 48
ADLS_ACUITY_SCORE: 45
ADLS_ACUITY_SCORE: 48
ADLS_ACUITY_SCORE: 45
ADLS_ACUITY_SCORE: 43
ADLS_ACUITY_SCORE: 48
ADLS_ACUITY_SCORE: 43
ADLS_ACUITY_SCORE: 45
ADLS_ACUITY_SCORE: 48
ADLS_ACUITY_SCORE: 43
ADLS_ACUITY_SCORE: 45
ADLS_ACUITY_SCORE: 48
ADLS_ACUITY_SCORE: 45

## 2024-06-28 NOTE — PROVIDER NOTIFICATION
Paged Dr. Arshad: Can you put in orders for schilling catheter irrigation? Pt c/o bladder pain and fullness and states when this happens she gets irrigated. Bladder scan was 775mL. Thanks

## 2024-06-28 NOTE — TELEPHONE ENCOUNTER
Consult received via Phone from [facility] Plastic Team  for wound of the Lower leg.    Does the patient have an open and draining wound? Yes    Please schedule with Krysta Nuñez PA-C, Blessing Sánchez NP, Maciej Licea M.D., Tay Dc, YESSI, or Oli Wiggins M.D. at [facility] [place] for next available appointment.    **For all providers, Yakelin Toney PA-C, Dr. Guerra, Blessing Sánchez NP or Dr. Wiggins, please schedule a follow up 4 weeks after initial appointment.**  --If unable to schedule within 2-3 weeks then please place on cancellation list--    Is the patient able to make their own medical decisions? Yes    Can the patient be scheduled on a Wednesday (Feliciano) or Thursday (Princess)? Yes    Is patient a SANDRA lift? PLEASE INQUIRE WHEN MAKING THE APPOINTMENT AND PUT IN APPOINTMENT NOTES    Routing to  Wound Healing Scheduling.

## 2024-06-28 NOTE — PLAN OF CARE
Goal Outcome Evaluation:      Plan of Care Reviewed With: patient    Overall Patient Progress: improvingOverall Patient Progress: improving    Outcome Evaluation: Patient VSS, denies SOB and chest pain. Marked redness on RLE is subsiding/improving. Pain well controlled by PRNs. + 1 trace edema on right ankle. BLE elevated with pillows. Rash/redness on abdominal folds and groin area. Micatin powder applied with improvement. Up with assist of 1 GB and walker. RLE dressing, CDI. Regular diet and BG check. Pt has R & L PIV both saline locked. Has patent suprapubic catheter with adequate output. Plan to discharge to [facility] in [place].

## 2024-06-28 NOTE — PROGRESS NOTES
"[facility]    Medicine Progress Note - Hospitalist Service, GOLD TEAM 18    Date of Admission:  6/22/2024    Assessment & Plan   Francy Sherman is an 83 yo female w/ h/o T2DM, chronic afib on warfarin, HTN, HLD, hypertrophic lichen planus ( newly diagnosed ) with squamous cell ca.  She was initially admitted to [facility] 6/14/2024 with right leg cellulitis.  MRI on 6/15 showed superficial soft tissue edema, repeat MRI on 6/21 discovered new fluid collection and possible anterior tibialis tenosynovitis despite being on IV ceftriaxone and IV vancomycin.  Wound culture 6/19 grew MRSA.  Transferred to [facility] on 6/22/24 for orthopedic surgery consultation.  Per Dr Batres, \" Dr. Mays from orthopaedics discussed pt's case with [place] [facility] team and patient accepted to [facility] for medicine admission with orthopaedic surgery consultation\"    Right shin cellulitis w/ abscess  ---   Right lower extremity skin cancer extraction ~ 6/4/2024, path revealed hypertrophic lichen planus with squamous cell carcinoma with unclear margins.   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  ---   Wound culture 6/19 grew MRSA.  ---   ---92.9---104--->168 --->177.21 ---> 65.27 ---> 51.76  ---   WBC 17.0 ---> ---> ---> ---> ---> 15.2 ---> 12.9 ---> 11.7 ---> 12.3 ---> 17.9  ---   S/p I&D right shin abscess 6/26, intra-op cx grew 3+ MRSA, 2+ GPC and 2+ wbc  ---   S/p IV Vancomycin 6/13 - 6/25  ---   S/p IV Zosyn since 6/22 - 6/25  ---   S/p ceftriaxone added 6/18 - 6/22  ---   Continue Levaquin 750 mg po q48 hr, started on 6/25  ---   Continue Doxycycline 100 mg po bid, " started on 6/25  ---   Wound care per plastic surgery  ---   ID consult service following  ---   Orthopedics consult service signed off    Chronic atrial fibrillation  ---   PAF 55% in 9/2019  ---   in NSR during this eval  ---   Continue metoprolol for rate control  ---   [id number] score at least 5   ---   Continue Coumadin for stroke prophylaxis  ---   INR 1.56 today    HTN  ---   Continue home Norvasc and metoprolol with holding parameters .     Acute renal insuffiency  on CKD3  ---   Baseline Cr ~ 1.2 - 1.5  ---   Admit Cr was 1.62 ---> 1.26 ---> ---> ---> 1.79 ---> ---> 1.30  today  ---   Avoid nephrotoxins     T2DM  ---   Hgba1 was 7.0% on 6/14/24  ---   Fasting glucose 165 today  ---   Glipizide on hold since admit  ---   Continue home Lantus 10 units at bedtime  ---   Continue Novolog sliding scale insulin      HLD  ---   Continue home Pravachol.     Mild cognitive impairment  ---   Family notes that patient has been a bit more confused than her usual baseline since admission.  ---   May be related to oxycodone.  ---   OT consulted for slums assessment.    Urinary retention  ---   Etiology unclear  ---   PVR was 775 ml  ---   Pt c/o bladder pain and fullness  ---   Check UA  ---   Inserted schilling cath today            Diet: Snacks/Supplements Adult: Glucerna; With Meals  Regular Diet Adult    DVT Prophylaxis: Warfarin  Schilling Catheter: PRESENT, indication: Acute retention or obstruction  Lines: None     Cardiac Monitoring: None  Code Status: No CPR- Do NOT Intubate    Disposition Plan    Per Ortho rec              Joe Arshad MD  Hospitalist Service, GOLD TEAM 18  [facility]  Securely message with [employer] (more info)  Text page via [employer] Paging/Directory   See signed in provider for up to date coverage information  ______________________________________________________________________    Interval History   Right leg pain better today  She developed bladder pain  and fullness  PVR 7 7 5 mL  Denied dysuria    Physical Exam   Vital Signs: Temp: 98.3  F (36.8  C) Temp src: Oral BP: (!) 157/51 Pulse: 77   Resp: 16 SpO2: 98 % O2 Device: None (Room air)    Weight: 142 lbs 10.2 oz  General appearence: awake alert  in  no apparent distress  RESPIRATORY: lungs clear    CARDIOVASCULAR:S1 S2 regular rate and rhythm, no rubs gallops or murmurs appreciated  GASTROINTESTINAL:soft, non-distended , non-tender , + bowel sounds,   SKIN: warm and dry, no mottling noted   NEUROLOGIC; awake alert and oriented, no focal deficits found  EXTREMITIES: no clubbing, cyanosis or edema, able to wigged toes in right, no calf tenderness , calf soft , see images of wound              Data     I have personally reviewed the following data over the past 24 hrs:    17.9 (H)  \   11.2 (L)   / 348     136 99 30.3 (H) /  165 (H)   4.4 23 1.30 (H) \     Procal: N/A CRP: 51.78 (H) Lactic Acid: N/A       INR:  1.56 (H) PTT:  N/A   D-dimer:  N/A Fibrinogen:  N/A       Imaging results reviewed over the past 24 hrs:   No results found for this or any previous visit (from the past 24 hour(s)).

## 2024-06-28 NOTE — PLAN OF CARE
Goal Outcome Evaluation:      Plan of Care Reviewed With: patient            VS: BP (!) 157/51   Pulse 77   Temp 98.3  F (36.8  C) (Oral)   Resp 16   Wt 64.7 kg (142 lb 10.2 oz)   SpO2 98%   BMI 23.74 kg/m       O2: Stable on room air   Output: Chronic schilling catheter. Up to bedside commode.   -Schilling catheter to remain in place today.    Last BM: 6/26; active bowel sounds   Activity: Ax1 with gait belt, walker   Skin: Scattered bruises, rash to groin and abdominal skin folds, RLE surgical incision   Pain: C/o mild pain to RLE managed with rest and PRN medications   CMS: intact   Dressing: R knee dressing changed, CDI. Needs to be changed again tonight.    Diet: Regular diet, thin liquids, takes pills whole   LDA: R PIV saline locked, patent   Equipment: IV pole, wound care supplies, personal belongings   Plan: Continue plan of care   Additional Info: A&Ox4. C/o nausea this morning and evening but resolved with PRN zofran. Denies headache, chest pain, SOB, numbness or tingling. Uses call light appropriately and makes needs known.

## 2024-06-28 NOTE — TELEPHONE ENCOUNTER
Left message to call and schedule appointment, patient hospitalized, will call when she is released and ready to schedule

## 2024-06-29 ENCOUNTER — APPOINTMENT (OUTPATIENT)
Dept: PHYSICAL THERAPY | Facility: CLINIC | Age: 84
DRG: 623 | End: 2024-06-29
Attending: STUDENT IN AN ORGANIZED HEALTH CARE EDUCATION/TRAINING PROGRAM
Payer: COMMERCIAL

## 2024-06-29 LAB
ANION GAP SERPL CALCULATED.3IONS-SCNC: 13 MMOL/L (ref 7–15)
BACTERIA UR CULT: ABNORMAL
BASOPHILS # BLD AUTO: 0.1 10E3/UL (ref 0–0.2)
BASOPHILS NFR BLD AUTO: 1 %
BUN SERPL-MCNC: 39.5 MG/DL (ref 8–23)
CALCIUM SERPL-MCNC: 9.9 MG/DL (ref 8.8–10.2)
CHLORIDE SERPL-SCNC: 102 MMOL/L (ref 98–107)
CREAT SERPL-MCNC: 1.41 MG/DL (ref 0.51–0.95)
DEPRECATED HCO3 PLAS-SCNC: 24 MMOL/L (ref 22–29)
EGFRCR SERPLBLD CKD-EPI 2021: 37 ML/MIN/1.73M2
EOSINOPHIL # BLD AUTO: 0.2 10E3/UL (ref 0–0.7)
EOSINOPHIL NFR BLD AUTO: 1 %
ERYTHROCYTE [DISTWIDTH] IN BLOOD BY AUTOMATED COUNT: 12.3 % (ref 10–15)
GLUCOSE BLDC GLUCOMTR-MCNC: 122 MG/DL (ref 70–99)
GLUCOSE BLDC GLUCOMTR-MCNC: 124 MG/DL (ref 70–99)
GLUCOSE BLDC GLUCOMTR-MCNC: 132 MG/DL (ref 70–99)
GLUCOSE BLDC GLUCOMTR-MCNC: 144 MG/DL (ref 70–99)
GLUCOSE BLDC GLUCOMTR-MCNC: 168 MG/DL (ref 70–99)
GLUCOSE SERPL-MCNC: 143 MG/DL (ref 70–99)
HCT VFR BLD AUTO: 31.8 % (ref 35–47)
HGB BLD-MCNC: 10.4 G/DL (ref 11.7–15.7)
IMM GRANULOCYTES # BLD: 0.1 10E3/UL
IMM GRANULOCYTES NFR BLD: 1 %
INR PPP: 1.55 (ref 0.85–1.15)
LYMPHOCYTES # BLD AUTO: 1.8 10E3/UL (ref 0.8–5.3)
LYMPHOCYTES NFR BLD AUTO: 11 %
MCH RBC QN AUTO: 30 PG (ref 26.5–33)
MCHC RBC AUTO-ENTMCNC: 32.7 G/DL (ref 31.5–36.5)
MCV RBC AUTO: 92 FL (ref 78–100)
MONOCYTES # BLD AUTO: 1.5 10E3/UL (ref 0–1.3)
MONOCYTES NFR BLD AUTO: 9 %
NEUTROPHILS # BLD AUTO: 12.4 10E3/UL (ref 1.6–8.3)
NEUTROPHILS NFR BLD AUTO: 77 %
NRBC # BLD AUTO: 0 10E3/UL
NRBC BLD AUTO-RTO: 0 /100
PLATELET # BLD AUTO: 347 10E3/UL (ref 150–450)
POTASSIUM SERPL-SCNC: 4.3 MMOL/L (ref 3.4–5.3)
RBC # BLD AUTO: 3.47 10E6/UL (ref 3.8–5.2)
SODIUM SERPL-SCNC: 139 MMOL/L (ref 135–145)
WBC # BLD AUTO: 16.1 10E3/UL (ref 4–11)

## 2024-06-29 PROCEDURE — 250N000011 HC RX IP 250 OP 636: Performed by: FAMILY MEDICINE

## 2024-06-29 PROCEDURE — 250N000013 HC RX MED GY IP 250 OP 250 PS 637: Performed by: FAMILY MEDICINE

## 2024-06-29 PROCEDURE — 85025 COMPLETE CBC W/AUTO DIFF WBC: CPT | Performed by: INTERNAL MEDICINE

## 2024-06-29 PROCEDURE — 80048 BASIC METABOLIC PNL TOTAL CA: CPT | Performed by: INTERNAL MEDICINE

## 2024-06-29 PROCEDURE — 85610 PROTHROMBIN TIME: CPT | Performed by: INTERNAL MEDICINE

## 2024-06-29 PROCEDURE — 97161 PT EVAL LOW COMPLEX 20 MIN: CPT | Mod: GP

## 2024-06-29 PROCEDURE — 99232 SBSQ HOSP IP/OBS MODERATE 35: CPT | Performed by: INTERNAL MEDICINE

## 2024-06-29 PROCEDURE — 250N000013 HC RX MED GY IP 250 OP 250 PS 637: Performed by: STUDENT IN AN ORGANIZED HEALTH CARE EDUCATION/TRAINING PROGRAM

## 2024-06-29 PROCEDURE — 97116 GAIT TRAINING THERAPY: CPT | Mod: GP

## 2024-06-29 PROCEDURE — 36415 COLL VENOUS BLD VENIPUNCTURE: CPT | Performed by: INTERNAL MEDICINE

## 2024-06-29 PROCEDURE — 97530 THERAPEUTIC ACTIVITIES: CPT | Mod: GP

## 2024-06-29 PROCEDURE — 120N000002 HC R&B MED SURG/OB UMMC

## 2024-06-29 PROCEDURE — 250N000013 HC RX MED GY IP 250 OP 250 PS 637: Performed by: INTERNAL MEDICINE

## 2024-06-29 RX ORDER — WARFARIN SODIUM 5 MG/1
5 TABLET ORAL
Status: COMPLETED | OUTPATIENT
Start: 2024-06-29 | End: 2024-06-29

## 2024-06-29 RX ADMIN — METHOCARBAMOL 500 MG: 500 TABLET ORAL at 21:42

## 2024-06-29 RX ADMIN — MICONAZOLE NITRATE: 20 POWDER TOPICAL at 09:16

## 2024-06-29 RX ADMIN — DOXYCYCLINE HYCLATE 100 MG: 100 CAPSULE ORAL at 09:13

## 2024-06-29 RX ADMIN — AMLODIPINE BESYLATE 5 MG: 5 TABLET ORAL at 09:13

## 2024-06-29 RX ADMIN — INSULIN ASPART 1 UNITS: 100 INJECTION, SOLUTION INTRAVENOUS; SUBCUTANEOUS at 22:42

## 2024-06-29 RX ADMIN — PRAVASTATIN SODIUM 40 MG: 10 TABLET ORAL at 21:33

## 2024-06-29 RX ADMIN — INSULIN GLARGINE 10 UNITS: 100 INJECTION, SOLUTION SUBCUTANEOUS at 22:42

## 2024-06-29 RX ADMIN — SENNOSIDES AND DOCUSATE SODIUM 2 TABLET: 50; 8.6 TABLET ORAL at 10:53

## 2024-06-29 RX ADMIN — DOXYCYCLINE HYCLATE 100 MG: 100 CAPSULE ORAL at 21:33

## 2024-06-29 RX ADMIN — LEVOFLOXACIN 750 MG: 250 TABLET, FILM COATED ORAL at 09:13

## 2024-06-29 RX ADMIN — OXYCODONE HYDROCHLORIDE 5 MG: 5 TABLET ORAL at 02:19

## 2024-06-29 RX ADMIN — MICONAZOLE NITRATE: 20 POWDER TOPICAL at 21:37

## 2024-06-29 RX ADMIN — ONDANSETRON 4 MG: 4 TABLET, ORALLY DISINTEGRATING ORAL at 10:55

## 2024-06-29 RX ADMIN — WARFARIN SODIUM 5 MG: 5 TABLET ORAL at 17:59

## 2024-06-29 RX ADMIN — OXYCODONE HYDROCHLORIDE 5 MG: 5 TABLET ORAL at 18:03

## 2024-06-29 RX ADMIN — METOPROLOL SUCCINATE 25 MG: 25 TABLET, FILM COATED, EXTENDED RELEASE ORAL at 09:13

## 2024-06-29 ASSESSMENT — ACTIVITIES OF DAILY LIVING (ADL)
ADLS_ACUITY_SCORE: 44

## 2024-06-29 NOTE — PROGRESS NOTES
A/Ox's 4. Pt rated pain as tolerable. Oxycodone given for pain control. Dressing Change per plan of care and now CDI. CMS to baseline. Tolerated regular diet. Bg Checks done. Denied any nausea, CP, SOB, lightheadedness or dizziness. Pt has a schilling. Resting in bed at this time with call light in reach. Able to make needs known.

## 2024-06-29 NOTE — PROGRESS NOTES
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  She was initially admitted to [facility] 6/14/2024 with right leg cellulitis.  MRI on 6/15 showed superficial soft tissue edema, repeat MRI on 6/21 discovered new fluid collection and possible anterior tibialis tenosynovitis despite being on IV ceftriaxone and IV vancomycin.  Wound culture 6/19 grew MRSA.  Transferred to [facility] on 6/22/24 for orthopedic surgery consultation.  Per Dr Batres, \" Dr. Mays from orthopaedics discussed pt's case with [facility] team and patient accepted to [facility] for medicine admission with orthopaedic surgery consultation\"    Right shin cellulitis w/ abscess  ---   Right lower extremity skin cancer extraction ~ 6/4/2024, path revealed hypertrophic lichen planus with squamous cell carcinoma with unclear margins.   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  ---   Wound culture 6/19 grew MRSA.  ---   ---92.9---104--->168 --->177.21 ---> 65.27 ---> 51.76  ---   WBC 17.0 ---> ---> ---> ---> ---> 15.2 ---> 12.9 ---> 11.7 ---> 12.3 ---> 17.9 ---> 16.1  ---   S/p I&D right shin abscess 6/26, intra-op cx grew 3+ MRSA, 2+ GPC and 2+ wbc  ---   S/p IV Vancomycin 6/13 - 6/25  ---   S/p IV Zosyn since 6/22 - 6/25  ---   S/p ceftriaxone added 6/18 - 6/22  ---   Continue Levaquin 750 mg po q48 hr, started on 6/25  ---   Continue Doxycycline 100 " mg po bid, started on 6/25  ---   Wound care per plastic surgery  ---   ID consult service following  ---   Orthopedics consult service signed off    Urinary retention, 6/28  Urinary tract infection, 6/28  ---   Etiology unclear  ---   PVR was 775 ml on 6/28  ---   Pt c/o bladder pain and fullness on 6/28  ---   UA 6/28 with small blood, large leukocyte Estrace, negative nitrite, > 182 WBC, 94 RBC, WBC clumps present, many yeast present  ---   UCx   ---   Inserted schilling cath on 6/28  ---   UTI covered with levofloxacin    Chronic atrial fibrillation  ---   PAF 55% in 9/2019  ---   in NSR during this eval  ---   Continue metoprolol for rate control  ---   [id number] score at least 5   ---   Continue Coumadin for stroke prophylaxis  ---   INR 1.56 today    HTN  ---   Continue home Norvasc and metoprolol with holding parameters .     Acute renal insuffiency  on CKD3  ---   Baseline Cr ~ 1.2 - 1.5  ---   Admit Cr was 1.62 ---> 1.26 ---> ---> ---> 1.79 ---> ---> 1.30 ---> 1.41  today  ---   Avoid nephrotoxins     T2DM  ---   Hgba1 was 7.0% on 6/14/24  ---   Fasting glucose 124 today  ---   Glipizide on hold since admit  ---   Continue home Lantus 10 units at bedtime  ---   Continue Novolog sliding scale insulin      HLD  ---   Continue home Pravachol.     Mild cognitive impairment  ---   Family notes that patient has been a bit more confused than her usual baseline since admission.  ---   May be related to oxycodone.  ---   OT consulted for slums assessment.    Anemia  ---   Likely due to chronic disease  ---   Hgb stable at 10 -11 g          Diet: Snacks/Supplements Adult: Glucerna; With Meals  Regular Diet Adult    DVT Prophylaxis: Warfarin  Schilling Catheter: PRESENT, indication: Acute retention or obstruction  Lines: None     Cardiac Monitoring: None  Code Status: No CPR- Do NOT Intubate    Disposition Plan    Per Ortho rec              Joe Arshad MD  Hospitalist Service, GOLD TEAM 18  M [facility]  [facility]  Securely message with [employer] (more info)  Text page via [employer] Paging/Directory   See signed in provider for up to date coverage information  ______________________________________________________________________    Interval History   No complaints  Uneventful night  Villalobos catheter inserted yesterday  Right shin pain better today  Remains weak and requires assist for transfer.  Ambulates with a walker and standby    Physical Exam   Vital Signs: Temp: 98.8  F (37.1  C) Temp src: Oral BP: (!) 139/93 (post exercise) Pulse: 79   Resp: 16 SpO2: 92 % O2 Device: None (Room air)    Weight: 142 lbs 10.2 oz  General appearence: awake alert  in  no apparent distress  RESPIRATORY: lungs clear    CARDIOVASCULAR:S1 S2 regular rate and rhythm, no rubs gallops or murmurs appreciated  GASTROINTESTINAL:soft, non-distended , non-tender , + bowel sounds,   SKIN: warm and dry, no mottling noted   NEUROLOGIC; awake alert and oriented, no focal deficits found  EXTREMITIES: no clubbing, cyanosis or edema, able to wigged toes in right, no calf tenderness , calf soft , see images of wound              Data     I have personally reviewed the following data over the past 24 hrs:    16.1 (H)  \   10.4 (L)   / 347     139 102 39.5 (H) /  122 (H)   4.3 24 1.41 (H) \     INR:  1.55 (H) PTT:  N/A   D-dimer:  N/A Fibrinogen:  N/A       Imaging results reviewed over the past 24 hrs:   No results found for this or any previous visit (from the past 24 hour(s)).

## 2024-06-29 NOTE — PROGRESS NOTES
"   06/29/24 0832   Appointment Info   Signing Clinician's Name / Credentials (PT) Jennifer Garcia DPT   Rehab Comments (PT) WBAT   Living Environment   People in Home spouse  ( has some health issues himself; pt is his primary caretaker. Unable to physically assist)   Current Living Arrangements house   Home Accessibility stairs to enter home;stairs within home   Number of Stairs, Main Entrance 1   Stair Railings, Main Entrance railings safe and in good condition   Number of Stairs, Within Home, Primary greater than 10 stairs   Stair Railings, Within Home, Primary railings on both sides of stairs   Transportation Anticipated family or friend will provide   Living Environment Comments Basement stairs with rails on both sides, wouldnt have to navigate these stairs right away.   Self-Care   Usual Activity Tolerance good   Current Activity Tolerance moderate   Equipment Currently Used at Home cane, straight;walker, rolling  (Has access to FWW at home)   Fall history within last six months yes   Number of times patient has fallen within last six months 2  (Fell at home, reports having less strength and feeling weak caused the fall)   Activity/Exercise/Self-Care Comment Has a SPC, only uses intermittently.  has FWW and 4WW which she has access too if needed. Reports being IND with i/ADLs. Able to handle assisting her  and completing cooking/cleaning.   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  She was initially admitted to [facility] 6/14/2024 with right leg cellulitis.  MRI on 6/15 " "showed superficial soft tissue edema, repeat MRI on 6/21 discovered new fluid collection and possible anterior tibialis tenosynovitis despite being on IV ceftriaxone and IV vancomycin.  Wound culture 6/19 grew MRSA.  Transferred to [facility] on 6/22/24 for orthopedic surgery consultation.  Per Dr Batres, \" Dr. Mays from orthopaedics discussed pt's case with [facility] team and patient accepted to [facility] for medicine admission with orthopaedic surgery consultation\"   Existing Precautions/Restrictions fall   Weight-Bearing Status - LLE full weight-bearing   Weight-Bearing Status - RLE weight-bearing as tolerated   General Observations Pt supine in bed, with Wilfredo valle. Anxious at baseline about mobility, stating \"I just don't trust my legs\"   Cognition   Affect/Mental Status (Cognition) WNL   Orientation Status (Cognition) oriented x 4   Follows Commands (Cognition) WNL   Cognitive Status Comments Communicates appropriately throughout. Expressing anxiety/concerns about \"not trusting\" her legs   Pain Assessment   Patient Currently in Pain No   Integumentary/Edema   Integumentary/Edema Comments Wound on RLE covered, dry skin noted on BLEs   Posture    Posture Forward head position;Protracted shoulders   Range of Motion (ROM)   ROM Comment Decreased active R DF ROM noted   Strength (Manual Muscle Testing)   Strength (Manual Muscle Testing) strength is WNL   Strength Comments Decreased R DF strength   Bed Mobility   Comment, (Bed Mobility) Supine>sit with ROBERT from flat bed, increased use of bed rails   Transfers   Comment, (Transfers) STS from EOB with CGA and FWW, pt maintaining RLE extended in front of her during STS transfer. Increased reliance of UE to push into standing, demo poor ability to achieve upright posture   Gait/Stairs (Locomotion)   Comment, (Gait/Stairs) Pt ambulated 10ft with FWW and CGA. Demo decreased WB on RLE, decreased gait speed, decreased active DF ROM on R, decreased step " length and forward flexion of trunk   Balance   Balance Comments Static sitting balance at EOB good with UE support. Static standing balance fair with FWW and CGA; pt not fully WB on RLE (denies pain, endorses concern RLE cannot support her). Dyanmic balance fair with increased relianace on FWW for support and CGA.   Clinical Impression   Criteria for Skilled Therapeutic Intervention Yes, treatment indicated   PT Diagnosis (PT) impaired mobility   Influenced by the following impairments generalized weakness, decreased activity tolerance, anxiety, decreased R DF AROM   Functional limitations due to impairments bed mobility, transfers, ambulation, stairs   Clinical Presentation (PT Evaluation Complexity) stable   Clinical Presentation Rationale per clinical judgement   Clinical Decision Making (Complexity) low complexity   Planned Therapy Interventions (PT) balance training;bed mobility training;gait training;home exercise program;patient/family education;ROM (range of motion);stair training;strengthening;stretching;transfer training;neuromuscular re-education;progressive activity/exercise;risk factor education   Risk & Benefits of therapy have been explained evaluation/treatment results reviewed;care plan/treatment goals reviewed;risks/benefits reviewed;current/potential barriers reviewed;participants voiced agreement with care plan   Clinical Impression Comments Pt present below functional baseline for mobility. Pt expressing increased anxiety regaurding her mobility, despite increased education and encouragement on WBAT on RLE. Pt requires Ax1 for STS and ambulation, demo decreased tolerance for activity and hesistency for WB on RLE.   PT Total Evaluation Time   PT Ai, Low Complexity Minutes (12295) 12   Physical Therapy Goals   PT Frequency 5x/week   PT Predicted Duration/Target Date for Goal Attainment 07/05/24   PT Goals Bed Mobility;Transfers;Stairs;Gait   PT: Bed Mobility Independent;Supine to/from sit   PT:  Transfers Modified independent;Sit to/from stand;Assistive device   PT: Gait Modified independent;Rolling walker;150 feet   PT: Stairs Modified independent;1 stair;Rail on both sides   Therapeutic Activity   Therapeutic Activities: dynamic activities to improve functional performance Minutes (04140) 18   Symptoms Noted During/After Treatment Fatigue;Dizziness   Treatment Detail/Skilled Intervention Sitting at EOB, pt reporting dizziness. BP stable, documented in vitals flowsheet. Increased time sitting at EOB; dizziness remains per pt report but pt willing to progress mobility. Multiple STS from EOB with FWW and CGA; requires increased time to achieve upright posture. Pt extends RLE in front of her to avoid WB - education provided on WBAT status and time spent providing education on proper foot placement to assist with standing. Benefits from increased time for transitions. Education provided on safety with transfers; practice stand pivot transfer from EOB<>high back chair with CGA. Pt demo small steps during pivot transfer and increased trunk flexion/reliance on FWW; does not respond to v/c for standing up tall. Return to supine with ROBERT and use of bed rails. Assist with placing pillows under RLE to elevate. Education provided on ankle pumps to continue to promote DF strengthening and blood circulation. Ended in supine with bed alarm on and VSS stable.   Gait Training   Gait Training Minutes (59389) 8   Symptoms Noted During/After Treatment (Gait Training) fatigue   Treatment Detail/Skilled Intervention Pt hesistent to trial ambulation. Starting with lateral wt shifting in place, pt demo decreased WB on RLE, increased education on WBAT status and strength in RLE to support pt. Standing marches x10 reps alternating, decreased foot clearance bilat, decreased stance time on R. Ambulated ~40ft in room with FWW and CGA, demo decreased gait speed, decreased stance time on R, decreased step length and increased trunk  flexion/UE reliance on FWW. V/c for sequecing turns with FWW, and education provided on use of FWW for stability and safety with ambulation.   PT Discharge Planning   PT Plan pivot transfers, ambulation tolerance, step ups   PT Discharge Recommendation (DC Rec) Transitional Care Facility   PT Rationale for DC Rec Pt presents below baseline, provides assist for her elderly  as well. Demo Ax1 for transfers and decreased tolerance for ambulation. Would benefit from TCU prior to returning home in order to increase strength and overall activity tolerance   PT Brief overview of current status Ax1 with FWW   Total Session Time   Timed Code Treatment Minutes 26   Total Session Time (sum of timed and untimed services) 38

## 2024-06-29 NOTE — PLAN OF CARE
Goal Outcome Evaluation:      Plan of Care Reviewed With: patient    Overall Patient Progress: improvingOverall Patient Progress: improving       VS: BP (!) 139/93 (BP Location: Right arm, Patient Position: Sitting)   Pulse 79   Temp 98.8  F (37.1  C) (Oral)   Resp 16   Wt 64.7 kg (142 lb 10.2 oz)   SpO2 92%   BMI 23.74 kg/m       O2: SpO2 >90% on RA. Denies SOB/chest pain.    Output: Villalobos draining adequately    Last BM: 6/26 - PRN senna given    Activity: Asstx1 with walker and GB    Skin: R shin wound    Pain: Reported minimal pain - managed with rest    CMS: A&Ox4. Denies N/T.    Dressing: Dressing changed x1 this AM- will need to be changed again tonight per order.    Diet: Regular - low appetite. Zofran given for nausea. Pt had a protein shake and vanilla pudding.    LDA: R PIV SL   Equipment: Walker, personal belongings    Plan: Pending    Additional Info:

## 2024-06-30 ENCOUNTER — APPOINTMENT (OUTPATIENT)
Dept: PHYSICAL THERAPY | Facility: CLINIC | Age: 84
DRG: 623 | End: 2024-06-30
Attending: STUDENT IN AN ORGANIZED HEALTH CARE EDUCATION/TRAINING PROGRAM
Payer: COMMERCIAL

## 2024-06-30 LAB
ANION GAP SERPL CALCULATED.3IONS-SCNC: 12 MMOL/L (ref 7–15)
BASOPHILS # BLD AUTO: 0.1 10E3/UL (ref 0–0.2)
BASOPHILS NFR BLD AUTO: 1 %
BUN SERPL-MCNC: 41.7 MG/DL (ref 8–23)
CALCIUM SERPL-MCNC: 9.7 MG/DL (ref 8.8–10.2)
CHLORIDE SERPL-SCNC: 100 MMOL/L (ref 98–107)
CREAT SERPL-MCNC: 1.43 MG/DL (ref 0.51–0.95)
CRP SERPL-MCNC: 34.78 MG/L
DEPRECATED HCO3 PLAS-SCNC: 24 MMOL/L (ref 22–29)
EGFRCR SERPLBLD CKD-EPI 2021: 36 ML/MIN/1.73M2
EOSINOPHIL # BLD AUTO: 0.2 10E3/UL (ref 0–0.7)
EOSINOPHIL NFR BLD AUTO: 1 %
ERYTHROCYTE [DISTWIDTH] IN BLOOD BY AUTOMATED COUNT: 12.5 % (ref 10–15)
GLUCOSE BLDC GLUCOMTR-MCNC: 134 MG/DL (ref 70–99)
GLUCOSE BLDC GLUCOMTR-MCNC: 140 MG/DL (ref 70–99)
GLUCOSE BLDC GLUCOMTR-MCNC: 141 MG/DL (ref 70–99)
GLUCOSE BLDC GLUCOMTR-MCNC: 156 MG/DL (ref 70–99)
GLUCOSE BLDC GLUCOMTR-MCNC: 156 MG/DL (ref 70–99)
GLUCOSE BLDC GLUCOMTR-MCNC: 160 MG/DL (ref 70–99)
GLUCOSE SERPL-MCNC: 135 MG/DL (ref 70–99)
HCT VFR BLD AUTO: 33.1 % (ref 35–47)
HGB BLD-MCNC: 10.7 G/DL (ref 11.7–15.7)
IMM GRANULOCYTES # BLD: 0.1 10E3/UL
IMM GRANULOCYTES NFR BLD: 1 %
INR PPP: 1.67 (ref 0.85–1.15)
LYMPHOCYTES # BLD AUTO: 1.8 10E3/UL (ref 0.8–5.3)
LYMPHOCYTES NFR BLD AUTO: 12 %
MCH RBC QN AUTO: 30.4 PG (ref 26.5–33)
MCHC RBC AUTO-ENTMCNC: 32.3 G/DL (ref 31.5–36.5)
MCV RBC AUTO: 94 FL (ref 78–100)
MONOCYTES # BLD AUTO: 1.6 10E3/UL (ref 0–1.3)
MONOCYTES NFR BLD AUTO: 11 %
NEUTROPHILS # BLD AUTO: 11.4 10E3/UL (ref 1.6–8.3)
NEUTROPHILS NFR BLD AUTO: 74 %
NRBC # BLD AUTO: 0 10E3/UL
NRBC BLD AUTO-RTO: 0 /100
PLATELET # BLD AUTO: 329 10E3/UL (ref 150–450)
POTASSIUM SERPL-SCNC: 4.4 MMOL/L (ref 3.4–5.3)
RBC # BLD AUTO: 3.52 10E6/UL (ref 3.8–5.2)
SODIUM SERPL-SCNC: 136 MMOL/L (ref 135–145)
WBC # BLD AUTO: 15.2 10E3/UL (ref 4–11)

## 2024-06-30 PROCEDURE — 99232 SBSQ HOSP IP/OBS MODERATE 35: CPT | Performed by: INTERNAL MEDICINE

## 2024-06-30 PROCEDURE — 85025 COMPLETE CBC W/AUTO DIFF WBC: CPT | Performed by: INTERNAL MEDICINE

## 2024-06-30 PROCEDURE — 250N000013 HC RX MED GY IP 250 OP 250 PS 637: Performed by: STUDENT IN AN ORGANIZED HEALTH CARE EDUCATION/TRAINING PROGRAM

## 2024-06-30 PROCEDURE — 86140 C-REACTIVE PROTEIN: CPT | Performed by: INTERNAL MEDICINE

## 2024-06-30 PROCEDURE — 85610 PROTHROMBIN TIME: CPT | Performed by: INTERNAL MEDICINE

## 2024-06-30 PROCEDURE — 80048 BASIC METABOLIC PNL TOTAL CA: CPT | Performed by: INTERNAL MEDICINE

## 2024-06-30 PROCEDURE — 120N000002 HC R&B MED SURG/OB UMMC

## 2024-06-30 PROCEDURE — 97116 GAIT TRAINING THERAPY: CPT | Mod: GP

## 2024-06-30 PROCEDURE — 97530 THERAPEUTIC ACTIVITIES: CPT | Mod: GP

## 2024-06-30 PROCEDURE — 250N000013 HC RX MED GY IP 250 OP 250 PS 637: Performed by: FAMILY MEDICINE

## 2024-06-30 PROCEDURE — 36415 COLL VENOUS BLD VENIPUNCTURE: CPT | Performed by: INTERNAL MEDICINE

## 2024-06-30 PROCEDURE — 250N000013 HC RX MED GY IP 250 OP 250 PS 637: Performed by: INTERNAL MEDICINE

## 2024-06-30 RX ORDER — SENNOSIDES 8.6 MG
1-2 TABLET ORAL 2 TIMES DAILY PRN
Status: DISCONTINUED | OUTPATIENT
Start: 2024-06-30 | End: 2024-06-30

## 2024-06-30 RX ORDER — POLYETHYLENE GLYCOL 3350 17 G/17G
17 POWDER, FOR SOLUTION ORAL DAILY
Status: DISCONTINUED | OUTPATIENT
Start: 2024-06-30 | End: 2024-07-03 | Stop reason: HOSPADM

## 2024-06-30 RX ORDER — BISACODYL 10 MG
10 SUPPOSITORY, RECTAL RECTAL DAILY PRN
Status: DISCONTINUED | OUTPATIENT
Start: 2024-06-30 | End: 2024-07-03 | Stop reason: HOSPADM

## 2024-06-30 RX ORDER — WARFARIN SODIUM 7.5 MG/1
7.5 TABLET ORAL
Status: COMPLETED | OUTPATIENT
Start: 2024-06-30 | End: 2024-06-30

## 2024-06-30 RX ADMIN — OXYCODONE HYDROCHLORIDE 5 MG: 5 TABLET ORAL at 18:29

## 2024-06-30 RX ADMIN — ACETAMINOPHEN 650 MG: 325 TABLET, FILM COATED ORAL at 15:48

## 2024-06-30 RX ADMIN — DOXYCYCLINE HYCLATE 100 MG: 100 CAPSULE ORAL at 08:30

## 2024-06-30 RX ADMIN — METHOCARBAMOL 500 MG: 500 TABLET ORAL at 03:45

## 2024-06-30 RX ADMIN — METHOCARBAMOL 500 MG: 500 TABLET ORAL at 15:49

## 2024-06-30 RX ADMIN — WARFARIN SODIUM 7.5 MG: 7.5 TABLET ORAL at 18:29

## 2024-06-30 RX ADMIN — ACETAMINOPHEN 650 MG: 325 TABLET, FILM COATED ORAL at 03:45

## 2024-06-30 RX ADMIN — DOXYCYCLINE HYCLATE 100 MG: 100 CAPSULE ORAL at 20:43

## 2024-06-30 RX ADMIN — POLYETHYLENE GLYCOL 3350 17 G: 17 POWDER, FOR SOLUTION ORAL at 09:56

## 2024-06-30 RX ADMIN — ACETAMINOPHEN 650 MG: 325 TABLET, FILM COATED ORAL at 21:54

## 2024-06-30 RX ADMIN — MICONAZOLE NITRATE: 20 POWDER TOPICAL at 08:32

## 2024-06-30 RX ADMIN — METOPROLOL SUCCINATE 25 MG: 25 TABLET, FILM COATED, EXTENDED RELEASE ORAL at 08:30

## 2024-06-30 RX ADMIN — SENNOSIDES AND DOCUSATE SODIUM 2 TABLET: 50; 8.6 TABLET ORAL at 08:30

## 2024-06-30 RX ADMIN — PRAVASTATIN SODIUM 40 MG: 10 TABLET ORAL at 21:54

## 2024-06-30 RX ADMIN — INSULIN ASPART 1 UNITS: 100 INJECTION, SOLUTION INTRAVENOUS; SUBCUTANEOUS at 15:55

## 2024-06-30 RX ADMIN — OXYCODONE HYDROCHLORIDE 5 MG: 5 TABLET ORAL at 00:18

## 2024-06-30 RX ADMIN — INSULIN ASPART 1 UNITS: 100 INJECTION, SOLUTION INTRAVENOUS; SUBCUTANEOUS at 11:45

## 2024-06-30 RX ADMIN — AMLODIPINE BESYLATE 5 MG: 5 TABLET ORAL at 08:30

## 2024-06-30 RX ADMIN — MICONAZOLE NITRATE: 20 POWDER TOPICAL at 21:03

## 2024-06-30 RX ADMIN — INSULIN GLARGINE 10 UNITS: 100 INJECTION, SOLUTION SUBCUTANEOUS at 21:55

## 2024-06-30 ASSESSMENT — ACTIVITIES OF DAILY LIVING (ADL)
ADLS_ACUITY_SCORE: 44

## 2024-06-30 NOTE — PLAN OF CARE
VS: VSS, pt denied CP or SOB.   O2: Room air sat. > 90%.   Output: Villalobos in place with adequate out put.    Last BM: 06/26/24 passing gas.    Activity: Not out of bed this evening.    Skin: Intact except wound on R leg.    Pain: Comfortably manageable with PRN medication.    Neuro: CMS and neuro intact to baseline.    Dressing: CDI.    Diet: Regular tolerating okay.    LDA: PIV SL.    Equipment: Walker, IV pole and personal belongings.    Plan: TBD.    Additional Info:

## 2024-06-30 NOTE — PLAN OF CARE
Goal Outcome Evaluation: [id number].  PT A&O x4. Able to make needs known. On RA, denied N/T, SOB or CP. Pain 5/10 managed with Tylenol, Oxy, and Robaxin. Bladder scan 801ml, output 450 straight cath. Assist 1 to commode. Regular diet, BG monitored with insulin sliding scale. Plan of care continues.

## 2024-06-30 NOTE — PLAN OF CARE
Goal Outcome Evaluation:      Plan of Care Reviewed With: patient    Overall Patient Progress: improvingOverall Patient Progress: improving       VS: /49 (BP Location: Right arm)   Pulse 72   Temp 98.4  F (36.9  C) (Oral)   Resp 16   Wt 64.7 kg (142 lb 10.2 oz)   SpO2 94%   BMI 23.74 kg/m        O2: SpO2 >90% on RA. Denies SOB/chest pain.             Output:  Villalobos removed at 1010 for trial of void - bladder scan due at 1610. Pt denies any urge to pee or bladder discomfort at this time.    Last BM: 6/30   Activity: Asstx1 with walker and GB    Skin: R shin wound    Pain: Reported minimal pain - managed with rest    CMS: A&Ox4. Denies N/T.    Dressing: Dressing changed x1 this AM- ordered for BID. Pt requests second dressing change to be done earlier in janis ~8778-1812 as it causes pain and makes it hard to sleep.    Diet: Regular - low appetite.    LDA: R PIV SL   Equipment: Walker, personal belongings    Plan: Pending    Additional Info: Per medicine - do not need another urine culture specimen.

## 2024-06-30 NOTE — PROGRESS NOTES
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  She was initially admitted to [facility] 6/14/2024 with right leg cellulitis.  MRI on 6/15 showed superficial soft tissue edema, repeat MRI on 6/21 discovered new fluid collection and possible anterior tibialis tenosynovitis despite being on IV ceftriaxone and IV vancomycin.  Wound culture 6/19 grew MRSA.  Transferred to [facility] on 6/22/24 for orthopedic surgery consultation.  Per Dr Batres, \" Dr. Mays from orthopaedics discussed pt's case with [facility] team and patient accepted to [facility] for medicine admission with orthopaedic surgery consultation\"    Right shin cellulitis w/ abscess  ---   Right lower extremity skin cancer extraction ~ 6/4/2024, path revealed hypertrophic lichen planus with squamous cell carcinoma with unclear margins.   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  ---   Wound culture 6/19 grew MRSA.  ---   ---92.9---104--->168 --->177.21 ---> 65.27 ---> 51.76 ---> 34.78  ---   WBC 17.0 ---> ---> 15.2 ---> ---> 11.7 ---> 12.3 ---> 17.9 ---> 16.1 ---> 15.2  ---   S/p I&D right shin abscess 6/26, intra-op cx grew MRSA  ---   S/p IV Vancomycin 6/13 - 6/25  ---   S/p IV Zosyn since 6/22 - 6/25  ---   S/p ceftriaxone added 6/18 - 6/22  ---   Continue Levaquin 750 mg po q48 hr, started on 6/25  ---   Continue Doxycycline 100 mg po bid, started on " 6/25  ---   Wound care per plastic surgery  ---   ID consult service following  ---   Orthopedics consult service signed off    Urinary retention, 6/28  Urinary tract infection, 6/28  ---   Etiology unclear  ---   PVR was 775 ml on 6/28  ---   Pt c/o bladder pain and fullness on 6/28  ---   UA 6/28 with small blood, large leukocyte Estrace, negative nitrite, > 182 WBC, 94 RBC, WBC clumps present, many yeast present  ---   UCx grew 10-50 CFU/ml Candida Albicans  ---   Inserted schilling cath on 6/28  ---   Remove schilling today 6/30, then void trial  ---   No indications for antifungal meds    Chronic atrial fibrillation  ---   PAF 55% in 9/2019  ---   in NSR during this eval  ---   Continue metoprolol for rate control  ---   [id number] score at least 5   ---   Continue Coumadin for stroke prophylaxis  ---   INR 1.67 today    HTN  ---   Continue home Norvasc and metoprolol with holding parameters .     Acute renal insuffiency  on CKD3  ---   Baseline Cr ~ 1.2 - 1.5  ---   Admit Cr was 1.62 ---> 1.26 ---> ---> 1.79 ---> ---> 1.30 ---> 1.41 ---> 1.43  today  ---   Avoid nephrotoxins     T2DM  ---   Hgba1 was 7.0% on 6/14/24  ---   Fasting glucose 140 today  ---   Glipizide on hold since admit  ---   Continue home Lantus 10 units at bedtime  ---   Continue Novolog sliding scale insulin      HLD  ---   Continue home Pravachol.     Mild cognitive impairment  ---   Family notes that patient has been a bit more confused than her usual baseline since admission.  ---   May be related to oxycodone.  ---   Resolved    Anemia  ---   Likely due to chronic disease  ---   Hgb stable at 10 -11 g    Constipation  ---   Likely OIC  --- Patient has not had a BM in 4 days   ---   Start senna-S2 tab po bid prn, MiraLAX 17 g daily, Dulcolax suppository daily prn and Fleet enema daily prn          Diet: Snacks/Supplements Adult: Glucerna; With Meals  Regular Diet Adult    DVT Prophylaxis: Warfarin  Schilling Catheter: Not present  Lines: None      Cardiac Monitoring: None  Code Status: No CPR- Do NOT Intubate    Disposition Plan    Probable transfer to TCU tomorrow              Joe Arshad MD  Hospitalist Service, GOLD TEAM 18  M [facility]  Securely message with [employer] (more info)  Text page via [employer] Paging/Directory   See signed in provider for up to date coverage information  ______________________________________________________________________    Interval History   No complaints  Uneventful night  Villalobos cath to be removed today     Physical Exam   Vital Signs: Temp: 98.4  F (36.9  C) Temp src: Oral BP: 115/49 Pulse: 72   Resp: 16 SpO2: 94 % O2 Device: None (Room air)    Weight: 142 lbs 10.2 oz  General appearence: awake alert  in  no apparent distress  RESPIRATORY: lungs clear    CARDIOVASCULAR:S1 S2 regular rate and rhythm, no rubs gallops or murmurs appreciated  GASTROINTESTINAL:soft, non-distended , non-tender , + bowel sounds,   SKIN: warm and dry, no mottling noted   NEUROLOGIC; awake alert and oriented, no focal deficits found  EXTREMITIES: no clubbing, cyanosis or edema, able to wigged toes in right, no calf tenderness , calf soft , see images of wound              Data     I have personally reviewed the following data over the past 24 hrs:    15.2 (H)  \   10.7 (L)   / 329     136 100 41.7 (H) /  140 (H)   4.4 24 1.43 (H) \     Procal: N/A CRP: 34.78 (H) Lactic Acid: N/A       INR:  1.67 (H) PTT:  N/A   D-dimer:  N/A Fibrinogen:  N/A       Imaging results reviewed over the past 24 hrs:   No results found for this or any previous visit (from the past 24 hour(s)).

## 2024-06-30 NOTE — PLAN OF CARE
Goal Outcome Evaluation:       Pt. A&Ox4. VSS. Afebrile. Lung sounds CTA. Maintaining sats on RA. LBM 6/26. CMS and neuro's are intact. Denies numbness and tingling in all extremities. Denies nausea, shortness of breath, and chest pain. RLE pain managed w/ prn Tylenol, Oxycodone and Robaxin. Villalobos patent w/ adequate output. Tolerating regular diet. RLE dressing changed around 2200 per POC. Pt up with Ax1 GB and walker. PIV is patent and SL in R lower arm. Call light is within reach, pt able to make needs known and is resting comfortably between cares.

## 2024-07-01 ENCOUNTER — APPOINTMENT (OUTPATIENT)
Dept: PHYSICAL THERAPY | Facility: CLINIC | Age: 84
DRG: 623 | End: 2024-07-01
Attending: STUDENT IN AN ORGANIZED HEALTH CARE EDUCATION/TRAINING PROGRAM
Payer: COMMERCIAL

## 2024-07-01 ENCOUNTER — PATIENT OUTREACH (OUTPATIENT)
Dept: PLASTIC SURGERY | Facility: CLINIC | Age: 84
End: 2024-07-01
Payer: COMMERCIAL

## 2024-07-01 LAB
ANION GAP SERPL CALCULATED.3IONS-SCNC: 14 MMOL/L (ref 7–15)
BACTERIA SNV CULT: ABNORMAL
BASOPHILS # BLD AUTO: 0.1 10E3/UL (ref 0–0.2)
BASOPHILS NFR BLD AUTO: 1 %
BUN SERPL-MCNC: 43.4 MG/DL (ref 8–23)
CALCIUM SERPL-MCNC: 10 MG/DL (ref 8.8–10.2)
CHLORIDE SERPL-SCNC: 101 MMOL/L (ref 98–107)
CREAT SERPL-MCNC: 1.35 MG/DL (ref 0.51–0.95)
DEPRECATED HCO3 PLAS-SCNC: 22 MMOL/L (ref 22–29)
EGFRCR SERPLBLD CKD-EPI 2021: 39 ML/MIN/1.73M2
EOSINOPHIL # BLD AUTO: 0.2 10E3/UL (ref 0–0.7)
EOSINOPHIL NFR BLD AUTO: 2 %
ERYTHROCYTE [DISTWIDTH] IN BLOOD BY AUTOMATED COUNT: 12.6 % (ref 10–15)
GLUCOSE BLDC GLUCOMTR-MCNC: 107 MG/DL (ref 70–99)
GLUCOSE BLDC GLUCOMTR-MCNC: 127 MG/DL (ref 70–99)
GLUCOSE BLDC GLUCOMTR-MCNC: 128 MG/DL (ref 70–99)
GLUCOSE BLDC GLUCOMTR-MCNC: 135 MG/DL (ref 70–99)
GLUCOSE BLDC GLUCOMTR-MCNC: 211 MG/DL (ref 70–99)
GLUCOSE SERPL-MCNC: 135 MG/DL (ref 70–99)
HCT VFR BLD AUTO: 36.1 % (ref 35–47)
HGB BLD-MCNC: 12 G/DL (ref 11.7–15.7)
IMM GRANULOCYTES # BLD: 0.2 10E3/UL
IMM GRANULOCYTES NFR BLD: 2 %
INR PPP: 1.86 (ref 0.85–1.15)
LYMPHOCYTES # BLD AUTO: 1.9 10E3/UL (ref 0.8–5.3)
LYMPHOCYTES NFR BLD AUTO: 16 %
MCH RBC QN AUTO: 30.8 PG (ref 26.5–33)
MCHC RBC AUTO-ENTMCNC: 33.2 G/DL (ref 31.5–36.5)
MCV RBC AUTO: 93 FL (ref 78–100)
MONOCYTES # BLD AUTO: 1.2 10E3/UL (ref 0–1.3)
MONOCYTES NFR BLD AUTO: 10 %
NEUTROPHILS # BLD AUTO: 8.3 10E3/UL (ref 1.6–8.3)
NEUTROPHILS NFR BLD AUTO: 69 %
NRBC # BLD AUTO: 0 10E3/UL
NRBC BLD AUTO-RTO: 0 /100
PATH REPORT.COMMENTS IMP SPEC: NORMAL
PATH REPORT.FINAL DX SPEC: NORMAL
PATH REPORT.GROSS SPEC: NORMAL
PATH REPORT.MICROSCOPIC SPEC OTHER STN: NORMAL
PATH REPORT.RELEVANT HX SPEC: NORMAL
PHOTO IMAGE: NORMAL
PLATELET # BLD AUTO: 365 10E3/UL (ref 150–450)
POTASSIUM SERPL-SCNC: 4.2 MMOL/L (ref 3.4–5.3)
RBC # BLD AUTO: 3.89 10E6/UL (ref 3.8–5.2)
SODIUM SERPL-SCNC: 137 MMOL/L (ref 135–145)
WBC # BLD AUTO: 11.9 10E3/UL (ref 4–11)

## 2024-07-01 PROCEDURE — 85610 PROTHROMBIN TIME: CPT | Performed by: INTERNAL MEDICINE

## 2024-07-01 PROCEDURE — 82374 ASSAY BLOOD CARBON DIOXIDE: CPT | Performed by: INTERNAL MEDICINE

## 2024-07-01 PROCEDURE — 250N000013 HC RX MED GY IP 250 OP 250 PS 637: Performed by: INTERNAL MEDICINE

## 2024-07-01 PROCEDURE — 85025 COMPLETE CBC W/AUTO DIFF WBC: CPT | Performed by: INTERNAL MEDICINE

## 2024-07-01 PROCEDURE — 250N000013 HC RX MED GY IP 250 OP 250 PS 637: Performed by: FAMILY MEDICINE

## 2024-07-01 PROCEDURE — 250N000011 HC RX IP 250 OP 636: Performed by: FAMILY MEDICINE

## 2024-07-01 PROCEDURE — 120N000002 HC R&B MED SURG/OB UMMC

## 2024-07-01 PROCEDURE — 99232 SBSQ HOSP IP/OBS MODERATE 35: CPT | Performed by: INTERNAL MEDICINE

## 2024-07-01 PROCEDURE — 36415 COLL VENOUS BLD VENIPUNCTURE: CPT | Performed by: INTERNAL MEDICINE

## 2024-07-01 PROCEDURE — 99221 1ST HOSP IP/OBS SF/LOW 40: CPT | Mod: 4UV | Performed by: UROLOGY

## 2024-07-01 PROCEDURE — 97116 GAIT TRAINING THERAPY: CPT | Mod: GP

## 2024-07-01 PROCEDURE — 250N000013 HC RX MED GY IP 250 OP 250 PS 637: Performed by: STUDENT IN AN ORGANIZED HEALTH CARE EDUCATION/TRAINING PROGRAM

## 2024-07-01 PROCEDURE — 97530 THERAPEUTIC ACTIVITIES: CPT | Mod: GP

## 2024-07-01 RX ORDER — TAMSULOSIN HYDROCHLORIDE 0.4 MG/1
0.4 CAPSULE ORAL DAILY
Status: DISCONTINUED | OUTPATIENT
Start: 2024-07-01 | End: 2024-07-03 | Stop reason: HOSPADM

## 2024-07-01 RX ORDER — WARFARIN SODIUM 5 MG/1
5 TABLET ORAL
Status: COMPLETED | OUTPATIENT
Start: 2024-07-01 | End: 2024-07-01

## 2024-07-01 RX ADMIN — OXYCODONE HYDROCHLORIDE 5 MG: 5 TABLET ORAL at 20:46

## 2024-07-01 RX ADMIN — LEVOFLOXACIN 750 MG: 250 TABLET, FILM COATED ORAL at 09:14

## 2024-07-01 RX ADMIN — WARFARIN SODIUM 5 MG: 5 TABLET ORAL at 17:48

## 2024-07-01 RX ADMIN — DOXYCYCLINE HYCLATE 100 MG: 100 CAPSULE ORAL at 09:15

## 2024-07-01 RX ADMIN — AMLODIPINE BESYLATE 5 MG: 5 TABLET ORAL at 09:14

## 2024-07-01 RX ADMIN — MICONAZOLE NITRATE: 20 POWDER TOPICAL at 20:42

## 2024-07-01 RX ADMIN — TAMSULOSIN HYDROCHLORIDE 0.4 MG: 0.4 CAPSULE ORAL at 09:22

## 2024-07-01 RX ADMIN — PRAVASTATIN SODIUM 40 MG: 10 TABLET ORAL at 22:25

## 2024-07-01 RX ADMIN — DOXYCYCLINE HYCLATE 100 MG: 100 CAPSULE ORAL at 20:42

## 2024-07-01 RX ADMIN — MICONAZOLE NITRATE: 20 POWDER TOPICAL at 09:18

## 2024-07-01 RX ADMIN — INSULIN ASPART 2 UNITS: 100 INJECTION, SOLUTION INTRAVENOUS; SUBCUTANEOUS at 14:21

## 2024-07-01 RX ADMIN — OXYCODONE HYDROCHLORIDE 5 MG: 5 TABLET ORAL at 14:17

## 2024-07-01 RX ADMIN — ONDANSETRON 4 MG: 4 TABLET, ORALLY DISINTEGRATING ORAL at 06:31

## 2024-07-01 RX ADMIN — INSULIN GLARGINE 10 UNITS: 100 INJECTION, SOLUTION SUBCUTANEOUS at 22:28

## 2024-07-01 RX ADMIN — METOPROLOL SUCCINATE 25 MG: 25 TABLET, FILM COATED, EXTENDED RELEASE ORAL at 09:15

## 2024-07-01 RX ADMIN — ONDANSETRON 4 MG: 4 TABLET, ORALLY DISINTEGRATING ORAL at 20:46

## 2024-07-01 ASSESSMENT — ACTIVITIES OF DAILY LIVING (ADL)
ADLS_ACUITY_SCORE: 44
ADLS_ACUITY_SCORE: 46
ADLS_ACUITY_SCORE: 44
ADLS_ACUITY_SCORE: 46
ADLS_ACUITY_SCORE: 46
ADLS_ACUITY_SCORE: 44
ADLS_ACUITY_SCORE: 46
ADLS_ACUITY_SCORE: 44

## 2024-07-01 NOTE — CONSULTS
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 Urology consulted for urinary retention.  Etiology is likely multifactorial, patient is having limited mobility because of recent leg infection.  She also endorses being very constipated.  She did get some bowel regimen and had a bowel meant yesterday.  She also recently had a candidal urinary tract infection.  Currently have bladder drained via Villalobos catheter         Recommendations:   - Avoid anticholinergic, antihistamine, and alpha-agonist medications as these will exacerbate urinary retention  - Minimize narcotic pain medication regimen as tolerated  -Urine culture with Candida recently, will recommend starting antifungal treatment, as she has symptoms of urinary tract infection with a positive culture  - Increased ambulation/mobility will also usually help with improvement in the degree of urinary retention    -I discussed that in the acute setting where she has a urinary tract infection, and is recovering from her constipation, leg infection, mobility issues, she can keep the Villalobos catheter in for 7 days  - If she discharges before that, she should call urology office (she has outpatient urology that she follows with, [facility]), to set up a trial of void  - If she fails trial void prior to discharge, again, I discussed that CIC is better for long-term management rather than indwelling Villalobos catheter due to the concern for pressure injury  and the urethral erosion.  - If CIC is necessary, then the patient will need nursing instruction on proper self-intermittent catheterization technique.  - Remind patient that CIC should be performed ONLY after an attempt at spontaneous voiding is made  - Frequency of SIC can be determined based on the average post-void residual:  If PVR < 150cc - no cathing needed  If PVR is 150-250cc - cath bid (qam & qhs)  If PVR is 251-350cc - cath tid  If PVR is 351-450cc - cath qid  If PVR is > 450cc - cath every 4 hours  - The patient will need to keep a journal of his cathing regimen after discharge (will need to include frequency of cathing and post-void residual amount obtained from CIC) and bring this to clinic for review   - If patient unwilling or unable to perform CIC, may leave schilling catheter in place  - Patient will need to f/u in urology clinic approximately 1 week after discharge, she prefers to follow with her outpatient urologist at [facility] urology, however we would be happy to see her if she is unable to schedule appointment with them.  -Urology to sign off          History of Present Illness:   CC: Urinary retention     History is obtained from the patient    Francy Sherman is 84 year old person, with the above past medical and urologic history. Urology consulted for urinary retention.  At baseline, Francy Sherman has no frequency, urgency, retention, dysuria, hematuria  Recently had skin cancer on her lower extremity, had surgery, and then subsequently a wound infection.  She recently failed a trial void, has a Schilling catheter placed due to concern for urinary tract infection, however after the Schilling was removed she was unable to void, now has schilling in place, draining clear yellow urine  Constipation poorly managed  Denies fevers chills flank pain  Denies hematuria,  reports that she has quit smoking. She has never used smokeless tobacco.           Past Medical History:   History reviewed. No pertinent past  medical history.          Past Surgical History:     Past Surgical History:   Procedure Laterality Date    CHOLECYSTECTOMY      INCISION AND DRAINAGE LOWER EXTREMITY, COMBINED Right 6/26/2024    Procedure: Incision and drainage right lower extremity;  Surgeon: Edgard Salter MD;  Location:  OR             Social History:     Social History     Socioeconomic History    Marital status:      Spouse name: Not on file    Number of children: Not on file    Years of education: Not on file    Highest education level: Not on file   Occupational History    Not on file   Tobacco Use    Smoking status: Former     Types: Cigarettes    Smokeless tobacco: Never   Substance and Sexual Activity    Alcohol use: Never    Drug use: Never    Sexual activity: Not on file   Other Topics Concern    Not on file   Social History Narrative    Not on file     Social Determinants of Health     Financial Resource Strain: Not on file   Food Insecurity: Not on file   Transportation Needs: Not on file   Physical Activity: Not on file   Stress: Not on file   Social Connections: Not on file   Interpersonal Safety: Not on file   Housing Stability: Not on file             Family History:   History reviewed. No pertinent family history.          Allergies:      Allergies   Allergen Reactions    Macrobid [Nitrofurantoin] Rash             Medications:     Current Facility-Administered Medications   Medication Dose Route Frequency Provider Last Rate Last Admin    acetaminophen (TYLENOL) tablet 650 mg  650 mg Oral Q6H PRN Nelson Navarrete MD   650 mg at 06/30/24 2154    amLODIPine (NORVASC) tablet 5 mg  5 mg Oral Daily Nelson Navarrete MD   5 mg at 07/01/24 0914    benzocaine-menthol (CHLORASEPTIC) 6-10 MG lozenge 1 lozenge  1 lozenge Buccal Q1H PRN Joe Arshad MD   1 lozenge at 06/28/24 0807    bisacodyl (DULCOLAX) suppository 10 mg  10 mg Rectal Daily PRN Joe Arshad MD        glucose gel 15-30 g  15-30 g Oral Q15 Min PRN Landon  MD Nelson        Or    dextrose 50 % injection 25-50 mL  25-50 mL Intravenous Q15 Min PRNelson Hercules MD        Or    glucagon injection 1 mg  1 mg Subcutaneous Q15 Min PRNelson Hercules MD        doxycycline hyclate (VIBRAMYCIN) capsule 100 mg  100 mg Oral Q12H MIKO (08/20) Joe Arshad MD   100 mg at 07/01/24 0915    HYDROmorphone (DILAUDID) injection 0.2 mg  0.2 mg Intravenous Q2H PRN Nelson Navarrete MD   0.2 mg at 06/27/24 1209    insulin aspart (NovoLOG) injection (RAPID ACTING)  1-7 Units Subcutaneous 4x Daily AC & HS Nelson Navarrete MD   1 Units at 06/30/24 1555    insulin aspart (NovoLOG) injection (RAPID ACTING)   Subcutaneous TID w/meals Nelson Navarrete MD   1 Units at 06/30/24 1145    insulin glargine (LANTUS PEN) injection 10 Units  10 Units Subcutaneous At Bedtime Nelson Navarrete MD   10 Units at 06/30/24 2155    levofloxacin (LEVAQUIN) tablet 750 mg  750 mg Oral Every Other Day Joe Arshad MD   750 mg at 07/01/24 0914    lidocaine (LMX4) cream   Topical Q1H PRN Nelson Navarrete MD        lidocaine 1 % 0.1-1 mL  0.1-1 mL Other Q1H PRNelson Hercules MD        methocarbamol (ROBAXIN) tablet 500 mg  500 mg Oral TID PRN Nelson Navarrete MD   500 mg at 06/30/24 1549    metoprolol succinate ER (TOPROL XL) 24 hr tablet 25 mg  25 mg Oral Daily Nelson Navarrete MD   25 mg at 07/01/24 0915    miconazole (MICATIN) 2 % powder   Topical BID Hannah Villatoro MD   Given at 07/01/24 0918    naloxone (NARCAN) injection 0.2 mg  0.2 mg Intravenous Q2 Min PRNelson Hercules MD        Or    naloxone (NARCAN) injection 0.4 mg  0.4 mg Intravenous Q2 Min PRNelson Hercules MD        Or    naloxone (NARCAN) injection 0.2 mg  0.2 mg Intramuscular Q2 Min PRNelson Hercules MD        Or    naloxone (NARCAN) injection 0.4 mg  0.4 mg Intramuscular Q2 Min PRN Nelson Navarrete MD        ondansetron (ZOFRAN ODT) ODT tab 4 mg  4 mg Oral Q6H PRN Nelson Navarrete MD   4 mg at 07/01/24 0631    Or     ondansetron (ZOFRAN) injection 4 mg  4 mg Intravenous Q6H PRN Nelson Navarrete MD        oxyCODONE (ROXICODONE) tablet 5 mg  5 mg Oral Q4H PRN Nelson Navarrete MD   5 mg at 06/30/24 1829    Patient is already receiving anticoagulation with heparin, enoxaparin (LOVENOX), warfarin (COUMADIN)  or other anticoagulant medication   Does not apply Continuous PRN Nelson Navarrete MD        polyethylene glycol (MIRALAX) Packet 17 g  17 g Oral Daily Joe Arshad MD   17 g at 06/30/24 0956    pravastatin (PRAVACHOL) tablet 40 mg  40 mg Oral At Bedtime Nelson Navarrete MD   40 mg at 06/30/24 2154    senna-docusate (SENOKOT-S/PERICOLACE) 8.6-50 MG per tablet 1 tablet  1 tablet Oral BID PRN Nelson Navarrete MD        Or    senna-docusate (SENOKOT-S/PERICOLACE) 8.6-50 MG per tablet 2 tablet  2 tablet Oral BID PRNelson Juarez MD   2 tablet at 06/30/24 0830    sodium chloride (PF) 0.9% PF flush 3 mL  3 mL Intracatheter Q8H Nelson Navarrete MD   3 mL at 06/30/24 1556    sodium chloride (PF) 0.9% PF flush 3 mL  3 mL Intracatheter q1 min prn Nelson Navarrete MD        sodium chloride (PF) 0.9% PF flush 3 mL  3 mL Intracatheter q1 min prNelson Juarez MD        tamsulosin (FLOMAX) capsule 0.4 mg  0.4 mg Oral Daily Joe Arshad MD   0.4 mg at 07/01/24 0922    Warfarin Dose Required Daily - Pharmacist Managed  1 each Oral See Admin Instructions Joe Arshad MD                 Review of Systems:      All other review of systems negative, except for what is mentioned above        Physical Exam:   /61 (BP Location: Right arm)   Pulse 81   Temp 98  F (36.7  C) (Oral)   Resp 16   Wt 64.7 kg (142 lb 10.2 oz)   SpO2 95%   BMI 23.74 kg/m    GEN: AAO*3, not in acute distress, lying comfortably  HEENT: atraumatic, mucosa moist  CVS: normal heart rate, perfusing extremeties  RESP: no resp distress, satting well on RA  ABD: soft, nontender, nondistended.    : Villalobos catheter in place with clear yellow urine in the  drainage bag  EXT: Extremities atraumatic, grossly normal range of motion  NEURO/PSYCH: CN grossly normal, no focal weakness, normal mood and thought process              Data:     Recent Labs   Lab Test 07/01/24  0643 06/30/24  0718 06/29/24  0604   WBC 11.9* 15.2* 16.1*   HGB 12.0 10.7* 10.4*   CR 1.35* 1.43* 1.41*       All cultures:  Recent Labs   Lab 06/28/24  1448 06/26/24  0813   CULTURE 10,000-50,000 CFU/mL Candida albicans* No anaerobic organisms isolated after 5 days  No growth after 5 days  No anaerobic organisms isolated after 5 days  No growth after 5 days  1+ Staphylococcus aureus MRSA*  1+ Staphylococcus aureus*  See corresponding culture for results  See corresponding culture for results        Blood culture:  Results for orders placed or performed during the hospital encounter of 06/14/24   Blood Culture Peripheral Blood    Specimen: Peripheral Blood   Result Value Ref Range    Culture No Growth       Urine culture:  Results for orders placed or performed during the hospital encounter of 06/22/24   Urine Culture    Specimen: Urine, Villalobos Catheter   Result Value Ref Range    Culture 10,000-50,000 CFU/mL Candida albicans (A)           No results found for this or any previous visit from the past 365 days.           Discussed with staff surgeon Dr. Beltre, who agrees with the assessment and plans    Alejandro Farrell MD   PGY-2 Urology  [facility]

## 2024-07-01 NOTE — PROGRESS NOTES
Schilling catheter removed this morning, unable to void, straight cathed at 1800. Called at ~02:15, unable to void with bladder scan >900ml. Patient preferred replacement of schilling to intermittent catheterization. Schilling placed.     ARACELI Roy MD  Internal Medicine-Pediatrics

## 2024-07-01 NOTE — PROGRESS NOTES
Plastic Surgery Progress Note    Subjective: had issues with urinary retention, schilling replaced overnight. Does not have much of an appetite.     Temp:  [97.9  F (36.6  C)-98.5  F (36.9  C)] 98  F (36.7  C)  Pulse:  [74-81] 81  Resp:  [16] 16  BP: (133-147)/(52-61) 133/61  SpO2:  [92 %-95 %] 95 %      General: Alert, well-appearing in no acute distress.  Respiratory: Non-labored breathing  Extremities: RLE: 3 cm x 2 cm x 1cm wound to anterior leg. Wound with residual fibrinous exudate, no significant granulation tissue. No drainage, no odor, no cellulitis. Foot with mild edema.     Francy Sherman is a 84 year old F s/p 6/26 I&D RLE abscess with orthopedics after SCC excision and closure at outpatient dermatology, plastic surgery following for assistance with wound management    - wound stable, fibrinous tissue slightly improved, no significant granulation tissue yet.   - Recommend continued wound care; BID dressing changes to the anterior leg wound with vashe moistened gauze, ABD  and ace wrap. Please wrap from foot up to below knee to help with swelling. Dressing changed mid morning.    - continue with plan to heal by secondary intention. This can take months.   - Do not recommend wound vac at this time, can re-assess if wound appears healthier.   - Antibiotics per ID- doxy, levofloxacin  - WBAT RLE per ortho  - Due to patients functional status would likely benefit from TCU placement.  - Recommend high protein diet with supplements.   - Recommend WOC consult; WOC to upload pictures during dressing changes.  - If patient remains inpatient, plastic surgery will follow weekly, if patient discharges to TCU, plastic surgery will coordinate follow-up in 2 weeks. Patient would prefer to be closer to [place] for wound care.     Lesli Solis PA-C  Plastic and Reconstructive Surgery  Please page plastic surgery resident on call in [facility]    ADDENDUM:  Offered patient follow up appointment at [facility]. She is interested in  follow up in [facility] where her  is getting wound care. They will set up follow up and let us know if any issues.

## 2024-07-01 NOTE — PLAN OF CARE
Goal Outcome Evaluation:       Pt. A&Ox4. VSS. Afebrile. Lung sounds CTA. Maintaining sats on RA. LBM 6/30. CMS and neuro's are intact. Denies numbness and tingling in all extremities. Denies nausea, shortness of breath, and chest pain. RLE pain managed w/ prn Tylenol overnight. Schilling placed as pt was unable to void, adequate output. Tolerating regular diet.  at HS and 128 at 0200. RLE dressing changed around 2030 per POC. Pt up with Ax1 GB and walker. PIV is patent and SL in R lower arm. Call light is within reach, pt able to make needs known and is resting comfortably between cares.       Provider paged overnight as pt has been unable to void since schilling removal 6/30, SC at 1800, later BS for >900 pt requested schilling.     Plan TBD.

## 2024-07-01 NOTE — PROGRESS NOTES
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  She was initially admitted to [facility] 6/14/2024 with right leg cellulitis.  MRI on 6/15 showed superficial soft tissue edema, repeat MRI on 6/21 discovered new fluid collection and possible anterior tibialis tenosynovitis despite being on IV ceftriaxone and IV vancomycin.  Wound culture 6/19 grew MRSA.  Transferred to [facility] on 6/22/24 for orthopedic surgery consultation.  Per Dr Batres, \" Dr. Mays from orthopaedics discussed pt's case with [facility] team and patient accepted to [facility] for medicine admission with orthopaedic surgery consultation\"    MAJOR CHANGES AND THINKS TODAY  ---   Unfortunately pt required schilling re-insertion last night due to urinary retention  ---   Failed void trial  ---   Keep schilling in for a week and void trial after that  ---   Start flomax 0.4 mg daily  ---   Urology consult will see pt today  ---   Awaiting for plastic consult rec for wound care  ---   Awaiting for ID consult rec for duration of oral Levaquin and doxycycline  ---   Possible transfer to TCU today or tomorrow    Right shin cellulitis w/ abscess  ---   Right lower extremity skin cancer extraction ~ 6/4/2024, path revealed hypertrophic lichen planus with squamous cell carcinoma with unclear margins.   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  ---   Wound culture 6/19 " grew MRSA.  ---   ---92.9---104--->168 --->177.21 ---> 65.27 ---> 51.76 ---> 34.78  ---   WBC 17.0 ---> ---> 15.2 ---> ---> 11.7 ---> 12.3 ---> 17.9 ---> 16.1 ---> 15.2 ---> 11.9  ---   S/p I&D right shin abscess 6/26, intra-op cx grew MRSA  ---   S/p IV Vancomycin 6/13 - 6/25  ---   S/p IV Zosyn since 6/22 - 6/25  ---   S/p ceftriaxone added 6/18 - 6/22  ---   Continue Levaquin 750 mg po q48 hr, started on 6/25  ---   Continue Doxycycline 100 mg po bid, started on 6/25  ---   Wound care per plastic surgery  ---   ID consult service following  ---   Orthopedics consult service signed off    Urinary retention, 6/28  Urinary tract infection, 6/28  ---   Etiology unclear  ---   PVR was 775 ml on 6/28  ---   Pt c/o bladder pain and fullness on 6/28  ---   UA 6/28 with small blood, large leukocyte Estrace, negative nitrite, > 182 WBC, 94 RBC, WBC clumps present, many yeast present  ---   UCx grew 10-50 CFU/ml Candida Albicans, no indications for antifungal meds  ---   Inserted schilling cath on 6/28  ---   Remove schilling today 6/30, then void trial  ---   Failed void trial on 6/30  ---   PVR > 900 ML on 6/30  ---   Schilling cath reinserted on 6/30, keep for a week and then void trial after that  ---   Start flomax 0.4 mg daily  ---   Urology service consulted    Chronic atrial fibrillation  ---   PAF 55% in 9/2019  ---   in NSR during this eval  ---   Continue metoprolol for rate control  ---   [id number] score at least 5   ---   Continue Coumadin for stroke prophylaxis  ---   INR 1.86 today    HTN  ---   Continue home Norvasc and metoprolol with holding parameters .     Acute renal insuffiency  on CKD3  ---   Baseline Cr ~ 1.2 - 1.5  ---   Admit Cr was 1.62 ---> 1.26 ---> ---> 1.79 ---> ---> 1.30 ---> 1.43 ---> 1.35  today  ---   Avoid nephrotoxins     T2DM  ---   Hgba1 was 7.0% on 6/14/24  ---   Fasting glucose 135 today  ---   Glipizide on hold since admit  ---   Continue home Lantus 10 units at bedtime  ---   Continue  Novolog sliding scale insulin      HLD  ---   Continue home Pravachol.     Mild cognitive impairment  ---   Family notes that patient has been a bit more confused than her usual baseline since admission.  ---   May be related to oxycodone.  ---   Resolved    Anemia  ---   Likely due to chronic disease  ---   Hgb stable at 10 -11 g    Constipation  ---   Likely OIC  ---   Patient has not had a BM in 4 days   ---   Start senna-S2 tab po bid prn, MiraLAX 17 g daily, Dulcolax suppository daily prn and Fleet enema daily prn          Diet: Snacks/Supplements Adult: Glucerna; With Meals  Regular Diet Adult    DVT Prophylaxis: Warfarin  Villalobos Catheter: PRESENT, indication: Other (Comment) (BS >900, pt unable to void), Acute retention or obstruction  Lines: None     Cardiac Monitoring: None  Code Status: No CPR- Do NOT Intubate    Disposition Plan    Probable transfer to TCU tomorrow              Joe Arshad MD  Hospitalist Service, GOLD TEAM 18  [facility]  Securely message with [employer] (more info)  Text page via [facility] Paging/Directory   See signed in provider for up to date coverage information  ______________________________________________________________________    Interval History   No complaints  Uneventful night  Villalobos cath reinserted last night    Physical Exam   Vital Signs: Temp: 98  F (36.7  C) Temp src: Oral BP: 133/61 Pulse: 81   Resp: 16 SpO2: 95 % O2 Device: None (Room air)    Weight: 142 lbs 10.2 oz  General appearence: awake alert  in  no apparent distress  RESPIRATORY: lungs clear    CARDIOVASCULAR:S1 S2 regular rate and rhythm, no rubs gallops or murmurs appreciated  GASTROINTESTINAL:soft, non-distended , non-tender , + bowel sounds,   SKIN: warm and dry, no mottling noted   NEUROLOGIC; awake alert and oriented, no focal deficits found  EXTREMITIES: no clubbing, cyanosis or edema, able to wigged toes in right, no calf tenderness , calf soft , see images of  wound              Data     I have personally reviewed the following data over the past 24 hrs:    11.9 (H)  \   12.0   / 365     137 101 43.4 (H) /  135 (H)   4.2 22 1.35 (H) \     INR:  1.86 (H) PTT:  N/A   D-dimer:  N/A Fibrinogen:  N/A       Imaging results reviewed over the past 24 hrs:   No results found for this or any previous visit (from the past 24 hour(s)).

## 2024-07-01 NOTE — PATIENT INSTRUCTIONS
Called today to provide wound care clinic number in [place] for pt's leg wound. Pt's spouse, Dae answered. I offered to get an appt for pt in 2-3 weeks. Dae said that they planned to get pt an appt at the same place that he gets wound care in [place]. They both like the providers and nurses at that clinic. Narayan BOWMAN RN

## 2024-07-02 ENCOUNTER — APPOINTMENT (OUTPATIENT)
Dept: PHYSICAL THERAPY | Facility: CLINIC | Age: 84
DRG: 623 | End: 2024-07-02
Attending: STUDENT IN AN ORGANIZED HEALTH CARE EDUCATION/TRAINING PROGRAM
Payer: COMMERCIAL

## 2024-07-02 LAB
ANION GAP SERPL CALCULATED.3IONS-SCNC: 13 MMOL/L (ref 7–15)
BASOPHILS # BLD AUTO: 0.1 10E3/UL (ref 0–0.2)
BASOPHILS NFR BLD AUTO: 1 %
BUN SERPL-MCNC: 47.6 MG/DL (ref 8–23)
CALCIUM SERPL-MCNC: 9.1 MG/DL (ref 8.8–10.2)
CHLORIDE SERPL-SCNC: 102 MMOL/L (ref 98–107)
CREAT SERPL-MCNC: 1.57 MG/DL (ref 0.51–0.95)
CRP SERPL-MCNC: 17.38 MG/L
DEPRECATED HCO3 PLAS-SCNC: 22 MMOL/L (ref 22–29)
EGFRCR SERPLBLD CKD-EPI 2021: 32 ML/MIN/1.73M2
EOSINOPHIL # BLD AUTO: 0.2 10E3/UL (ref 0–0.7)
EOSINOPHIL NFR BLD AUTO: 2 %
ERYTHROCYTE [DISTWIDTH] IN BLOOD BY AUTOMATED COUNT: 12.8 % (ref 10–15)
GLUCOSE BLDC GLUCOMTR-MCNC: 125 MG/DL (ref 70–99)
GLUCOSE BLDC GLUCOMTR-MCNC: 134 MG/DL (ref 70–99)
GLUCOSE BLDC GLUCOMTR-MCNC: 145 MG/DL (ref 70–99)
GLUCOSE BLDC GLUCOMTR-MCNC: 154 MG/DL (ref 70–99)
GLUCOSE BLDC GLUCOMTR-MCNC: 176 MG/DL (ref 70–99)
GLUCOSE SERPL-MCNC: 137 MG/DL (ref 70–99)
HCT VFR BLD AUTO: 35.1 % (ref 35–47)
HGB BLD-MCNC: 11.3 G/DL (ref 11.7–15.7)
IMM GRANULOCYTES # BLD: 0.3 10E3/UL
IMM GRANULOCYTES NFR BLD: 2 %
INR PPP: 1.96 (ref 0.85–1.15)
LYMPHOCYTES # BLD AUTO: 1.9 10E3/UL (ref 0.8–5.3)
LYMPHOCYTES NFR BLD AUTO: 16 %
MCH RBC QN AUTO: 30 PG (ref 26.5–33)
MCHC RBC AUTO-ENTMCNC: 32.2 G/DL (ref 31.5–36.5)
MCV RBC AUTO: 93 FL (ref 78–100)
MONOCYTES # BLD AUTO: 1.4 10E3/UL (ref 0–1.3)
MONOCYTES NFR BLD AUTO: 12 %
NEUTROPHILS # BLD AUTO: 8 10E3/UL (ref 1.6–8.3)
NEUTROPHILS NFR BLD AUTO: 67 %
NRBC # BLD AUTO: 0 10E3/UL
NRBC BLD AUTO-RTO: 0 /100
PLATELET # BLD AUTO: 333 10E3/UL (ref 150–450)
POTASSIUM SERPL-SCNC: 4.5 MMOL/L (ref 3.4–5.3)
RBC # BLD AUTO: 3.77 10E6/UL (ref 3.8–5.2)
SODIUM SERPL-SCNC: 137 MMOL/L (ref 135–145)
WBC # BLD AUTO: 11.8 10E3/UL (ref 4–11)

## 2024-07-02 PROCEDURE — 85610 PROTHROMBIN TIME: CPT | Performed by: INTERNAL MEDICINE

## 2024-07-02 PROCEDURE — 97530 THERAPEUTIC ACTIVITIES: CPT | Mod: GP

## 2024-07-02 PROCEDURE — 36415 COLL VENOUS BLD VENIPUNCTURE: CPT | Performed by: INTERNAL MEDICINE

## 2024-07-02 PROCEDURE — 80048 BASIC METABOLIC PNL TOTAL CA: CPT | Performed by: INTERNAL MEDICINE

## 2024-07-02 PROCEDURE — 250N000013 HC RX MED GY IP 250 OP 250 PS 637: Performed by: FAMILY MEDICINE

## 2024-07-02 PROCEDURE — 258N000003 HC RX IP 258 OP 636: Performed by: INTERNAL MEDICINE

## 2024-07-02 PROCEDURE — 99233 SBSQ HOSP IP/OBS HIGH 50: CPT | Performed by: INTERNAL MEDICINE

## 2024-07-02 PROCEDURE — 86140 C-REACTIVE PROTEIN: CPT | Performed by: INTERNAL MEDICINE

## 2024-07-02 PROCEDURE — 85025 COMPLETE CBC W/AUTO DIFF WBC: CPT | Performed by: INTERNAL MEDICINE

## 2024-07-02 PROCEDURE — 250N000013 HC RX MED GY IP 250 OP 250 PS 637: Performed by: STUDENT IN AN ORGANIZED HEALTH CARE EDUCATION/TRAINING PROGRAM

## 2024-07-02 PROCEDURE — 120N000002 HC R&B MED SURG/OB UMMC

## 2024-07-02 PROCEDURE — 250N000013 HC RX MED GY IP 250 OP 250 PS 637: Performed by: INTERNAL MEDICINE

## 2024-07-02 RX ORDER — TAMSULOSIN HYDROCHLORIDE 0.4 MG/1
0.4 CAPSULE ORAL DAILY
Qty: 30 CAPSULE | Refills: 0 | Status: SHIPPED | OUTPATIENT
Start: 2024-07-03

## 2024-07-02 RX ORDER — DOXYCYCLINE 100 MG/1
100 CAPSULE ORAL EVERY 12 HOURS
Qty: 34 CAPSULE | Refills: 0 | Status: SHIPPED | OUTPATIENT
Start: 2024-07-02 | End: 2024-07-19

## 2024-07-02 RX ORDER — LEVOFLOXACIN 750 MG/1
750 TABLET, FILM COATED ORAL EVERY OTHER DAY
Qty: 8 TABLET | Refills: 0 | Status: SHIPPED | OUTPATIENT
Start: 2024-07-03 | End: 2024-07-19

## 2024-07-02 RX ORDER — WARFARIN SODIUM 6 MG/1
6 TABLET ORAL
Status: COMPLETED | OUTPATIENT
Start: 2024-07-02 | End: 2024-07-02

## 2024-07-02 RX ADMIN — METOPROLOL SUCCINATE 25 MG: 25 TABLET, FILM COATED, EXTENDED RELEASE ORAL at 09:11

## 2024-07-02 RX ADMIN — AMLODIPINE BESYLATE 5 MG: 5 TABLET ORAL at 09:14

## 2024-07-02 RX ADMIN — MICONAZOLE NITRATE: 20 POWDER TOPICAL at 09:15

## 2024-07-02 RX ADMIN — TAMSULOSIN HYDROCHLORIDE 0.4 MG: 0.4 CAPSULE ORAL at 09:11

## 2024-07-02 RX ADMIN — SODIUM CHLORIDE 500 ML: 9 INJECTION, SOLUTION INTRAVENOUS at 17:34

## 2024-07-02 RX ADMIN — DOXYCYCLINE HYCLATE 100 MG: 100 CAPSULE ORAL at 09:11

## 2024-07-02 RX ADMIN — INSULIN ASPART 1 UNITS: 100 INJECTION, SOLUTION INTRAVENOUS; SUBCUTANEOUS at 22:34

## 2024-07-02 RX ADMIN — MICONAZOLE NITRATE: 20 POWDER TOPICAL at 20:38

## 2024-07-02 RX ADMIN — ACETAMINOPHEN 650 MG: 325 TABLET, FILM COATED ORAL at 09:11

## 2024-07-02 RX ADMIN — DOXYCYCLINE HYCLATE 100 MG: 100 CAPSULE ORAL at 20:38

## 2024-07-02 RX ADMIN — INSULIN GLARGINE 10 UNITS: 100 INJECTION, SOLUTION SUBCUTANEOUS at 22:34

## 2024-07-02 RX ADMIN — POLYETHYLENE GLYCOL 3350 17 G: 17 POWDER, FOR SOLUTION ORAL at 09:10

## 2024-07-02 RX ADMIN — PRAVASTATIN SODIUM 40 MG: 10 TABLET ORAL at 22:43

## 2024-07-02 RX ADMIN — SENNOSIDES AND DOCUSATE SODIUM 1 TABLET: 50; 8.6 TABLET ORAL at 09:11

## 2024-07-02 RX ADMIN — INSULIN ASPART 1 UNITS: 100 INJECTION, SOLUTION INTRAVENOUS; SUBCUTANEOUS at 17:41

## 2024-07-02 RX ADMIN — WARFARIN SODIUM 6 MG: 6 TABLET ORAL at 17:34

## 2024-07-02 RX ADMIN — ACETAMINOPHEN 650 MG: 325 TABLET, FILM COATED ORAL at 21:14

## 2024-07-02 RX ADMIN — OXYCODONE HYDROCHLORIDE 5 MG: 5 TABLET ORAL at 15:19

## 2024-07-02 ASSESSMENT — ACTIVITIES OF DAILY LIVING (ADL)
ADLS_ACUITY_SCORE: 46

## 2024-07-02 NOTE — PROGRESS NOTES
7/2/24  Care Management Follow Up    Update: CHW assisted SW with arranging a discharge wheelchair ride for tomorrow, 7/3, through [facility] ([phone]). Window service time is from 1136 to 1221. CHW completed PAS, [id number]. This was notified to the SW, pt, Charge RN, bedside RN, pt, and TCU.    ACCEPTING:    [facility]  [address] [place] [address]  PH: [phone]  7/2: Initial referral sent     Pending:    [facility]  [address] [place] [address]  PH: [phone]  7/2: Initial referral sent     [facility]  [address] [place] [address]  PH: [phone]  7/2: Initial referral sent     [facility]  PH: [phone]  FAX: [phone]  7/2: Initial referral sent     Declined:    [facility]  [address] [place] [address]  PH: [phone]  7/2: Initial referral sent. Declined due to no bed available      [facility]  [address] [place], [place]   PH: [phone]  7/2: Initial referral sent. Declined due to acuity too high and unable to staff     Doris Mckeon  Inpatient CHW  [facility] 5 Ortho, 8 Med Surge, & ED  PH: [phone]

## 2024-07-02 NOTE — PLAN OF CARE
Goal Outcome Evaluation: Pain/Mobility    Plan of Care Reviewed With: patient  Overall Patient Progress: improving  VS:     A/O X 4.   Temp: 97.9  F (36.6  C) Temp src: Oral BP: (!) 149/37 Pulse: 76   Resp: 16 SpO2: 94 % O2 Device: None (Room air)    Output:      Villalobos Catheter patent and draining adequate UO. Placed for urinary retention.  Last BM on 6/30/24.    Activity: RLE WBAT A1 in tranfers with GB/Walker   Skin: Wound on RLE with daily dressing BID.  Healing redness on abdominal folds from moisture.   Pain:     Intermittent pain on the RLE that worsens during wound dressing changes and was given with PRN Oxycodone 1x this shift.  RLE elevated on pillow.   CMS:     CMS and Neuro's are intact. Denies numbness and tingling in all extremities.   Dressing: Vashe soaked-gauze, kerlix and ace bandage, c/d/I.   Diet: Regular diet and tolerated   LDA: R PIV patent and saline locked.    Equipment: NA   Plan:     Pain Mgt, Daily wound dressing BID, BG monitoring on ISS with Novolog and Lantus   Additional Info: Pending TCU Placement

## 2024-07-02 NOTE — DISCHARGE INSTRUCTIONS
Plastic surgery recommendations:  - wound stable, fibrinous tissue slightly improved, no significant granulation tissue yet.   - Recommend continued wound care; BID dressing changes to the anterior leg wound with vashe moistened gauze, ABD  and ace wrap. Please wrap from foot up to below knee to help with swelling. Dressing changed mid morning.    - continue with plan to heal by secondary intention. This can take months.   - Do not recommend wound vac at this time, can re-assess if wound appears healthier.   - Antibiotics per ID- doxy, levofloxacin  - WBAT RLE per ortho  - Due to patients functional status would likely benefit from TCU placement.  - Recommend high protein diet with supplements.   - Recommend WOC consult; WOC to upload pictures during dressing changes.  - If patient remains inpatient, plastic surgery will follow weekly, if patient discharges to TCU, plastic surgery will coordinate follow-up in 2 weeks. Patient would prefer to be closer to [place] for wound care.       Urology recommendations:    - Avoid anticholinergic, antihistamine, and alpha-agonist medications as these will exacerbate urinary retention  - Minimize narcotic pain medication regimen as tolerated  -Urine culture with Candida recently, will recommend starting antifungal treatment, as she has symptoms of urinary tract infection with a positive culture  - Increased ambulation/mobility will also usually help with improvement in the degree of urinary retention     -I discussed that in the acute setting where she has a urinary tract infection, and is recovering from her constipation, leg infection, mobility issues, she can keep the Villalobos catheter in for 7 days  - If she discharges before that, she should call urology office (she has outpatient urology that she follows with, [facility] urology), to set up a trial of void  - If she fails trial void prior to discharge, again, I discussed that CIC is better for long-term management  rather than indwelling Schilling catheter due to the concern for pressure injury and the urethral erosion.  - If CIC is necessary, then the patient will need nursing instruction on proper self-intermittent catheterization technique.  - Remind patient that CIC should be performed ONLY after an attempt at spontaneous voiding is made  - Frequency of SIC can be determined based on the average post-void residual:  If PVR < 150cc - no cathing needed  If PVR is 150-250cc - cath bid (qam & qhs)  If PVR is 251-350cc - cath tid  If PVR is 351-450cc - cath qid  If PVR is > 450cc - cath every 4 hours  - The patient will need to keep a journal of his cathing regimen after discharge (will need to include frequency of cathing and post-void residual amount obtained from CIC) and bring this to clinic for review   - If patient unwilling or unable to perform CIC, may leave schilling catheter in place  - Patient will need to f/u in urology clinic approximately 1 week after discharge, she [facility], however we would be happy to see her if she is unable to schedule appointment with them.        [employer] LINE  [phone]    [employer] Line is a free resource for older adults and caregivers. Their Community Support Specialists can provided resources for aging in place, transitioning to a higher-level of care, selecting an appropriate Medicare plan - ALL things that apply to adults maneuvering the aging process to ensure that all their needs are met.    [employer] Line can assist with:     Understanding Medicare with fair and unbiased help, so you can choose the one that best fits your needs.    You can search for housing options by visiting www.[employer] and typing in housing in the search bar or by calling the [employer] Line and speaking to an agent that can assist you at [phone], Monday through Friday from 8:00 a.m. to 4:30 p.m.    Planning for  your future:        - Getting money and property organized by connecting with a .         - Selecting a Health Care Agent to make health care decisions for you, if you become unable to make or communicate decisions for yourself.  - Plan for long-term care services and supports, which are on-going help for someone with a chronic health condition or disability, and can be delivered in a person s home, the community or facility. Long-term care services and supports are expensive and many people are surprised to learn that Medicare covers very little. It is important that you have a plan for how you will pay for your long-term care. You can visit the [facility] s website to learn more about your options [place]/general-public/own-your-future.    *For more information, download a copy of the Senior [employer] Line s Planning Ahead booklet: https://[place].gov/senior-linkage-line/assets/MBA_3012_PlanningAhead_tcm1150-240107.pdf

## 2024-07-02 NOTE — PLAN OF CARE
Goal Outcome Evaluation:      Plan of Care Reviewed With: patient          Outcome Evaluation: Pt will discharge to [facility] TCU on Wednesday 07/03/24; [facility] WC pick-up between 11:36 a.m. and 12:21 p.m.

## 2024-07-02 NOTE — PROGRESS NOTES
"CLINICAL NUTRITION SERVICES - ASSESSMENT NOTE     Nutrition Prescription    RECOMMENDATIONS FOR MDs/PROVIDERS TO ORDER:  ***    Malnutrition Status:    ***    Recommendations already ordered by Registered Dietitian (RD):  ***    Future/Additional Recommendations:  ***  Monitor labs, intakes, and weight trends.     REASON FOR ASSESSMENT  Francy Sherman is a/an 84 year old female assessed by the dietitian for LOS    NUTRITION/MEDICAL HISTORY  History of ***    FINDINGS  ***    CURRENT NUTRITION ORDERS  Diet: {erdietoptions:813787}  Snacks/supplements: ***    Intake: ***-***% of documented meals. Poorly documented ***    Pt ordering (on average) *** kcal and *** g protein per day per [employer]. Supplements add *** kcal and *** g protein daily. *** With documented and reported*** intakes, pt is likely meeting ***% minimum energy and ***% protein needs.    LABS  Labs reviewed***    MEDICATIONS  Medications reviewed: Vibramycin, insulin (novolog, lantus), miralax last given 6/30, warfarin, zofran PRN, oxycodone PRN, senna PRN     ANTHROPOMETRICS  Height: 165.1 cm (5' 5\")  Most Recent Weight: 64.7 kg (142 lb 10.2 oz)   IBW: 56.8 kg (***% IBW)  BMI: {:253860}  Weight History: Pt with limited weight history prior to admission,*** *** lb (***%) weight *** over 1 month, *** *** lb (***%) weight *** over 6*** months***.  Pt reports UBW of ***.     Wt Readings from Last Encounters:   06/26/24 64.7 kg (142 lb 10.2 oz)   06/14/24 73.2 kg (161 lb 6.4 oz)   04/18/22 72.6 kg (160 lb)     Dosing Weight: *** kg - ***    ASSESSED NUTRITION NEEDS  Estimated Energy Needs: ***-*** kcals/day {ercalorieranges:867799}  Justification: {erjustifications:089944}  Estimated Protein Needs: ***-*** grams protein/day ({ :595066})  Justification: {erproteinjustification:420417}  Estimated Fluid Needs: 1 ml/kcal or per provider pending fluid status***  *** fluid restriction    PHYSICAL FINDINGS  See malnutrition section below.  Leg wound, see MD note " 7/1    MALNUTRITION  % Intake: {erintake:028522}  % Weight Loss: {%Weight loss:821473}  Subcutaneous Fat Loss: {ERfatlossmalnutrition:408945}  Muscle Loss: {ERmusclelossmalnutrition:799484}  Fluid Accumulation/Edema: Does not meet criteria  Malnutrition Diagnosis: ***    NUTRITION DIAGNOSIS  { :088218} related to *** as evidenced by ***      Predicted inadequate nutrient intake related to LOS as evidenced by potential for menu fatigue with prolonged hospitalization. ***    INTERVENTIONS  Implementation  { :878111}   { :412025}     Goals  Patient to consume % of nutritionally adequate meal trays TID, or the equivalent with supplements/snacks.      Monitoring/Evaluation  Progress toward goals will be monitored and evaluated per protocol.    Carmenza Mendieta MS, RDN, LD  TCU/OB/Ortho Clinical Dietitian  Available via phone and Vocera  Phone: [phone]  Vocera: Ortho Clinical Dietitian  Weekend/Holiday Vocera: Weekend Holiday Clinical Dietitian [Multi Site Groups]

## 2024-07-02 NOTE — PLAN OF CARE
VS: VSS except during PT pt was light headed and dizziness per PT pt had low BP when sitting up, pt denied CP or SOB, VSS when back to bed. MD aware 500 ml of NS bolus ordered for pt.    O2: Room air sat. > 90%.   Output: Villalobos in place with adequate amount.    Last BM: 07/02/24 passing gas.    Activity: Up to BSC with assist.    Skin: Intact except wound on R shin.    Pain: Comfortably manageable with PRN medication.    Neuro: CMS and neuro intact to baseline.    Dressing: CDI.    Diet: Regular tolerating okay.    LDA: PIV SL. Will discontinue before discharge.   Equipment: Walker, IV pole and personal belongings.   Plan: Discharge to rehab tomorrow morning.   Additional Info:

## 2024-07-02 NOTE — PROGRESS NOTES
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  She was initially admitted to [facility] 6/14/2024 with right leg cellulitis.  MRI on 6/15 showed superficial soft tissue edema, repeat MRI on 6/21 discovered new fluid collection and possible anterior tibialis tenosynovitis despite being on IV ceftriaxone and IV vancomycin.  Wound culture 6/19 grew MRSA.  Transferred to [facility] on 6/22/24 for orthopedic surgery consultation.  Per Dr aBtres, \" Dr. Mays from orthopaedics discussed pt's case with [facility] team and patient accepted to [facility] for medicine admission with orthopaedic surgery consultation\"    MAJOR CHANGES AND THINKS TODAY  - No acute events overnight.  Patient is medically ready for discharge when TCU bed is available.  - ID recommended to continue on current antibiotics until July 19th.  - Patient was evaluated by plastic surgery and urology yesterday recommendations as below.    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  ---   Wound culture 6/19 grew MRSA.  ---   ---92.9---104--->168 --->177.21 ---> 65.27 ---> 51.76 ---> 34.78  ---   WBC 17.0 ---> ---> 15.2 ---> ---> 11.7 ---> 12.3 ---> 17.9 ---> 16.1 ---> " 15.2 ---> 11.9  ---   S/p I&D right shin abscess 6/26, intra-op cx grew MRSA  ---   S/p IV Vancomycin 6/13 - 6/25  ---   S/p IV Zosyn since 6/22 - 6/25  ---   S/p ceftriaxone added 6/18 - 6/22  ---   Continue Levaquin 750 mg po q48 hr, started on 6/25  ---   Continue Doxycycline 100 mg po bid, started on 6/25  ---   Wound care per plastic surgery  ---   ID consult service following and recommended to continue on current antibiotic regimen until July 19th.   ---   Orthopedics consult service signed off    Plastic surgery recommendations  - wound stable, fibrinous tissue slightly improved, no significant granulation tissue yet.   - Recommend continued wound care; BID dressing changes to the anterior leg wound with vashe moistened gauze, ABD  and ace wrap. Please wrap from foot up to below knee to help with swelling. Dressing changed mid morning.    - continue with plan to heal by secondary intention. This can take months.   - Do not recommend wound vac at this time, can re-assess if wound appears healthier.   - Antibiotics per ID- doxy, levofloxacin  - WBAT RLE per ortho  - Due to patients functional status would likely benefit from TCU placement.  - Recommend high protein diet with supplements.   - Recommend WOC consult; WOC to upload pictures during dressing changes.  - If patient remains inpatient, plastic surgery will follow weekly, if patient discharges to TCU, plastic surgery will coordinate follow-up in 2 weeks. Patient would prefer to be closer to [place] for wound care.     Urinary retention, 6/28  Urinary tract infection, 6/28    Urology recommendations  -I discussed that in the acute setting where she has a urinary tract infection, and is recovering from her constipation, leg infection, mobility issues, she can keep the Villalobos catheter in for 7 days  - If she discharges before that, she should call urology office (she has outpatient urology that she follows with, [facility] urology), to set up a trial  of void  - If she fails trial void prior to discharge, again, I discussed that CIC is better for long-term management rather than indwelling Schilling catheter due to the concern for pressure injury and the urethral erosion.  - If CIC is necessary, then the patient will need nursing instruction on proper self-intermittent catheterization technique.  - Remind patient that CIC should be performed ONLY after an attempt at spontaneous voiding is made  - Frequency of SIC can be determined based on the average post-void residual:  If PVR < 150cc - no cathing needed  If PVR is 150-250cc - cath bid (qam & qhs)  If PVR is 251-350cc - cath tid  If PVR is 351-450cc - cath qid  If PVR is > 450cc - cath every 4 hours  - The patient will need to keep a journal of his cathing regimen after discharge (will need to include frequency of cathing and post-void residual amount obtained from CIC) and bring this to clinic for review   - If patient unwilling or unable to perform CIC, may leave schilling catheter in place  - Patient will need to f/u in urology clinic approximately 1 week after discharge, she prefers to follow with her outpatient urologist at [facility] urology, however we would be happy to see her if she is unable to schedule appointment with them.  -Urology to sign off       Chronic atrial fibrillation  ---   PAF 55% in 9/2019  ---   in NSR during this eval  ---   Continue metoprolol for rate control  ---   ILFKS1NLRJ score at least 5   ---   Continue Coumadin for stroke prophylaxis  ---   INR 1.86 today    HTN  ---   Continue home Norvasc and metoprolol with holding parameters .     Acute renal insuffiency  on CKD3  ---   Baseline Cr ~ 1.2 - 1.5  ---   Admit Cr was 1.62 ---> 1.26 ---> ---> 1.79 ---> ---> 1.30 ---> 1.43 ---> 1.35  today  ---   Avoid nephrotoxins     T2DM  ---   Hgba1 was 7.0% on 6/14/24  ---   Fasting glucose 135 today  ---   Glipizide on hold since admit  ---   Continue home Lantus 10 units at bedtime  ---    Continue Novolog sliding scale insulin      HLD  ---   Continue home Pravachol.     Mild cognitive impairment  ---   Family notes that patient has been a bit more confused than her usual baseline since admission.  ---   May be related to oxycodone.  ---   Resolved    Anemia  ---   Likely due to chronic disease  ---   Hgb stable at 10 -11 g    Constipation  ---   Likely OIC  ---   Patient has not had a BM in 4 days   ---   Start senna-S2 tab po bid prn, MiraLAX 17 g daily, Dulcolax suppository daily prn and Fleet enema daily prn          Diet: Snacks/Supplements Adult: Glucerna; With Meals  Regular Diet Adult  Diet    DVT Prophylaxis: Warfarin  Villalobos Catheter: PRESENT, indication: Acute retention or obstruction, Acute retention or obstruction  Lines: None     Cardiac Monitoring: None  Code Status: No CPR- Do NOT Intubate    Disposition Plan    Probable transfer to TCU tomorrow              Jeancarlos Mejia MD  Hospitalist Service, GOLD TEAM 17  [facility]  Securely message with [employer] (more info)  Text page via [employer] Paging/Directory   See signed in provider for up to date coverage information  ______________________________________________________________________    Interval History   No complaints  No acute events overnight.  Patient was alert, oriented and pleasant.  No specific complaints today.  Patient feels ready for discharge to TCU.    Physical Exam   Vital Signs: Temp: 98.4  F (36.9  C) Temp src: Oral BP: 131/56 Pulse: 83   Resp: 16 SpO2: 97 % O2 Device: None (Room air)    Weight: 142 lbs 10.2 oz  General appearence: awake alert  in  no apparent distress, room air.  RESPIRATORY: lungs clear    CARDIOVASCULAR:S1 S2 regular rate and rhythm, no rubs gallops or murmurs appreciated  GASTROINTESTINAL:soft, non-distended , non-tender , + bowel sounds,   SKIN: warm and dry, no mottling noted   NEUROLOGIC; awake alert and oriented, no focal deficits  found  EXTREMITIES: no clubbing, cyanosis or edema, able to wigged toes in right, no calf tenderness , calf soft , see images of wound              Data     I have personally reviewed the following data over the past 24 hrs:    11.8 (H)  \   11.3 (L)   / 333     137 102 47.6 (H) /  134 (H)   4.5 22 1.57 (H) \     Procal: N/A CRP: 17.38 (H) Lactic Acid: N/A       INR:  1.96 (H) PTT:  N/A   D-dimer:  N/A Fibrinogen:  N/A       Imaging results reviewed over the past 24 hrs:   No results found for this or any previous visit (from the past 24 hour(s)).

## 2024-07-02 NOTE — PROGRESS NOTES
Care Management Follow Up w/ DISCHARGE PLAN    Length of Stay (days): 9    Expected Discharge Date: 07/03/2024 at ~11:30 a.m.     Concerns to be Addressed: discharge planning     Patient plan of care discussed at interdisciplinary rounds: Yes    Anticipated Discharge Disposition: Transitional Care    [facility]  [address] [place] [address]  PH: [phone]  Fax: [phone]     Anticipated Discharge Services:  (Post acute therapies)  Anticipated Discharge DME: None    Patient/family educated on Medicare website which has current facility and service quality ratings: no  Education Provided on the Discharge Plan: Yes  Patient/Family in Agreement with the Plan: yes    Referrals Placed by CM/SW: Post Acute Facilities (see CHW note for referral status)  Private pay costs discussed: transportation costs and insurance costs co-pays    Additional Information:  Per IDT rounds, pt is medically stable to discharge today, will discharge with Villalobos catheter. Plastic Surgery confirmed that pt has elected to arrange her own OP wound care, prefers to follow-up in [place] where her  is getting wound care.      [facility]   [address] [place] [address]  Admissions: [phone]  Fax: [phone]    1117: SW left VM for [facility], requested a call back to discuss pt's status and return to TCU.    1150: Thi in Admissions called back SW, clarified that pt NEVER admitted to their TCU; pt was accepted by supposed to admit form [facility], but then pt transferred from [facility] to [facility]. Thi stated that they likely won't have any beds this week. CHW sent Initial SNF referral via [employer].    IBRAHIMA met with pt at bedside to discuss the above information. Pt reviewed the 'Care Compare' list that SW provided and elected 5 preferences (see CHW note for referral status). SW provided education on Medicare benefit and what to expect while at TCU. SW also provided Senior  Linkage Line and [facility] resources, per pt's request.    CHW sent TCU referrals, per pt's preferences. [facility] accepted pt for tomorrow, agreeable to an 11:00 a.m. discharge.    IBRAHIMA discussed discharge plan with pt at bedside, including OOP costs for [employer] WC ride, which pt was agreeable to. No further questions or concerns re: discharge plan.    CHW secured a WC ride with [employer] for tomorrow (07/03/24) between 11:36 a.m. to 12:21 p.m.    IBRAHIMA messaged Dr. Mejia, provided update of discharge plan and requested that orders be completed by 9:00 a.m. tomorrow.          BRAYAN CabreraW, LSW  5 Ortho   PHONE: [phone]    IBRAHIMA Coverage SEARCHABLE in [employer] search 5 Ortho IBRAHIMA  (or by name)  Or click on link below:  5 Ortho Gus

## 2024-07-02 NOTE — PLAN OF CARE
VS: VSS, pt denied CP or SOB.   O2: Room air sat. > 90%.   Output: Villalobos in place with adequate amount.    Last BM: 07/01/24 passing gas.    Activity: Up to BSC with assist.    Skin: Intact except wound on R shin.    Pain: Comfortably manageable with PRN medication.    Neuro: CMS and neuro intact to baseline.    Dressing: CDI.    Diet: Regular tolerating okay.    LDA: PIV SL.    Equipment: Walker, IV pole and personal belongings.   Plan: TBD.    Additional Info:

## 2024-07-03 VITALS
TEMPERATURE: 98.6 F | OXYGEN SATURATION: 95 % | RESPIRATION RATE: 16 BRPM | DIASTOLIC BLOOD PRESSURE: 56 MMHG | BODY MASS INDEX: 24.93 KG/M2 | HEART RATE: 75 BPM | SYSTOLIC BLOOD PRESSURE: 143 MMHG | WEIGHT: 149.8 LBS

## 2024-07-03 LAB
ANION GAP SERPL CALCULATED.3IONS-SCNC: 12 MMOL/L (ref 7–15)
BACTERIA TISS BX CULT: NORMAL
BASOPHILS # BLD AUTO: 0.1 10E3/UL (ref 0–0.2)
BASOPHILS NFR BLD AUTO: 1 %
BUN SERPL-MCNC: 46.7 MG/DL (ref 8–23)
CALCIUM SERPL-MCNC: 9.2 MG/DL (ref 8.8–10.2)
CHLORIDE SERPL-SCNC: 104 MMOL/L (ref 98–107)
CREAT SERPL-MCNC: 1.46 MG/DL (ref 0.51–0.95)
DEPRECATED HCO3 PLAS-SCNC: 21 MMOL/L (ref 22–29)
EGFRCR SERPLBLD CKD-EPI 2021: 35 ML/MIN/1.73M2
EOSINOPHIL # BLD AUTO: 0.2 10E3/UL (ref 0–0.7)
EOSINOPHIL NFR BLD AUTO: 2 %
ERYTHROCYTE [DISTWIDTH] IN BLOOD BY AUTOMATED COUNT: 13 % (ref 10–15)
GLUCOSE BLDC GLUCOMTR-MCNC: 131 MG/DL (ref 70–99)
GLUCOSE SERPL-MCNC: 113 MG/DL (ref 70–99)
HCT VFR BLD AUTO: 33.7 % (ref 35–47)
HGB BLD-MCNC: 11 G/DL (ref 11.7–15.7)
IMM GRANULOCYTES # BLD: 0.2 10E3/UL
IMM GRANULOCYTES NFR BLD: 2 %
LYMPHOCYTES # BLD AUTO: 1.9 10E3/UL (ref 0.8–5.3)
LYMPHOCYTES NFR BLD AUTO: 17 %
MCH RBC QN AUTO: 30.5 PG (ref 26.5–33)
MCHC RBC AUTO-ENTMCNC: 32.6 G/DL (ref 31.5–36.5)
MCV RBC AUTO: 93 FL (ref 78–100)
MONOCYTES # BLD AUTO: 1.4 10E3/UL (ref 0–1.3)
MONOCYTES NFR BLD AUTO: 13 %
NEUTROPHILS # BLD AUTO: 7.5 10E3/UL (ref 1.6–8.3)
NEUTROPHILS NFR BLD AUTO: 65 %
NRBC # BLD AUTO: 0 10E3/UL
NRBC BLD AUTO-RTO: 0 /100
PLATELET # BLD AUTO: 320 10E3/UL (ref 150–450)
POTASSIUM SERPL-SCNC: 4.3 MMOL/L (ref 3.4–5.3)
RBC # BLD AUTO: 3.61 10E6/UL (ref 3.8–5.2)
SODIUM SERPL-SCNC: 137 MMOL/L (ref 135–145)
WBC # BLD AUTO: 11.3 10E3/UL (ref 4–11)

## 2024-07-03 PROCEDURE — 82374 ASSAY BLOOD CARBON DIOXIDE: CPT | Performed by: INTERNAL MEDICINE

## 2024-07-03 PROCEDURE — 36415 COLL VENOUS BLD VENIPUNCTURE: CPT | Performed by: INTERNAL MEDICINE

## 2024-07-03 PROCEDURE — 250N000013 HC RX MED GY IP 250 OP 250 PS 637: Performed by: FAMILY MEDICINE

## 2024-07-03 PROCEDURE — 250N000011 HC RX IP 250 OP 636: Performed by: FAMILY MEDICINE

## 2024-07-03 PROCEDURE — 85025 COMPLETE CBC W/AUTO DIFF WBC: CPT | Performed by: INTERNAL MEDICINE

## 2024-07-03 PROCEDURE — 99239 HOSP IP/OBS DSCHRG MGMT >30: CPT | Performed by: INTERNAL MEDICINE

## 2024-07-03 PROCEDURE — 250N000013 HC RX MED GY IP 250 OP 250 PS 637: Performed by: INTERNAL MEDICINE

## 2024-07-03 RX ADMIN — LEVOFLOXACIN 750 MG: 250 TABLET, FILM COATED ORAL at 07:56

## 2024-07-03 RX ADMIN — METOPROLOL SUCCINATE 25 MG: 25 TABLET, FILM COATED, EXTENDED RELEASE ORAL at 07:57

## 2024-07-03 RX ADMIN — TAMSULOSIN HYDROCHLORIDE 0.4 MG: 0.4 CAPSULE ORAL at 07:56

## 2024-07-03 RX ADMIN — MICONAZOLE NITRATE: 20 POWDER TOPICAL at 08:02

## 2024-07-03 RX ADMIN — DOXYCYCLINE HYCLATE 100 MG: 100 CAPSULE ORAL at 07:56

## 2024-07-03 RX ADMIN — ONDANSETRON 4 MG: 4 TABLET, ORALLY DISINTEGRATING ORAL at 09:40

## 2024-07-03 RX ADMIN — AMLODIPINE BESYLATE 5 MG: 5 TABLET ORAL at 07:56

## 2024-07-03 ASSESSMENT — ACTIVITIES OF DAILY LIVING (ADL)
ADLS_ACUITY_SCORE: 46

## 2024-07-03 NOTE — PROGRESS NOTES
Care Management Discharge Note    Discharge Date: 07/03/2024       Discharge Disposition: Transitional Care    [facility]  [address] [place] [address]  PH: [phone]  Fax: [phone]    Discharge Services:  (Post acute therapies)    Discharge DME: None    Discharge Transportation: [employer] WC pick-up between 11:36 a.m. and 12:21 p.m.     Private pay costs discussed: transportation costs and insurance costs co-pays    Does the patient's insurance plan have a 3 day qualifying hospital stay waiver?  Yes     Which insurance plan 3 day waiver is available? Alternative insurance waiver    Will the waiver be used for post-acute placement? Yes    PAS Confirmation Code: [id number]  Patient/family educated on Medicare website which has current facility and service quality ratings: no    Education Provided on the Discharge Plan: Yes  Persons Notified of Discharge Plans: Patient, bedside nurse, charge nurse, Dr. Mejia, and [facility] Admissions  Patient/Family in Agreement with the Plan: yes    Handoff Referral Completed: Yes    Additional Information:  CHW assisted with securing TCU placement, coordinating discharge, IMM, and PAS.    1005: Discharge orders faxed. (Pt does not have controlled substances; no hard script to fax.)    1045: SW left  for Admissions, requested a call back to confirm discharge orders were received; had to be vague, as the voice message was generic.     1114: SW sent discharge orders via [employer].  SW unable to get a hold of anyone at the TCU main line either (PH: [phone]).          EMI Cabrera, LSW  5 Ortho   PHONE: [phone]    SW Coverage SEARCHABLE in [employer] search 5 Ortho SW  (or by name)  Or click on link below:  5 Ortho Vocera

## 2024-07-03 NOTE — PLAN OF CARE
Physical Therapy Discharge Summary    Reason for therapy discharge:    Discharged to transitional care facility.    Progress towards therapy goal(s). See goals on Care Plan in [facility] electronic health record for goal details.  Goals partially met.  Barriers to achieving goals:   discharge from facility.    Therapy recommendation(s):    Continued therapy is recommended.  Rationale/Recommendations:  recommend continued skilled therapies at TCU for improvement of strength, activity tolerance, and independence with functional mobility.

## 2024-07-03 NOTE — DISCHARGE SUMMARY
[facility]  Hospitalist Discharge Summary      Date of Admission:  6/22/2024  Date of Discharge:  7/3/2024  Discharging Provider: Jeancarlos Mejia MD  Discharge Service: Hospitalist Service, GOLD TEAM 17    Discharge Diagnoses     Right shin cellulitis w/ abscess   Urinary retention   Chronic atrial fibrillation     Clinically Significant Risk Factors     # DMII: A1C = 7.0 % (Ref range: <5.7 %) within past 6 months       Follow-ups Needed After Discharge   Follow-up Appointments     Follow Up and recommended labs and tests      Follow up with Nursing home physician.  No follow up labs or test are   needed.            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 She was initially admitted to [facility] 6/14/2024 with right leg cellulitis. MRI on 6/15 showed superficial soft tissue edema, repeat MRI on 6/21 discovered new fluid collection and possible anterior tibialis tenosynovitis despite being on IV ceftriaxone and IV vancomycin.  Wound culture 6/19 grew MRSA. Transferred to [facility] on 6/22/24 for orthopedic surgery consultation.   Ortho & Plastic surgery evaluated the patient, no surgical intervention recommended. ID was consulted and managed antibiotic regimen. Patient remained hemodynamically stable and afebrile.   Patient developed urinary retention and schilling cath was placed. Urology evaluated the patient.   ID recommended to continue on  Doxycycline and Levofloxacin until 7/19.   Patient was discharged to [facility] facility.      Plastic surgery recommendations  - wound stable, fibrinous tissue slightly improved, no significant granulation tissue yet.   - Recommend continued wound care; BID dressing changes to the anterior leg wound with vashe moistened gauze, ABD  and ace wrap. Please wrap from foot up to below knee to help with swelling. Dressing changed mid morning.    - continue with plan to heal by secondary intention. This can take months.   - Do not recommend wound vac at this time, can re-assess if wound appears healthier.   - Antibiotics per ID- doxy, levofloxacin  - WBAT RLE per ortho  - Due to patients functional status would likely benefit from TCU placement.  - Recommend high protein diet with supplements.   - Recommend WOC consult; WOC to upload pictures during dressing changes.  - If patient remains inpatient, plastic surgery will follow weekly, if patient discharges to TCU, plastic surgery will coordinate follow-up in 2 weeks. Patient would prefer to be closer to [place] for wound care.     Urology recommendations  -I discussed that in the acute setting where she has a urinary tract infection, and is recovering from her constipation, leg infection, mobility issues, she can keep the Villalobos catheter in for 7 days  - If she discharges before that, she should call urology office (she has outpatient urology that she follows with, [facility] urology), to set up a trial of void  - If she fails trial void prior to discharge, again, I discussed that CIC is better for long-term management rather than indwelling Villalobos catheter due to the concern for pressure injury and the urethral erosion.  - If CIC is necessary, then the patient will need nursing instruction on proper self-intermittent catheterization technique.  - Remind patient that CIC should be performed ONLY after an attempt at spontaneous voiding is made  - Frequency of SIC can be  determined based on the average post-void residual:  If PVR < 150cc - no cathing needed  If PVR is 150-250cc - cath bid (qam & qhs)  If PVR is 251-350cc - cath tid  If PVR is 351-450cc - cath qid  If PVR is > 450cc - cath every 4 hours  - The patient will need to keep a journal of his cathing regimen after discharge (will need to include frequency of cathing and post-void residual amount obtained from CIC) and bring this to clinic for review   - If patient unwilling or unable to perform CIC, may leave schilling catheter in place  - Patient will need to f/u in urology clinic approximately 1 week after discharge, she prefers to follow with her outpatient urologist at [facility] urology, however we would be happy to see her if she is unable to schedule appointment with them.  -Urology to sign off          Consultations This Hospital Stay   PHARMACY TO DOSE VANCO  ORTHOPAEDIC SURGERY ADULT/PEDS IP CONSULT  INFECTIOUS DISEASE [facility] ADULT IP CONSULT  PHARMACY TO DOSE VANCO  PLASTIC SURGERY IP CONSULT  WOUND OSTOMY CONTINENCE NURSE  IP CONSULT  CARE MANAGEMENT / SOCIAL WORK IP CONSULT  PHARMACY TO DOSE WARFARIN  PHYSICAL THERAPY ADULT IP CONSULT  UROLOGY IP CONSULT    Code Status   No CPR- Do NOT Intubate    Time Spent on this Encounter   I, Jeancarlos Mejia MD, personally saw the patient today and spent greater than 30 minutes discharging this patient.       Jeancarlos Mejia MD  [facility] MED SURG ORTHOPEDIC  [facility] [address]  Phone: [phone]  Fax: [phone]  ______________________________________________________________________    Physical Exam   Vital Signs: Temp: 98.6  F (37  C) Temp src: Oral BP: (!) 143/56 Pulse: 75   Resp: 16 SpO2: 95 % O2 Device: None (Room air)    Weight: 149 lbs 12.8 oz  General appearence: awake alert  in  no apparent distress, room air.  RESPIRATORY: lungs clear    CARDIOVASCULAR:S1 S2 regular rate and rhythm, no rubs gallops or  "murmurs appreciated  GASTROINTESTINAL:soft, non-distended , non-tender , + bowel sounds,   SKIN: warm and dry, no mottling noted   NEUROLOGIC; awake alert and oriented, no focal deficits found         Primary Care Physician   DAMON REA    Discharge Orders      Brief Discharge Instructions    Right leg wound care: Twice daily dressing changes to the right anterior leg wound with vashe moistened gauze, ABD  and ace wrap. Please wrap from foot up to below knee to help with swelling.     Mantoux instructions    Give two-step Mantoux (PPD) Per Facility Policy Yes     Follow Up and recommended labs and tests    Follow up with Nursing home physician.  No follow up labs or test are needed.     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  She was initially admitted to [facility] 6/14/2024 with right leg cellulitis.  MRI on 6/15 showed superficial soft tissue edema, repeat MRI on 6/21 discovered new fluid collection and possible anterior tibialis tenosynovitis despite being on IV ceftriaxone and IV vancomycin.  Wound culture 6/19 grew MRSA.  Transferred to [facility] on 6/22/24 for orthopedic surgery consultation.  Per Dr Batres, \" Dr. Mays from orthopaedics discussed pt's case with [facility] team and patient accepted to [facility] for medicine admission with orthopaedic surgery consultation\"     Activity - Up ad emanuel     Fall precautions     Diet    Follow this diet upon discharge: Orders Placed This Encounter      Snacks/Supplements Adult: Glucerna; With Meals      Regular Diet Adult       Significant Results and Procedures   Results for orders placed or performed during the hospital encounter of 06/14/24   XR Tibia and Fibula Right 2 Views    Narrative    EXAM: XR TIBIA AND FIBULA RIGHT 2 VIEWS  LOCATION: [facility]  DATE: 06/14/2024    INDICATION: " Infection. Gas?  COMPARISON: None.      Impression    IMPRESSION: The right tibia and fibula are intact without evidence of a fracture. There is no cortical destructive change to suggest osteomyelitis. Soft tissue swelling. No soft tissue gas identified. Degenerative changes right knee. Chondrocalcinosis.    Atherosclerotic vascular calcifications.   XR Ankle Right G/E 3 Views    Narrative    EXAM: XR ANKLE RIGHT G/E 3 VIEWS, XR FOOT RIGHT 2 VIEWS  LOCATION: [facility]  DATE: 6/14/2024    INDICATION: Limited ROM at right ankle, cellulitis, ?effusion or evidence of infection.  COMPARISON: None.      Impression    IMPRESSION: Normal joint spaces and alignment. There is no evidence of an acute displaced right foot or ankle fracture. Ankle mortise is intact. Soft tissue swelling about the foot and ankle. Bones are demineralized.                   XR Foot Right 2 Views    Narrative    EXAM: XR ANKLE RIGHT G/E 3 VIEWS, XR FOOT RIGHT 2 VIEWS  LOCATION: [facility]  DATE: 6/14/2024    INDICATION: Limited ROM at right ankle, cellulitis, ?effusion or evidence of infection.  COMPARISON: None.      Impression    IMPRESSION: Normal joint spaces and alignment. There is no evidence of an acute displaced right foot or ankle fracture. Ankle mortise is intact. Soft tissue swelling about the foot and ankle. Bones are demineralized.                   MR Ankle Right w/o Contrast    Narrative    EXAM: MR ANKLE RIGHT W/O CONTRAST  LOCATION: [facility]  DATE: 6/15/2024    INDICATION: Swelling and limited ROM.  COMPARISON: Radiographs from 6/14/2024.  TECHNIQUE: Unenhanced.    FINDINGS:     TENDONS:   -Peroneal: Peroneus longus and brevis tendons are intact. No tendinopathy or tenosynovitis. No subluxation.  -Medial: Posterior tibialis tendon is intact. No tendinopathy or tenosynovitis. Flexor digitorum longus and flexor hallucis longus tendons are normal. No  tenosynovitis.  -Anterior: Anterior tibialis, extensor hallucis longus, and extensor digitorum longus tendons are normal. No tenosynovitis.  -Achilles: No tendinopathy or paratenonitis.    LIGAMENTS:   -Anterior talofibular ligament: High-grade tear of the anterior talofibular ligament, but no adjacent edema, indicating that this is chronic.   -Calcaneofibular ligament: Normal.   -Posterior talofibular ligament: Intact.  -Syndesmotic inferior tibiofibular ligaments: Intact.  -Deltoid ligament complex: Negative.  -Spring ligament complex: Negative.    JOINTS AND BONES:   -No fracture, bone contusion or osteochondral lesion. Normal articular cartilage. No joint effusion or synovitis.    SOFT TISSUES:  -Plantar fascia: Intact. No acute fasciitis or tear.  -Sinus tarsi and tarsal tunnel: Normal.  -Muscles: No muscle edema or atrophy. Superficial soft tissue edema diffusely. No hematoma, cyst or mass. No focal fluid collection. No evidence of abscess or soft tissue gas.      Impression    IMPRESSION:  1.  Superficial soft tissue edema diffusely, nonspecific, but this may be related to cellulitis. No evidence of abscess or soft tissue gas.  2.  High-grade tear of the anterior talofibular ligament, but no adjacent edema, indicating that this is chronic.  3.  No other abnormality.     MR Tibia Fibula Lower Leg Right wo Contrast    Narrative    EXAM: MR TIBIA FIBULA LOWER LEG RIGHT W/O CONTRAST  LOCATION: [facility]  DATE: 6/15/2024    INDICATION: Swelling and limited ROM.  COMPARISON: Radiographs from 6/14/2024.  TECHNIQUE: Unenhanced.    FINDINGS:     BONES:   -No fracture, bone contusion, or stress reaction. No concerning marrow replacing lesion. No evidence of osteomyelitis.    SOFT TISSUES:    -There is an open wound anteromedial to the mid shaft of the tibia. There is a small underlying fluid collection just superficial to the tibialis anterior muscle belly in this area. It is 5 mm in depth by  13 mm transverse by 22 mm cephalocaudal. There is   superficial soft tissue edema diffusely. There is also anterolateral compartment, lateral compartment and tibialis posterior muscle edema diffusely.      Impression    IMPRESSION:  1.  Open wound anteromedial to the mid shaft of the tibia. There is a small underlying fluid collection which may be an abscess.  2.  Diffuse superficial soft tissue edema.  3.  Diffuse muscle edema which may be due to myositis.  4.  No evidence of osteomyelitis.     XR Shoulder Right G/E 3 Views    Narrative    EXAM: XR SHOULDER RIGHT G/E 3 VIEWS  LOCATION: [facility]  DATE: 6/17/2024    INDICATION: right shoulder pain elicited with external rotation  COMPARISON: None.      Impression    IMPRESSION: No acute fracture or malalignment. Severe end-stage glenohumeral joint degenerative changes with bone-on-bone articulation, bony remodeling, and a large inferior humeral head osteophyte. There is a 1.2 cm intra-articular body superiorly.   Moderate acromioclavicular joint degenerative changes. Osteopenia. Chronic lung changes.   XR Chest Port 1 View    Narrative    EXAM: XR CHEST PORT 1 VIEW  LOCATION: [facility]  DATE: 6/18/2024    INDICATION: hypoxia  COMPARISON: 4/18/2022      Impression    IMPRESSION: Some stable slight fibrosis left lung base. No acute new findings. Degenerative change right shoulder.   MR Tibia Fibula Lower Leg Right wo & w Contr    Narrative    EXAM: MR TIBIA FIBULA LOWER LEG RIGHT W/O and W CONTR  LOCATION: [facility]  DATE: 6/21/2024    INDICATION: Recent biopsy, no improvement with antibiotics. Evaluate for abscess.  COMPARISON: 6/15/2024.  TECHNIQUE: Routine. Additional postgadolinium T1 sequences were obtained.  IV CONTRAST: Gadavist 7.5 mL.    FINDINGS:     BONES:   -No fracture, bone contusion, or stress reaction. No concerning marrow replacing lesion. No MR finding for acute tibia or  fibula osteomyelitis.    SOFT TISSUES:    -Redemonstrated is an open wound along the anterior mid tibia anterolateral to the corresponding tibial shaft as best seen on image 50 of series 10. Adjacent cellulitis. There is a small deep fluid collection compatible with an abscess as seen best on   images 48-51 measuring approximately 1.2 cm transverse x 1.0 cm anteroposterior x approximately 2.2 cm craniocaudal. The collection appears to communicate with the anterior tibialis and extensor digitorum longus tendon sheaths particularly the anterior   tibialis compatible with septic tenosynovitis. There is likely myositis of both the corresponding anterior tibialis and extensor digitorum longus musculature in the anterior compartment right leg. Sizable knee joint effusion on the right with synovitis.   Knee arthrosis on both sides. Intrinsic right calf muscle bulk is remarkable for some fatty infiltration fairly diffusely but no significant muscle atrophy. Generalized right leg soft tissue swelling.    Images of the contralateral left tibia and fibula demonstrate no fracture, stress fracture or bone lesion. No acute osteomyelitis. No acute left calf muscle strain or significant muscle edema. No significant left leg soft tissue swelling.      Impression    IMPRESSION:  1.  Open wound redemonstrated anterolateral to the midshaft right tibia with adjacent cellulitis.  2.  Defined subcutaneous fluid collection deep to the wound in the anterior mid right leg soft tissues along the superficial muscle belly of the anterior tibialis, approximately 1.2 x 1.0 x 2.2 cm.  3.  The collection appears to communicate with the anterior tibialis greater than extensor digitorum longus tendon sheaths, concerning for septic tenosynovitis.  4.  Likely anterior compartment myositis right leg near the wound.  5.  No evidence for right tibia or fibula acute osteomyelitis.       Discharge Medications   Current Discharge Medication List        START  taking these medications    Details   doxycycline hyclate (VIBRAMYCIN) 100 MG capsule Take 1 capsule (100 mg) by mouth every 12 hours for 17 days  Qty: 34 capsule, Refills: 0    Associated Diagnoses: Cellulitis and abscess of leg      levofloxacin (LEVAQUIN) 750 MG tablet Take 1 tablet (750 mg) by mouth every other day for 16 days  Qty: 8 tablet, Refills: 0    Associated Diagnoses: Cellulitis and abscess of leg      tamsulosin (FLOMAX) 0.4 MG capsule Take 1 capsule (0.4 mg) by mouth daily  Qty: 30 capsule, Refills: 0    Associated Diagnoses: Cellulitis and abscess of leg           CONTINUE these medications which have NOT CHANGED    Details   acetaminophen (TYLENOL) 325 MG tablet Take 650 mg by mouth every morning      amLODIPine (NORVASC) 5 MG tablet [AMLODIPINE (NORVASC) 5 MG TABLET] Take 5 mg by mouth daily.      glipiZIDE (GLUCOTROL) 5 MG tablet Take 5 mg by mouth 2 times daily (before meals)      LANTUS SOLOSTAR U-100 INSULIN 100 unit/mL (3 mL) pen [LANTUS SOLOSTAR U-100 INSULIN 100 UNIT/ML (3 ML) PEN] Inject 10 Units under the skin at bedtime. 11.65 Type 2 with hyperglycemia  Contact provider if insulin prescribed is not the preferred insulin per insurance.  Qty: 5 pen, Refills: PRN    Comments: BD Ultra-fine Alicia Pen Needles - NDC [address]-32 - dispense 1 case, refill PRN for 1 year  Microlet Color Lancets (100s) - dispense 1, refill PRN for 1 year  Associated Diagnoses: Type 2 diabetes mellitus with stage 3 chronic kidney disease, without long-term current use of insulin (H)      metoprolol succinate (TOPROL-XL) 25 MG [METOPROLOL SUCCINATE (TOPROL-XL) 25 MG] Take 1 tablet (25 mg total) by mouth daily.  Qty: 30 tablet, Refills: 0    Associated Diagnoses: Rapid atrial fibrillation (H)      pravastatin (PRAVACHOL) 40 MG tablet Take 40 mg by mouth at bedtime      warfarin ANTICOAGULANT (COUMADIN) 5 MG tablet Take 5-7.5 mg by mouth See Admin Instructions Take 7.5mg Mon/Wed/Fri and take 5mg rest of week.            STOP taking these medications       cephALEXin (KEFLEX) 500 MG capsule Comments:   Reason for Stopping:             Allergies   Allergies   Allergen Reactions    Macrobid [Nitrofurantoin] Rash

## 2024-07-03 NOTE — PLAN OF CARE
Goal Outcome Evaluation:      Plan of Care Reviewed With: patient    Overall Patient Progress: improvingOverall Patient Progress: improving       VS: /45 (BP Location: Right arm)   Pulse 85   Temp 98.4  F (36.9  C) (Oral)   Resp 16   Wt 67.9 kg (149 lb 12.8 oz)   SpO2 97%   BMI 24.93 kg/m      O2: > 92% on RA, denies SOB and chest pain   Output: Urethral catheter in for retention, draining well   Last BM: 7/2/2024   Activity: Ax1, not out of bed this shift   Skin: RLE wound, scattered bruising on both forearms   Pain: 6/10 managed with PRN tylenol   CMS: AO x 4, denies numbness and tingling    Dressing: RLE CDI, dressing changed 7/2/202224, next change is 7/3/2024 BID   Diet: Regular   LDA: R PIV SL    Equipment: IV pole, dressing change supplies, walker, bedside commode and personal belongings   Plan: Discharge to TCU between 11:36 am - 12:21 pm   Additional Info:

## 2024-07-03 NOTE — PLAN OF CARE
VS: BP (!) 143/56   Pulse 75   Temp 98.6  F (37  C) (Oral)   Resp 16   Wt 67.9 kg (149 lb 12.8 oz)   SpO2 95%   BMI 24.93 kg/m     O2: Stable on RA, denied SOB & CP   Output: Villalobos catheter in place w/adequate drainage   Last BM: 7/2/24, continent   Activity: A1 w/gait belt & walker   Skin: RLE cellulitis wound, bruising to LLE & scattered on forearms   Pain: Denied   CMS: A/O x4, denied N/T   Dressing: RLE dressing change this shift, CDI   Diet: Reg/thin/whole   LDA: Villalobos in place w/adequate drainage   Plan: Discharged this shift   Additional Info: Gave report to Galina at [facility]     DISCHARGE SUMMARY    Pt discharging to: [facility] TCU in [place]  Transportation: [facility] [employer]  Documentation was handed to . Pt has no further questions.   Medications given: No, not required when discharging to TCU   Belongings returned: Yes, ensured all belongings packed and sent with pt. No items in security.   Comments: Escorted safely to elevators. Pt left at 1230      Goal Outcome Evaluation:      Plan of Care Reviewed With: patient    Overall Patient Progress: improvingOverall Patient Progress: improving    Outcome Evaluation: Discharged this shift.

## 2024-07-05 ENCOUNTER — LAB REQUISITION (OUTPATIENT)
Dept: LAB | Facility: CLINIC | Age: 84
End: 2024-07-05
Payer: COMMERCIAL

## 2024-07-05 DIAGNOSIS — R79.1 ABNORMAL COAGULATION PROFILE: ICD-10-CM

## 2024-07-08 ENCOUNTER — LAB REQUISITION (OUTPATIENT)
Dept: LAB | Facility: CLINIC | Age: 84
End: 2024-07-08
Payer: COMMERCIAL

## 2024-07-08 DIAGNOSIS — E03.9 HYPOTHYROIDISM, UNSPECIFIED: ICD-10-CM

## 2024-07-10 LAB
BACTERIA SNV CULT: NORMAL
INR PPP: 3.08 (ref 0.85–1.15)

## 2024-07-10 PROCEDURE — 36415 COLL VENOUS BLD VENIPUNCTURE: CPT | Mod: ORL | Performed by: FAMILY MEDICINE

## 2024-07-10 PROCEDURE — 85610 PROTHROMBIN TIME: CPT | Mod: ORL | Performed by: FAMILY MEDICINE

## 2024-07-10 PROCEDURE — P9603 ONE-WAY ALLOW PRORATED MILES: HCPCS | Mod: ORL | Performed by: FAMILY MEDICINE

## 2024-07-22 NOTE — TELEPHONE ENCOUNTER
Made second and final attempt to offer scheduling. An unidentified male answered the phone and stated that the patient was not home. Writer left clinic info for the patient to call when she's available.

## 2024-07-24 LAB
BACTERIA SNV CULT: NO GROWTH
BACTERIA TISS BX CULT: NO GROWTH

## 2024-09-29 ENCOUNTER — HEALTH MAINTENANCE LETTER (OUTPATIENT)
Age: 84
End: 2024-09-29

## 2025-01-18 ENCOUNTER — HEALTH MAINTENANCE LETTER (OUTPATIENT)
Age: 85
End: 2025-01-18

## 2025-07-20 ENCOUNTER — HEALTH MAINTENANCE LETTER (OUTPATIENT)
Age: 85
End: 2025-07-20

## (undated) DEVICE — BNDG ELASTIC 4" DBL LENGTH UNSTERILE 6611-14

## (undated) DEVICE — SOL NACL 0.9% IRRIG 1000ML BOTTLE 2F7124

## (undated) DEVICE — BLADE KNIFE SURG 10 371110

## (undated) DEVICE — SOL WATER IRRIG 1000ML BOTTLE 2F7114

## (undated) DEVICE — PREP POVIDONE-IODINE 7.5% SCRUB 4OZ BOTTLE MDS093945

## (undated) DEVICE — TOURNIQUET CUFF 24" YELLOW LF 5921-024-135

## (undated) DEVICE — TUBING IRRIG CYSTO/BLADDER SET 81" LF 2C4040

## (undated) DEVICE — PACK LOWER EXTREMITY RIVERSIDE SOP32LEFSX

## (undated) DEVICE — LINEN ORTHO PACK 5446

## (undated) DEVICE — PREP POVIDONE-IODINE 10% SOLUTION 4OZ BOTTLE MDS093944

## (undated) DEVICE — GLOVE BIOGEL PI SZ 8.0 40880

## (undated) DEVICE — GLOVE BIOGEL PI MICRO INDICATOR UNDERGLOVE SZ 8.0 48980

## (undated) DEVICE — GOWN XLG DISP 9545

## (undated) DEVICE — ESU GROUND PAD ADULT W/CORD E7507

## (undated) DEVICE — CONTAINER SPECIMEN 4OZ STERILE 17099

## (undated) DEVICE — STRAP KNEE/BODY 31143004

## (undated) DEVICE — SOL NACL 0.9% IRRIG 3000ML BAG 2B7477

## (undated) DEVICE — SOLUTION WOUND VASHE BOTTLE 16 FL OZ. (475 ML)  00317

## (undated) DEVICE — KIT CULTURE TRANSPORT SYS A.C.T. II DUAL ANEROBE R124022

## (undated) DEVICE — SUCTION MANIFOLD NEPTUNE 2 SYS 4 PORT 0702-020-000

## (undated) DEVICE — TRAY PREP DRY SKIN SCRUB 067

## (undated) RX ORDER — ONDANSETRON 2 MG/ML
INJECTION INTRAMUSCULAR; INTRAVENOUS
Status: DISPENSED
Start: 2024-06-26

## (undated) RX ORDER — FENTANYL CITRATE-0.9 % NACL/PF 10 MCG/ML
PLASTIC BAG, INJECTION (ML) INTRAVENOUS
Status: DISPENSED
Start: 2024-06-26

## (undated) RX ORDER — HYDROMORPHONE HYDROCHLORIDE 1 MG/ML
INJECTION, SOLUTION INTRAMUSCULAR; INTRAVENOUS; SUBCUTANEOUS
Status: DISPENSED
Start: 2024-06-26

## (undated) RX ORDER — DEXAMETHASONE SODIUM PHOSPHATE 4 MG/ML
INJECTION, SOLUTION INTRA-ARTICULAR; INTRALESIONAL; INTRAMUSCULAR; INTRAVENOUS; SOFT TISSUE
Status: DISPENSED
Start: 2024-06-26

## (undated) RX ORDER — PROPOFOL 10 MG/ML
INJECTION, EMULSION INTRAVENOUS
Status: DISPENSED
Start: 2024-06-26

## (undated) RX ORDER — FENTANYL CITRATE 50 UG/ML
INJECTION, SOLUTION INTRAMUSCULAR; INTRAVENOUS
Status: DISPENSED
Start: 2024-06-26